# Patient Record
Sex: MALE | Race: WHITE | NOT HISPANIC OR LATINO | Employment: FULL TIME | URBAN - NONMETROPOLITAN AREA
[De-identification: names, ages, dates, MRNs, and addresses within clinical notes are randomized per-mention and may not be internally consistent; named-entity substitution may affect disease eponyms.]

---

## 2020-12-31 ENCOUNTER — IMMUNIZATION (OUTPATIENT)
Dept: VACCINE CLINIC | Facility: HOSPITAL | Age: 65
End: 2020-12-31

## 2020-12-31 PROCEDURE — 91300 HC SARSCOV02 VAC 30MCG/0.3ML IM: CPT | Performed by: FAMILY MEDICINE

## 2020-12-31 PROCEDURE — 0001A: CPT | Performed by: FAMILY MEDICINE

## 2021-01-21 ENCOUNTER — IMMUNIZATION (OUTPATIENT)
Dept: VACCINE CLINIC | Facility: HOSPITAL | Age: 66
End: 2021-01-21

## 2021-01-21 PROCEDURE — 91300 HC SARSCOV02 VAC 30MCG/0.3ML IM: CPT | Performed by: FAMILY MEDICINE

## 2021-01-21 PROCEDURE — 0002A: CPT | Performed by: FAMILY MEDICINE

## 2021-10-27 ENCOUNTER — IMMUNIZATION (OUTPATIENT)
Dept: VACCINE CLINIC | Facility: HOSPITAL | Age: 66
End: 2021-10-27

## 2021-10-27 PROCEDURE — 0004A ADM SARSCOV2 30MCG/0.3ML BOOSTER: CPT | Performed by: INTERNAL MEDICINE

## 2021-10-27 PROCEDURE — 91300 HC SARSCOV02 VAC 30MCG/0.3ML IM: CPT | Performed by: INTERNAL MEDICINE

## 2022-09-26 ENCOUNTER — TRANSCRIBE ORDERS (OUTPATIENT)
Dept: WOUND CARE | Facility: HOSPITAL | Age: 67
End: 2022-09-26

## 2022-09-26 ENCOUNTER — HOSPITAL ENCOUNTER (OUTPATIENT)
Dept: CARDIOLOGY | Facility: HOSPITAL | Age: 67
Discharge: HOME | End: 2022-09-26

## 2022-09-26 ENCOUNTER — TRANSCRIBE ORDERS (OUTPATIENT)
Dept: ADMINISTRATIVE | Facility: HOSPITAL | Age: 67
End: 2022-09-26

## 2022-09-26 ENCOUNTER — HOSPITAL ENCOUNTER (OUTPATIENT)
Dept: GENERAL RADIOLOGY | Facility: HOSPITAL | Age: 67
Discharge: HOME | End: 2022-09-26

## 2022-09-26 DIAGNOSIS — I83.029 VARICOSE VEINS OF LEFT LOWER EXTREMITY WITH ULCER OF UNSPECIFIED SITE (CODE): ICD-10-CM

## 2022-09-26 DIAGNOSIS — I83.029 VARICOSE VEINS OF LEFT LOWER EXTREMITY WITH ULCER OF UNSPECIFIED SITE (CODE): Primary | ICD-10-CM

## 2022-09-26 DIAGNOSIS — L97.929 VARICOSE VEINS WITH ULCER, LEFT: ICD-10-CM

## 2022-09-26 DIAGNOSIS — I87.2 VENOUS INSUFFICIENCY: Primary | ICD-10-CM

## 2022-09-26 DIAGNOSIS — I83.029 VARICOSE VEINS WITH ULCER, LEFT: ICD-10-CM

## 2022-09-26 DIAGNOSIS — I87.2 VENOUS INSUFFICIENCY: ICD-10-CM

## 2022-09-26 PROCEDURE — 93926 LOWER EXTREMITY STUDY: CPT | Performed by: RADIOLOGY

## 2022-09-26 PROCEDURE — 73610 X-RAY EXAM OF ANKLE: CPT

## 2022-09-26 PROCEDURE — 73630 X-RAY EXAM OF FOOT: CPT | Performed by: RADIOLOGY

## 2022-09-26 PROCEDURE — 73610 X-RAY EXAM OF ANKLE: CPT | Performed by: RADIOLOGY

## 2022-09-26 PROCEDURE — 93971 EXTREMITY STUDY: CPT | Performed by: RADIOLOGY

## 2022-09-26 PROCEDURE — 93926 LOWER EXTREMITY STUDY: CPT

## 2022-09-26 PROCEDURE — 73630 X-RAY EXAM OF FOOT: CPT

## 2022-09-26 PROCEDURE — 93971 EXTREMITY STUDY: CPT

## 2022-09-28 ENCOUNTER — HOSPITAL ENCOUNTER (OUTPATIENT)
Dept: WOUND CARE | Facility: HOSPITAL | Age: 67
Discharge: HOME | End: 2022-09-28
Admitting: NURSE PRACTITIONER

## 2022-09-28 VITALS
BODY MASS INDEX: 23.7 KG/M2 | WEIGHT: 175 LBS | HEIGHT: 72 IN | HEART RATE: 69 BPM | DIASTOLIC BLOOD PRESSURE: 71 MMHG | RESPIRATION RATE: 17 BRPM | TEMPERATURE: 98.3 F | SYSTOLIC BLOOD PRESSURE: 169 MMHG

## 2022-09-28 DIAGNOSIS — L97.322 NON-PRESSURE CHRONIC ULCER OF LEFT ANKLE WITH FAT LAYER EXPOSED: Primary | ICD-10-CM

## 2022-09-28 PROBLEM — L97.325: Status: ACTIVE | Noted: 2022-09-28

## 2022-09-28 PROCEDURE — 97602 WOUND(S) CARE NON-SELECTIVE: CPT

## 2022-09-28 PROCEDURE — 97605 NEG PRS WND THER DME<=50SQCM: CPT

## 2022-09-28 RX ORDER — OFLOXACIN 3 MG/ML
SOLUTION AURICULAR (OTIC)
COMMUNITY
Start: 2022-06-24 | End: 2022-10-07

## 2022-09-28 RX ORDER — CIPROFLOXACIN 500 MG/1
TABLET, FILM COATED ORAL EVERY 12 HOURS
COMMUNITY
Start: 2014-01-14 | End: 2022-10-07

## 2022-09-28 RX ORDER — DOXYCYCLINE HYCLATE 100 MG/1
CAPSULE ORAL
COMMUNITY
Start: 2022-09-26 | End: 2022-10-07

## 2022-09-28 RX ORDER — LIDOCAINE HYDROCHLORIDE 20 MG/ML
JELLY TOPICAL AS NEEDED
Status: DISCONTINUED | OUTPATIENT
Start: 2022-09-28 | End: 2022-09-29 | Stop reason: HOSPADM

## 2022-09-28 RX ORDER — AMLODIPINE BESYLATE 10 MG/1
TABLET ORAL
COMMUNITY
Start: 2022-09-26

## 2022-09-28 RX ORDER — DOXAZOSIN 2 MG/1
2 TABLET ORAL DAILY
COMMUNITY
Start: 2022-09-21

## 2022-09-28 RX ADMIN — LIDOCAINE HYDROCHLORIDE: 20 JELLY TOPICAL at 08:51

## 2022-09-28 NOTE — ADDENDUM NOTE
Encounter addended by: Jerald Sierra RN on: 9/28/2022 11:36 AM   Actions taken: Care Plan modified, Flowsheet accepted, Charge Capture section accepted

## 2022-09-28 NOTE — ADDENDUM NOTE
Encounter addended by: Jerald Sierra RN on: 9/28/2022 11:37 AM   Actions taken: Charge Capture section accepted

## 2022-09-28 NOTE — PROGRESS NOTES
Wound Clinic Note  Patient Identification:  Name:  Broderick Pereira  Age:  67 y.o.  Sex:  male  :  1955  MRN:  [id number]   Visit Number:  [id number]  Primary Care Physician:  Allyson Fox APRN     Subjective     Chief complaint:     Left ankle wound    History of presenting illness:     Patient is a 67 y.o. male with past medical history significant for  that presented today for evaluation of left medial ankle. Reports wound has been present for several weeks 3-4 approximately. Unsure of how the area occurred just noticed the area. Denies any trauma to the area that he can recall. He has been applying triple antibiotic to the area. He denies history of diabetes. Is non-smoker. He is currently on Doxycycline started 2022 prescribed by PCP. Denies any prior vascular disease. He has some imaging completed ordered by PCP: Arterial US 2022: Unremarkable exam with no occlusive segments or segments of high-grade. Venous US: No DVT in the left lower extremity on today's exam.  X-ray foot 2022: Soft tissue defect in the posterior subcutaneous tissues at the level of the distal tibia, but no erosion of bone. X-ray ankle 2022: No evidence of osteomyelitis on today's exam.  ---------------------------------------------------------------------------------------------------------------------   Review of Systems   Constitutional: Negative for chills and fever.   HENT: Negative for congestion and rhinorrhea.    Eyes: Negative for pain and redness.   Respiratory: Negative for cough and shortness of breath.    Cardiovascular: Positive for leg swelling. Negative for chest pain.        Intermittent    Gastrointestinal: Negative for nausea and vomiting.   Genitourinary: Negative for difficulty urinating and frequency.   Musculoskeletal: Negative for back pain and gait problem.   Skin: Positive for wound.   Neurological: Negative for dizziness and weakness.   Hematological: Does not bruise/bleed  easily.   Psychiatric/Behavioral: Negative for confusion. The patient is not nervous/anxious.       ---------------------------------------------------------------------------------------------------------------------   Past Medical History:   Diagnosis Date   • Hypertension      Past Surgical History:   Procedure Laterality Date   • GALLBLADDER SURGERY      15 or more years ago   • LEG SURGERY Left 2002    has dori in left leg     Family History   Problem Relation Age of Onset   • Hypertension Sister      Social History     Socioeconomic History   • Marital status:      ---------------------------------------------------------------------------------------------------------------------   Allergies:  Patient has no allergy information on record.  ---------------------------------------------------------------------------------------------------------------------  Objective     ---------------------------------------------------------------------------------------------------------------------   Vital Signs:     No data found.  There were no vitals filed for this visit.  There is no height or weight on file to calculate BMI.  Wt Readings from Last 3 Encounters:   03/25/14 86.2 kg (189 lb 15.9 oz)   01/14/14 81.7 kg (180 lb 0.1 oz)       ---------------------------------------------------------------------------------------------------------------------   Physical Exam  Constitutional: Vital sign were reviewed (temperature, pulse, respiration, and blood pressure) and found to be within expected limits per nursing, general appearance was assessed and the patient was found to be in no distress and calm and comfortable appears  Eyes:lids and lashes normal, pupils equal, round, reactive to light  Ears, Nose, Mouth, Throat:hearing intact and neck normal  Neck: shows normal range of motion without pain, and no evidence of trauma or deformity  and trachea is midline   Respiratory: Normal respiratory effort and  symmetrical chest expansion  Cardiovascular:pulses normal, and intact distal pulses dorsal-pedis  palpable and posteria-tib   palpable2+ lower bilateral  Gastrointestinal:no masses and no tenderness  Musculoskeletal: inspection of digits and nails shows normal findings  and no edema   Neurologic: Mental Status orientated to person, place, time and situation, Speech normal content and execusion  Psychiatric: Normal.  Skin: Temperature:normal turgor and temperatureColor: normal, no cyanosis, jaundice, pallor or bruising, Moisture: dry,Nails: thickened yellow toenails bed, Hair:thinning to lower extremities .  Physical Exam  Wound Assessment:  Location: Left, medial, ankle  Dressing Appearance: dressingappearance: clean  Closure: NA  Changes since last exam: this is initial exam  Etiology and classification: non-pressure chronic ulcer   Wound bed structures/characteristics: full-thickness (subcutaneous tissue is exposed in at least a portion of the wound), fascia is exposed   Edges moist  Periwound characteristics: moist  Periwound Temperature: normal turgor and temperature  Drainage characteristics: moderate, serous   Perfusion characteristics: Pulses palpable, capillary refill adequate    Wound Goal (s):Closure, Epithelization, Free of infection and No further symptoms  Assessment & Plan      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  Time out was called prior to procedure.   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 Fascia was removed by surgical excision  Instrument(s) used: Curette 4mm  Anesthesia:After checking patient allergies,lidocaine topical 2% was administered to provide anesthesia. and 2% Injectable lidocaine was used  Tissue removed: fascia, Percent Removed 80%  Culture or Biopsy: None  Estimated Blood Loss: Moderate  Hemostasis Obtained: pressure and Surgicel     Clinical Impression:Moderate Complexity    Follow-up: Friday for wound vac change. Will re-evaluate frequency of treatment either in clinic or at home with family assistance    MICKEY Brasher   [facility]  09/28/22  08:48 EDT

## 2022-09-29 ENCOUNTER — HOSPITAL ENCOUNTER (OUTPATIENT)
Dept: ULTRASOUND IMAGING | Facility: HOSPITAL | Age: 67
Discharge: HOME | End: 2022-09-29
Admitting: NURSE PRACTITIONER

## 2022-09-29 DIAGNOSIS — L97.322 NON-PRESSURE CHRONIC ULCER OF LEFT ANKLE WITH FAT LAYER EXPOSED: ICD-10-CM

## 2022-09-29 PROCEDURE — 93922 UPR/L XTREMITY ART 2 LEVELS: CPT | Performed by: RADIOLOGY

## 2022-09-29 PROCEDURE — 93922 UPR/L XTREMITY ART 2 LEVELS: CPT

## 2022-09-30 ENCOUNTER — HOSPITAL ENCOUNTER (OUTPATIENT)
Dept: WOUND CARE | Facility: HOSPITAL | Age: 67
Discharge: HOME | End: 2022-09-30

## 2022-09-30 ENCOUNTER — TRANSCRIBE ORDERS (OUTPATIENT)
Dept: ADMINISTRATIVE | Facility: HOSPITAL | Age: 67
End: 2022-09-30

## 2022-09-30 VITALS
HEART RATE: 92 BPM | DIASTOLIC BLOOD PRESSURE: 90 MMHG | SYSTOLIC BLOOD PRESSURE: 164 MMHG | TEMPERATURE: 98.7 F | RESPIRATION RATE: 18 BRPM

## 2022-09-30 DIAGNOSIS — R31.9 HEMATURIA, UNSPECIFIED TYPE: Primary | ICD-10-CM

## 2022-09-30 DIAGNOSIS — L97.322 NON-PRESSURE CHRONIC ULCER OF LEFT ANKLE WITH FAT LAYER EXPOSED: Primary | ICD-10-CM

## 2022-09-30 NOTE — PROGRESS NOTES
Patient here for wound vac dressing change. No new issues or concerns reported. Wife present, plans to change wound vac at home twice a week

## 2022-10-03 ENCOUNTER — APPOINTMENT (OUTPATIENT)
Dept: WOUND CARE | Facility: HOSPITAL | Age: 67
End: 2022-10-03

## 2022-10-04 ENCOUNTER — HOSPITAL ENCOUNTER (OUTPATIENT)
Dept: CT IMAGING | Facility: HOSPITAL | Age: 67
Discharge: HOME | End: 2022-10-04
Admitting: NURSE PRACTITIONER

## 2022-10-04 DIAGNOSIS — R31.9 HEMATURIA, UNSPECIFIED TYPE: ICD-10-CM

## 2022-10-04 LAB — CREAT BLDA-MCNC: 0.8 MG/DL (ref 0.6–1.3)

## 2022-10-04 PROCEDURE — 82565 ASSAY OF CREATININE: CPT

## 2022-10-04 PROCEDURE — 25010000002 IOPAMIDOL 61 % SOLUTION: Performed by: NURSE PRACTITIONER

## 2022-10-04 PROCEDURE — 74177 CT ABD & PELVIS W/CONTRAST: CPT | Performed by: RADIOLOGY

## 2022-10-04 PROCEDURE — 74177 CT ABD & PELVIS W/CONTRAST: CPT

## 2022-10-04 RX ADMIN — IOPAMIDOL 100 ML: 612 INJECTION, SOLUTION INTRAVENOUS at 14:24

## 2022-10-05 ENCOUNTER — HOSPITAL ENCOUNTER (OUTPATIENT)
Dept: WOUND CARE | Facility: HOSPITAL | Age: 67
Discharge: HOME | End: 2022-10-05
Admitting: NURSE PRACTITIONER

## 2022-10-05 VITALS
RESPIRATION RATE: 18 BRPM | TEMPERATURE: 98.3 F | HEART RATE: 94 BPM | DIASTOLIC BLOOD PRESSURE: 93 MMHG | SYSTOLIC BLOOD PRESSURE: 129 MMHG

## 2022-10-05 DIAGNOSIS — L97.322 NON-PRESSURE CHRONIC ULCER OF LEFT ANKLE WITH FAT LAYER EXPOSED: Primary | ICD-10-CM

## 2022-10-05 LAB
ALBUMIN SERPL-MCNC: 3.9 G/DL (ref 3.5–5.2)
ALBUMIN/GLOB SERPL: 1.6 G/DL
ALP SERPL-CCNC: 59 U/L (ref 39–117)
ALT SERPL W P-5'-P-CCNC: 14 U/L (ref 1–41)
ANION GAP SERPL CALCULATED.3IONS-SCNC: 12.2 MMOL/L (ref 5–15)
AST SERPL-CCNC: 17 U/L (ref 1–40)
BASOPHILS # BLD AUTO: 0.05 10*3/MM3 (ref 0–0.2)
BASOPHILS NFR BLD AUTO: 0.4 % (ref 0–1.5)
BILIRUB SERPL-MCNC: 0.6 MG/DL (ref 0–1.2)
BUN SERPL-MCNC: 26 MG/DL (ref 8–23)
BUN/CREAT SERPL: 28.9 (ref 7–25)
CALCIUM SPEC-SCNC: 9.4 MG/DL (ref 8.6–10.5)
CHLORIDE SERPL-SCNC: 106 MMOL/L (ref 98–107)
CO2 SERPL-SCNC: 22.8 MMOL/L (ref 22–29)
CREAT SERPL-MCNC: 0.9 MG/DL (ref 0.76–1.27)
CRP SERPL-MCNC: 2.74 MG/DL (ref 0–0.5)
DEPRECATED RDW RBC AUTO: 44 FL (ref 37–54)
EGFRCR SERPLBLD CKD-EPI 2021: 94.8 ML/MIN/1.73
EOSINOPHIL # BLD AUTO: 0.28 10*3/MM3 (ref 0–0.4)
EOSINOPHIL NFR BLD AUTO: 2.4 % (ref 0.3–6.2)
ERYTHROCYTE [DISTWIDTH] IN BLOOD BY AUTOMATED COUNT: 12.9 % (ref 12.3–15.4)
ERYTHROCYTE [SEDIMENTATION RATE] IN BLOOD: 18 MM/HR (ref 0–20)
GLOBULIN UR ELPH-MCNC: 2.5 GM/DL
GLUCOSE SERPL-MCNC: 91 MG/DL (ref 65–99)
HCT VFR BLD AUTO: 41.5 % (ref 37.5–51)
HGB BLD-MCNC: 13.5 G/DL (ref 13–17.7)
IMM GRANULOCYTES # BLD AUTO: 0.04 10*3/MM3 (ref 0–0.05)
IMM GRANULOCYTES NFR BLD AUTO: 0.3 % (ref 0–0.5)
LYMPHOCYTES # BLD AUTO: 1.31 10*3/MM3 (ref 0.7–3.1)
LYMPHOCYTES NFR BLD AUTO: 11.3 % (ref 19.6–45.3)
MCH RBC QN AUTO: 30.3 PG (ref 26.6–33)
MCHC RBC AUTO-ENTMCNC: 32.5 G/DL (ref 31.5–35.7)
MCV RBC AUTO: 93 FL (ref 79–97)
MONOCYTES # BLD AUTO: 0.9 10*3/MM3 (ref 0.1–0.9)
MONOCYTES NFR BLD AUTO: 7.8 % (ref 5–12)
NEUTROPHILS NFR BLD AUTO: 77.8 % (ref 42.7–76)
NEUTROPHILS NFR BLD AUTO: 9 10*3/MM3 (ref 1.7–7)
NRBC BLD AUTO-RTO: 0 /100 WBC (ref 0–0.2)
PLATELET # BLD AUTO: 199 10*3/MM3 (ref 140–450)
PMV BLD AUTO: 10.1 FL (ref 6–12)
POTASSIUM SERPL-SCNC: 3.7 MMOL/L (ref 3.5–5.2)
PREALB SERPL-MCNC: 22.2 MG/DL (ref 20–40)
PROT SERPL-MCNC: 6.4 G/DL (ref 6–8.5)
RBC # BLD AUTO: 4.46 10*6/MM3 (ref 4.14–5.8)
SODIUM SERPL-SCNC: 141 MMOL/L (ref 136–145)
WBC NRBC COR # BLD: 11.58 10*3/MM3 (ref 3.4–10.8)

## 2022-10-05 PROCEDURE — 87070 CULTURE OTHR SPECIMN AEROBIC: CPT | Performed by: NURSE PRACTITIONER

## 2022-10-05 PROCEDURE — 85652 RBC SED RATE AUTOMATED: CPT | Performed by: NURSE PRACTITIONER

## 2022-10-05 PROCEDURE — 85025 COMPLETE CBC W/AUTO DIFF WBC: CPT | Performed by: NURSE PRACTITIONER

## 2022-10-05 PROCEDURE — 80053 COMPREHEN METABOLIC PANEL: CPT | Performed by: NURSE PRACTITIONER

## 2022-10-05 PROCEDURE — 84134 ASSAY OF PREALBUMIN: CPT | Performed by: NURSE PRACTITIONER

## 2022-10-05 PROCEDURE — 86140 C-REACTIVE PROTEIN: CPT | Performed by: NURSE PRACTITIONER

## 2022-10-05 PROCEDURE — 87205 SMEAR GRAM STAIN: CPT | Performed by: NURSE PRACTITIONER

## 2022-10-05 PROCEDURE — 97602 WOUND(S) CARE NON-SELECTIVE: CPT

## 2022-10-05 RX ORDER — LIDOCAINE HYDROCHLORIDE 20 MG/ML
JELLY TOPICAL AS NEEDED
Status: DISCONTINUED | OUTPATIENT
Start: 2022-10-05 | End: 2022-10-06 | Stop reason: HOSPADM

## 2022-10-05 RX ADMIN — LIDOCAINE HYDROCHLORIDE: 20 JELLY TOPICAL at 09:16

## 2022-10-05 NOTE — PROGRESS NOTES
Wound Clinic Note  Patient Identification:  Name:  Broderick Pereira  Age:  67 y.o.  Sex:  male  :  1955  MRN:  [id number]   Visit Number:  [id number]  Primary Care Physician:  Allyson Fox APRN     Subjective     Chief complaint:     Left ankle wound    History of presenting illness:     Patient is a 67 y.o. male with past medical history significant for  that presented today for evaluation of left medial ankle. Reports wound has been present for several weeks 3-4 approximately. Unsure of how the area occurred just noticed the area. Denies any trauma to the area that he can recall. He has been applying triple antibiotic to the area. He denies history of diabetes. Is non-smoker. He is currently on Doxycycline started 2022 prescribed by PCP. Denies any prior vascular disease. He has some imaging completed ordered by PCP: Arterial US 2022: Unremarkable exam with no occlusive segments or segments of high-grade. Venous US: No DVT in the left lower extremity on today's exam.  X-ray foot 2022: Soft tissue defect in the posterior subcutaneous tissues at the level of the distal tibia, but no erosion of bone. X-ray ankle 2022: No evidence of osteomyelitis on today's exam.    Interval History:   10/05/2022: Patient seen in clinic today for follow-up to left medial ankle wound. He has had wound vac in place and wife and been performing changes at home. He has increase in swelling and erythema. He states he has been on his feet with work the past several days. Denies any fever or chills. Reports some increase in pain after being on his feet for several hours at work.   ---------------------------------------------------------------------------------------------------------------------   Review of Systems   Constitutional: Negative for chills and fever.   HENT: Negative for congestion and rhinorrhea.    Respiratory: Negative for cough and shortness of breath.    Cardiovascular: Positive for  leg swelling. Negative for chest pain.        Intermittent    Gastrointestinal: Negative for nausea and vomiting.   Musculoskeletal: Negative for back pain and gait problem.   Skin: Positive for wound.   Hematological: Does not bruise/bleed easily.      ---------------------------------------------------------------------------------------------------------------------   Past Medical History:   Diagnosis Date   • Hypertension      Past Surgical History:   Procedure Laterality Date   • GALLBLADDER SURGERY      15 or more years ago   • LEG SURGERY Left 2002    has dori in left leg     Family History   Problem Relation Age of Onset   • Hypertension Sister      Social History     Socioeconomic History   • Marital status:    Tobacco Use   • Smoking status: Never Smoker   • Smokeless tobacco: Never Used   Substance and Sexual Activity   • Alcohol use: Never   • Drug use: Never   • Sexual activity: Defer     Partners: Female     ---------------------------------------------------------------------------------------------------------------------   Allergies:  Sulfa antibiotics  ---------------------------------------------------------------------------------------------------------------------  Objective     ---------------------------------------------------------------------------------------------------------------------   Vital Signs:     No data found.  There were no vitals filed for this visit.  There is no height or weight on file to calculate BMI.  Wt Readings from Last 3 Encounters:   09/28/22 79.4 kg (175 lb)   03/25/14 86.2 kg (189 lb 15.9 oz)   01/14/14 81.7 kg (180 lb 0.1 oz)       ---------------------------------------------------------------------------------------------------------------------   Physical Exam  Constitutional: Vital sign were reviewed (temperature, pulse, respiration, and blood pressure) and found to be within expected limits per nursing, general appearance was assessed and the patient  was found to be in no distress and calm and comfortable appears  Eyes:lids and lashes normal, pupils equal, round, reactive to light  Ears, Nose, Mouth, Throat:hearing intact and neck normal  Neck: shows normal range of motion without pain, and no evidence of trauma or deformity  and trachea is midline   Respiratory: Normal respiratory effort and symmetrical chest expansion  Cardiovascular:pulses normal, and intact distal pulses dorsal-pedis  palpable and posteria-tib   palpable2+ lower bilateral  Gastrointestinal:no masses and no tenderness  Musculoskeletal: inspection of digits and nails shows normal findings  and no edema   Neurologic: Mental Status orientated to person, place, time and situation, Speech normal content and execusion  Psychiatric: Normal.  Skin: Temperature:normal turgor and temperatureColor: normal, no cyanosis, jaundice, pallor or bruising, Moisture: dry,Nails: thickened yellow toenails bed, Hair:thinning to lower extremities .  Physical Exam  Wound Assessment:  Location: Left, medial, ankle  Dressing Appearance: dressingappearance: clean  Closure: NA  Changes since last exam: no changes  Etiology and classification: non-pressure chronic ulcer   Wound bed structures/characteristics: full-thickness (subcutaneous tissue is exposed in at least a portion of the wound), fascia is exposed   Edges moist  Periwound characteristics: moist  Periwound Temperature: normal turgor and temperature  Drainage characteristics: moderate, serous   Perfusion characteristics: Pulses palpable, capillary refill adequate    Wound Goal (s):Closure, Epithelization, Free of infection and No further symptoms  Assessment & Plan      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   - wound vac on hold at this time   -honeygel to base and secure with kerlix and ACE to assist with swelling.  -Recommend ailyn  protein diet 120g/day along with vitamin C 2000mg/day, vitamin A 5000 Units/day, vitamin D3 5000 Units/day, zinc 50mg/day to help promote wound healing   -Labs ordered: CBC, CMP, CRP, sed rate, prealbumin, and hemoglobain A1C to assess inflammatory markers and nutritional status as this both and negatively impact wound healing if not properly treated.   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  Time out was called prior to procedure.   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 Fascia was removed by surgical excision  Instrument(s) used: Curette 4mm  Anesthesia:After checking patient allergies,lidocaine topical 2% was administered to provide  anesthesia. and 2% Injectable lidocaine was used  Tissue removed: fascia, Percent Removed 80%  Culture or Biopsy: culture and sensitivity  Estimated Blood Loss: Moderate  Hemostasis Obtained: pressure and Surgicel     Clinical Impression:Moderate Complexity    Follow-up: 1 week    MICKEY Brasher   [facility]  10/05/22  09:24 EDT

## 2022-10-07 ENCOUNTER — APPOINTMENT (OUTPATIENT)
Dept: WOUND CARE | Facility: HOSPITAL | Age: 67
End: 2022-10-07

## 2022-10-07 ENCOUNTER — PRE-ADMISSION TESTING (OUTPATIENT)
Dept: PREADMISSION TESTING | Facility: HOSPITAL | Age: 67
End: 2022-10-07

## 2022-10-07 ENCOUNTER — OFFICE VISIT (OUTPATIENT)
Dept: UROLOGY | Facility: CLINIC | Age: 67
End: 2022-10-07

## 2022-10-07 VITALS — BODY MASS INDEX: 23.7 KG/M2 | HEIGHT: 72 IN | WEIGHT: 175 LBS

## 2022-10-07 DIAGNOSIS — N21.0 BLADDER STONE: ICD-10-CM

## 2022-10-07 DIAGNOSIS — R35.0 FREQUENCY OF URINATION: ICD-10-CM

## 2022-10-07 DIAGNOSIS — N20.1 LEFT URETERAL STONE: Primary | ICD-10-CM

## 2022-10-07 DIAGNOSIS — N20.0 BILATERAL NEPHROLITHIASIS: ICD-10-CM

## 2022-10-07 DIAGNOSIS — R10.9 BILATERAL FLANK PAIN: ICD-10-CM

## 2022-10-07 DIAGNOSIS — R31.0 GROSS HEMATURIA: ICD-10-CM

## 2022-10-07 LAB
BILIRUB BLD-MCNC: NEGATIVE MG/DL
CLARITY, POC: ABNORMAL
COLOR UR: YELLOW
EXPIRATION DATE: ABNORMAL
GLUCOSE UR STRIP-MCNC: NEGATIVE MG/DL
KETONES UR QL: NEGATIVE
LEUKOCYTE EST, POC: ABNORMAL
Lab: ABNORMAL
NITRITE UR-MCNC: NEGATIVE MG/ML
PH UR: 7 [PH] (ref 5–8)
PROT UR STRIP-MCNC: ABNORMAL MG/DL
RBC # UR STRIP: ABNORMAL /UL
SP GR UR: 1.01 (ref 1–1.03)
UROBILINOGEN UR QL: NORMAL

## 2022-10-07 PROCEDURE — 93010 ELECTROCARDIOGRAM REPORT: CPT | Performed by: INTERNAL MEDICINE

## 2022-10-07 PROCEDURE — 99204 OFFICE O/P NEW MOD 45 MIN: CPT | Performed by: NURSE PRACTITIONER

## 2022-10-07 PROCEDURE — 81003 URINALYSIS AUTO W/O SCOPE: CPT | Performed by: NURSE PRACTITIONER

## 2022-10-07 PROCEDURE — 93005 ELECTROCARDIOGRAM TRACING: CPT

## 2022-10-07 PROCEDURE — 87086 URINE CULTURE/COLONY COUNT: CPT | Performed by: NURSE PRACTITIONER

## 2022-10-07 RX ORDER — MULTIPLE VITAMINS W/ MINERALS TAB 9MG-400MCG
1 TAB ORAL DAILY
COMMUNITY

## 2022-10-07 RX ORDER — GENTAMICIN SULFATE 80 MG/100ML
80 INJECTION, SOLUTION INTRAVENOUS ONCE
Status: CANCELLED | OUTPATIENT
Start: 2022-10-10 | End: 2022-10-07

## 2022-10-07 NOTE — H&P (VIEW-ONLY)
"Chief Complaint  GROSS HEMATURIA /BILATERAL NEPHROLITHIASIS/BLADDER STONE/FL (NEW PATIENT WITH LEFT UPJ STONE/BLADDER STONE/BILATERAL RENAL STONES)    Subjective          Broderick Pereira presents to [facility] for RENAL STONES/HEMATURIA/BLADDER STONE  History of Present Illness    Mr. Broderick Pereira is a pleasant 67-year-old male with a significant history of recurrent kidney stones,  who presents to clinic today for evaluation accompanied by his wife, Brittany-NP. He is a referral from his PCP- MICKEY Chapman, as a new patient consult for bilateral renal calculi, bladder calculus, and gross hematuria.    Patient reports he saw his PCP secondary to bouts of gross hematuria that had become very bothersome to him. The patient apparently has a CT scan on file that was performed on 10/04/2022 when he saw his PCP.      KIDNEYS AND URETERS:  Severe left hydronephrosis and hydroureter secondary to a left distal ureteral calculus measuring 7 mm.  Other nonobstructive bilateral renal calculi.    BLADDER:  Right posterior urinary bladder 1.3 cm calculus.  IMPRESSION:  1.  Severe left hydronephrosis and hydroureter secondary to a left distal ureteral calculus measuring 7 mm.  2.  Right posterior urinary bladder 1.3 cm calculus.     On evaluation in clinic today he is in no apparent discomfort and denies any flank pain  Or discomfort. He denies dysuria or burning with urination. He has had frequency, hesitancy and nocturia 4-5x.He is post ABX therapy Doxycycline per his PCP.  His urine disp stick today show 1+leukocyte esterase, negative nitrite, 1 + protein, 3+microscopic hematuria.      His wife Brittany states that the patient was seen by MICKEY Webster, for a \"hole\" in his ankle. Labs and urinalysis were performed at that time, which revealed 2+ blood. He has a history of renal calculi and hypertension. He then had a CT scan performed. The patient denies having any pain. " "He drinks significant amounts of pop.    Objective   Vital Signs:   Ht 182.9 cm (72\")   Wt 79.4 kg (175 lb)   BMI 23.73 kg/m²       ROS:   Review of Systems   Constitutional: Positive for activity change and fatigue. Negative for appetite change, chills, diaphoresis, fever, unexpected weight gain and unexpected weight loss.   HENT: Negative for congestion, ear discharge, ear pain, nosebleeds, rhinorrhea, sinus pressure and sore throat.    Eyes: Negative for blurred vision, double vision, photophobia, pain, redness and visual disturbance.   Respiratory: Negative for apnea, cough, chest tightness, shortness of breath, wheezing and stridor.    Cardiovascular: Negative for chest pain and palpitations.   Gastrointestinal: Positive for abdominal distention. Negative for abdominal pain, constipation, diarrhea, nausea and vomiting.   Endocrine: Negative for polydipsia, polyphagia and polyuria.   Genitourinary: Positive for decreased urine volume, frequency, hematuria, nocturia and urgency. Negative for difficulty urinating, discharge, dysuria, flank pain, genital sores, penile pain, penile swelling, scrotal swelling, testicular pain and urinary incontinence.   Musculoskeletal: Negative for arthralgias, back pain and joint swelling.   Skin: Negative for pallor, rash and wound.   Neurological: Positive for weakness. Negative for dizziness, tremors, syncope, light-headedness, memory problem and confusion.   Hematological: Bruises/bleeds easily.   Psychiatric/Behavioral: Positive for sleep disturbance and stress. Negative for agitation, behavioral problems and decreased concentration.        Physical Exam  Constitutional:       General: He is in acute distress.      Appearance: He is well-developed. He is obese. He is ill-appearing.   HENT:      Head: Normocephalic and atraumatic.      Right Ear: External ear normal.      Left Ear: External ear normal.   Eyes:      General:         Right eye: No discharge.         Left eye: " No discharge.      Conjunctiva/sclera: Conjunctivae normal.      Pupils: Pupils are equal, round, and reactive to light.   Neck:      Thyroid: No thyromegaly.      Trachea: No tracheal deviation.   Cardiovascular:      Rate and Rhythm: Normal rate and regular rhythm.      Heart sounds: No murmur heard.    No friction rub.   Pulmonary:      Effort: Pulmonary effort is normal. No respiratory distress.      Breath sounds: Normal breath sounds. No stridor.   Abdominal:      General: Bowel sounds are normal. There is distension.      Palpations: Abdomen is soft.      Tenderness: There is abdominal tenderness. There is no guarding.   Genitourinary:     Penis: Normal and uncircumcised. No tenderness or discharge.       Testes: Normal.      Rectum: Normal. Guaiac result negative.      Comments: URINE FREQUENCY, URGENCY, NOCTURIA, HESITANCY  Musculoskeletal:         General: No tenderness or deformity. Normal range of motion.      Cervical back: Normal range of motion and neck supple.   Skin:     General: Skin is warm and dry.      Capillary Refill: Capillary refill takes less than 2 seconds.      Coloration: Skin is pale.   Neurological:      Mental Status: He is alert and oriented to person, place, and time.      Cranial Nerves: No cranial nerve deficit.      Motor: Weakness present.      Coordination: Coordination normal.   Psychiatric:         Behavior: Behavior normal.         Thought Content: Thought content normal.         Judgment: Judgment normal.        Result Review :     UA    Urinalysis 10/7/22   Ketones, UA Negative   Leukocytes, UA Small (1+) (A)   (A) Abnormal value            Urine Culture    Urine Culture 10/7/22   Urine Culture No growth                  There is also a right posterior urinary bladder calculus measuring 1.3 cm.    He has multiple renal calculi in the right kidney measuring at least 4 to 5 mm. The left kidney revealed 4 calculi and the biggest was 1.9 mm, almost 2 cm calculus. Left distal  ureteral calculus measures about 9 mm, which is causing blockage and hydronephrosis in the left kidney. He has a 1.2 cm bladder calculus on the right.    Labs from 10/05/2022 reveal creatinine 0.9. C-reactive protein is elevated at 2.74.    Urinalysis revealed 2+ blood.    Data reviewed: Radiologic studies CT DONE 10/04-Severe left hydronephrosis and hydroureter secondary to a left distal ureteral calculus measuring 7 mm.  Other nonobstructive bilateral renal calculi. WITH A 1.3CM RIGHT BLADDER STONE     Assessment and Plan    Problem List Items Addressed This Visit        Gastrointestinal Abdominal     Bilateral flank pain    Overview     Added automatically from request for surgery [id number]         Relevant Orders    Case Request (Completed)       Genitourinary and Reproductive     Left ureteral stone - Primary    Overview     Added automatically from request for surgery [id number]         Current Assessment & Plan     At this time kidney function is holding up. Reviewed CT scan with the patient and his wife. On Wednesday, 10/12/2022, he will undergo cystolitholapaxy for bladder stone, left ureteroscopy laser lithotripsy, and possible left stent placement with Dr. Gutierrez.          Relevant Orders    Case Request (Completed)    Bladder stone    Overview     Added automatically from request for surgery [id number]         Current Assessment & Plan     On Wednesday, 10/12/2022, he will undergo cystolitholapaxy for bladder stone, left ureteroscopy laser lithotripsy, and possible left stent placement with Dr. Gutierrez.            Relevant Orders    Case Request (Completed)    Bilateral nephrolithiasis    Overview     Added automatically from request for surgery [id number]         Current Assessment & Plan     On Wednesday, 10/12/2022, he will undergo cystolitholapaxy for bladder stone, left ureteroscopy laser lithotripsy, and possible left stent placement with Dr. Gutierrez.          Relevant Orders    Case Request (Completed)     Gross hematuria    Overview     Added automatically from request for surgery [id number]         Relevant Orders    Case Request (Completed)   Other Visit Diagnoses     Frequency of urination        Urine will be sent for culture.    Relevant Orders    POC Urinalysis Dipstick, Automated (Completed)    Urine Culture - Urine, Urine, Clean Catch (Completed)        BILATERAL NEPHROLITHIASIS /BLADDER STONE /Flank Pain / Left UVJ STONE The Patient has been diagnosed with a 7MM  OBSTRUCTING left ureteral calculus/BLADDER STONE and referred to us. He has NO discomfort today, BUT describes his prior  pain as colicky, but tolerable. We have discussed the various parameters regarding spontaneous passage including the notion that a tiny 1-4 mm stone has a 95% high likelihood of spontaneous passage versus a larger stone of greater than 7 mm like he has  being caught up in the upper areas of the urinary tract.      We also discussed the medical management of stone disease and the use of medical expulsive therapy in the form of Flomax. This is used in an off label setting. I also talked about nonoperative management including ambulation and increasing fluids to atleast 2-3L,  and hot tubs as being an effective adjuncts in the treatment of a ureteral stone.  We discussed the absolute relative indicators for intervention including the presence of sepsis, and pain we cannot control as the primary need for urgent intervention.  We discussed placement of a stent if indicated and the management of the stent as well.     PLAN  I Discussed this case with Dr. Gutierrez, who also reviewed  imaging and is agreeable plan of care.      Patient has been scheduled for Cystolitholapaxy procedure  With Left Ureteroscopy Laser Lithotripsy, with possible left stent on Monday, 10/10/2022    Patient has adequate NON Narcotic pain medication just in case and Nausea medication and Flomax for medical expulsive therapy.      We discussed treatment options for  flank pain with patient encouraged to continue conservative therapy alternating NSAID/Tylenol as tolerated, Also including hot baths, showers, warm compresses to lower back as tolerated. Also encouraged walking, physical therapy, light exercises as tolerated    Rest is the most important treatment in the case of flank pain. Rest and physical therapy are enough to improve minor pain. Discussed to monitor her daily routine with prevention of flank pain by: At least Drinking 8 glasses of water per day, Limiting your alcohol consumption.  Having a healthy diet containing fruits, vegetables, and a lot of fluids, Practicing safe sex.  Also maintaining proper hygiene of your body as well as the environment.    Will follow up with patient post procedure.    He may return sooner if need be or go to Er if any worsening symptoms    Patient/wife  is agreeable plan of care.    Patient reports that he is not currently experiencing any symptoms of urinary incontinence.    BMI is within normal parameters. No other follow-up for BMI required.    RADIOLOGY (CT AND/OR KUB):    CT Abdomen and Pelvis: Results for orders placed in visit on 01/21/14    CT ABDOMEN PELVIS WITH AND WITHOUT CONTRAST    Narrative  EXAM:  CT ABDOMEN WITHOUT CONTRAST AND CT PELVIS WITHOUT CONTRAST  CT ABDOMEN WITH CONTRAST AND CT PELVIS WITH CONTRAST    REASON: 58-year-old with hematuria    PROCEDURE:    Axial images were acquired from the lung bases through the  pubic symphysis initially without any IV contrast. Images were then  acquired from the lung bases through the pubic symphysis during IV  contrast and then on a delayed basis.    FINDINGS: The unenhanced study shows nonobstructing stones within both  kidneys.  There is, however, obstructive uropathy on the left. There is  hydronephrosis and hydroureter. Several stones are present in the left  ureter. The largest stone is just at the left UVJ. It measures 7.4 mm.  More proximal stone is 6.3 mm.    I do  not see any right-sided ureteral stones.    The bladder wall is slightly thickened and the prostate gland is  enlarged.    The contrasted study shows no solid renal mass.    The liver and spleen are homogeneous with the exception of 2 low  attenuation lesions in the liver, probably hepatic cysts.    No free fluid or walled-off fluid collections are seen.    IMPRESSION-    Obstructive uropathy on the left due to several distal  left ureteral stones. The largest is at the left UVJ.    - ARELI Meza Radiologist- HILARIO CLEMENTS  Releasing RadiologistFaheem CLEMENTS  Released Date Time- 01/21/14 1126  ------------------------------------------------------------------------------     CT Stone Protocol: No results found for this or any previous visit.     KUB: Results for orders placed in visit on 01/28/14    X-RAY ABDOMEN KUB    Narrative  EXAM: SUPINE VIEW OF THE ABDOMEN    REASON FOR EXAM: To evaluate stones    Today's study is compared with the previous CT scan from the 21st. There  are multiple calcifications seen projected over the collecting systems  of both kidneys more prominent on the left than right. There are stones  in the upper, mid and lower pole collecting system. There were some  stones in the intrarenal collecting system of the left kidney as well.  On the KUB no stones are identifiable along the course of the ureters.  The bowel gas pattern in the abdomen was unremarkable.    - ARELI Meza Radiologist- CROW BRIONES  Releasing Rad BRIONES  Released Date Time- 01/29/14 1059  ------------------------------------------------------------------------------       LABS (3 MONTHS):    Pre-Admission Testing on 10/07/2022   Component Date Value Ref Range Status   • QT Interval 10/07/2022 406  ms Preliminary   • QTC Interval 10/07/2022 447  ms Preliminary   Office Visit on 10/07/2022   Component Date Value Ref Range Status   • Color  10/07/2022 Yellow  Yellow, Straw, Dark Yellow, Ramya Final   • Clarity, UA 10/07/2022 Cloudy (A)  Clear Final   • Specific Gravity  10/07/2022 1.015  1.005 - 1.030 Final   • pH, Urine 10/07/2022 7.0  5.0 - 8.0 Final   • Leukocytes 10/07/2022 Small (1+) (A)  Negative Final   • Nitrite, UA 10/07/2022 Negative  Negative Final   • Protein, POC 10/07/2022 1+ (A)  Negative mg/dL Final   • Glucose, UA 10/07/2022 Negative  Negative mg/dL Final   • Ketones, UA 10/07/2022 Negative  Negative Final   • Urobilinogen, UA 10/07/2022 Normal  Normal, 0.2 E.U./dL Final   • Bilirubin 10/07/2022 Negative  Negative Final   • Blood, UA 10/07/2022 3+ (A)  Negative Final   • Lot Number 10/07/2022 [id number]   Final   • Expiration Date 10/07/2022 [id number]   Final   • Urine Culture 10/07/2022 No growth   Final   Hospital Outpatient Visit on 10/05/2022   Component Date Value Ref Range Status   • Glucose 10/05/2022 91  65 - 99 mg/dL Final   • BUN 10/05/2022 26 (H)  8 - 23 mg/dL Final   • Creatinine 10/05/2022 0.90  0.76 - 1.27 mg/dL Final   • Sodium 10/05/2022 141  136 - 145 mmol/L Final   • Potassium 10/05/2022 3.7  3.5 - 5.2 mmol/L Final   • Chloride 10/05/2022 106  98 - 107 mmol/L Final   • CO2 10/05/2022 22.8  22.0 - 29.0 mmol/L Final   • Calcium 10/05/2022 9.4  8.6 - 10.5 mg/dL Final   • Total Protein 10/05/2022 6.4  6.0 - 8.5 g/dL Final   • Albumin 10/05/2022 3.90  3.50 - 5.20 g/dL Final   • ALT (SGPT) 10/05/2022 14  1 - 41 U/L Final   • AST (SGOT) 10/05/2022 17  1 - 40 U/L Final   • Alkaline Phosphatase 10/05/2022 59  39 - 117 U/L Final   • Total Bilirubin 10/05/2022 0.6  0.0 - 1.2 mg/dL Final   • Globulin 10/05/2022 2.5  gm/dL Final   • A/G Ratio 10/05/2022 1.6  g/dL Final   • BUN/Creatinine Ratio 10/05/2022 28.9 (H)  7.0 - 25.0 Final   • Anion Gap 10/05/2022 12.2  5.0 - 15.0 mmol/L Final   • eGFR 10/05/2022 94.8  >60.0 mL/min/1.73 Final    National Kidney Foundation and American Society of Nephrology (ASN) Task Force recommended  calculation based on the Chronic Kidney Disease Epidemiology Collaboration (CKD-EPI) equation refit without adjustment for race.   • C-Reactive Protein 10/05/2022 2.74 (H)  0.00 - 0.50 mg/dL Final   • Sed Rate 10/05/2022 18  0 - 20 mm/hr Final   • Prealbumin 10/05/2022 22.2  20.0 - 40.0 mg/dL Final   • WBC 10/05/2022 11.58 (H)  3.40 - 10.80 10*3/mm3 Final   • RBC 10/05/2022 4.46  4.14 - 5.80 10*6/mm3 Final   • Hemoglobin 10/05/2022 13.5  13.0 - 17.7 g/dL Final   • Hematocrit 10/05/2022 41.5  37.5 - 51.0 % Final   • MCV 10/05/2022 93.0  79.0 - 97.0 fL Final   • MCH 10/05/2022 30.3  26.6 - 33.0 pg Final   • MCHC 10/05/2022 32.5  31.5 - 35.7 g/dL Final   • RDW 10/05/2022 12.9  12.3 - 15.4 % Final   • RDW-SD 10/05/2022 44.0  37.0 - 54.0 fl Final   • MPV 10/05/2022 10.1  6.0 - 12.0 fL Final   • Platelets 10/05/2022 199  140 - 450 10*3/mm3 Final   • Neutrophil % 10/05/2022 77.8 (H)  42.7 - 76.0 % Final   • Lymphocyte % 10/05/2022 11.3 (L)  19.6 - 45.3 % Final   • Monocyte % 10/05/2022 7.8  5.0 - 12.0 % Final   • Eosinophil % 10/05/2022 2.4  0.3 - 6.2 % Final   • Basophil % 10/05/2022 0.4  0.0 - 1.5 % Final   • Immature Grans % 10/05/2022 0.3  0.0 - 0.5 % Final   • Neutrophils, Absolute 10/05/2022 9.00 (H)  1.70 - 7.00 10*3/mm3 Final   • Lymphocytes, Absolute 10/05/2022 1.31  0.70 - 3.10 10*3/mm3 Final   • Monocytes, Absolute 10/05/2022 0.90  0.10 - 0.90 10*3/mm3 Final   • Eosinophils, Absolute 10/05/2022 0.28  0.00 - 0.40 10*3/mm3 Final   • Basophils, Absolute 10/05/2022 0.05  0.00 - 0.20 10*3/mm3 Final   • Immature Grans, Absolute 10/05/2022 0.04  0.00 - 0.05 10*3/mm3 Final   • nRBC 10/05/2022 0.0  0.0 - 0.2 /100 WBC Final   • Wound Culture 10/05/2022 Light growth (2+) Normal Skin Mariah   Final   • Gram Stain 10/05/2022 Rare (1+) WBCs seen   Final   • Gram Stain 10/05/2022 Rare (1+) Gram positive cocci in pairs   Final   Hospital Outpatient Visit on 10/04/2022   Component Date Value Ref Range Status   • Creatinine  10/04/2022 0.80  0.60 - 1.30 mg/dL Final    Serial Number: [id number]  [id number]        Smoking Cessation Counseling:  Never a smoker.  Patient does not currently use any tobacco products.     Follow Up   Return in about 3 days (around 10/10/2022) for Next scheduled follow up, With Dr. Gutierrez FOR RIGHT CYSTOLITHOLAPXY/LEFT URETERESCOPY WITH STENT.    Patient was given instructions and counseling regarding his condition or for health maintenance advice. Please see specific information pulled into the AVS if appropriate.          This document has been electronically signed by Griselda Cheng-Akwa, APRN   October 8, 2022 23:55 EDT      Dictated Utilizing Dragon Dictation: Part of this note may be an electronic transcription/translation of spoken language to printed text using the Dragon Dictation System.       Transcribed from ambient dictation for Griselda Cheng-Akwa, APRN by Lis Webster.  10/07/22   08:45 EDT    Patient or patient representative verbalized consent to the visit recording.  I have personally performed the services described in this document as transcribed by the above individual, and it is both accurate and complete.  Griselda Cheng-Akwa, APRN  10/9/2022  00:00 EDT     Transcribed from ambient dictation for Griselda Cheng-Akwa, APRN by Lis Webster.  10/07/22   09:33 EDT

## 2022-10-07 NOTE — ASSESSMENT & PLAN NOTE
On Wednesday, 10/12/2022, he will undergo cystolitholapaxy for bladder stone, left ureteroscopy laser lithotripsy, and possible left stent placement with Dr. Gutierrez.

## 2022-10-07 NOTE — ASSESSMENT & PLAN NOTE
At this time kidney function is holding up. Reviewed CT scan with the patient and his wife. On Wednesday, 10/12/2022, he will undergo cystolitholapaxy for bladder stone, left ureteroscopy laser lithotripsy, and possible left stent placement with Dr. Gutierrez.

## 2022-10-07 NOTE — PROGRESS NOTES
"Chief Complaint  GROSS HEMATURIA /BILATERAL NEPHROLITHIASIS/BLADDER STONE/FL (NEW PATIENT WITH LEFT UPJ STONE/BLADDER STONE/BILATERAL RENAL STONES)    Subjective          Broderick Pereira presents to [facility] for RENAL STONES/HEMATURIA/BLADDER STONE  History of Present Illness    Mr. Broderick Pereira is a pleasant 67-year-old male with a significant history of recurrent kidney stones,  who presents to clinic today for evaluation accompanied by his wife, Brittany-NP. He is a referral from his PCP- MICKEY Chapman, as a new patient consult for bilateral renal calculi, bladder calculus, and gross hematuria.    Patient reports he saw his PCP secondary to bouts of gross hematuria that had become very bothersome to him. The patient apparently has a CT scan on file that was performed on 10/04/2022 when he saw his PCP.      KIDNEYS AND URETERS:  Severe left hydronephrosis and hydroureter secondary to a left distal ureteral calculus measuring 7 mm.  Other nonobstructive bilateral renal calculi.    BLADDER:  Right posterior urinary bladder 1.3 cm calculus.  IMPRESSION:  1.  Severe left hydronephrosis and hydroureter secondary to a left distal ureteral calculus measuring 7 mm.  2.  Right posterior urinary bladder 1.3 cm calculus.     On evaluation in clinic today he is in no apparent discomfort and denies any flank pain  Or discomfort. He denies dysuria or burning with urination. He has had frequency, hesitancy and nocturia 4-5x.He is post ABX therapy Doxycycline per his PCP.  His urine disp stick today show 1+leukocyte esterase, negative nitrite, 1 + protein, 3+microscopic hematuria.      His wife Brittany states that the patient was seen by MICKEY Webster, for a \"hole\" in his ankle. Labs and urinalysis were performed at that time, which revealed 2+ blood. He has a history of renal calculi and hypertension. He then had a CT scan performed. The patient denies having any pain. " "He drinks significant amounts of pop.    Objective   Vital Signs:   Ht 182.9 cm (72\")   Wt 79.4 kg (175 lb)   BMI 23.73 kg/m²       ROS:   Review of Systems   Constitutional: Positive for activity change and fatigue. Negative for appetite change, chills, diaphoresis, fever, unexpected weight gain and unexpected weight loss.   HENT: Negative for congestion, ear discharge, ear pain, nosebleeds, rhinorrhea, sinus pressure and sore throat.    Eyes: Negative for blurred vision, double vision, photophobia, pain, redness and visual disturbance.   Respiratory: Negative for apnea, cough, chest tightness, shortness of breath, wheezing and stridor.    Cardiovascular: Negative for chest pain and palpitations.   Gastrointestinal: Positive for abdominal distention. Negative for abdominal pain, constipation, diarrhea, nausea and vomiting.   Endocrine: Negative for polydipsia, polyphagia and polyuria.   Genitourinary: Positive for decreased urine volume, frequency, hematuria, nocturia and urgency. Negative for difficulty urinating, discharge, dysuria, flank pain, genital sores, penile pain, penile swelling, scrotal swelling, testicular pain and urinary incontinence.   Musculoskeletal: Negative for arthralgias, back pain and joint swelling.   Skin: Negative for pallor, rash and wound.   Neurological: Positive for weakness. Negative for dizziness, tremors, syncope, light-headedness, memory problem and confusion.   Hematological: Bruises/bleeds easily.   Psychiatric/Behavioral: Positive for sleep disturbance and stress. Negative for agitation, behavioral problems and decreased concentration.        Physical Exam  Constitutional:       General: He is in acute distress.      Appearance: He is well-developed. He is obese. He is ill-appearing.   HENT:      Head: Normocephalic and atraumatic.      Right Ear: External ear normal.      Left Ear: External ear normal.   Eyes:      General:         Right eye: No discharge.         Left eye: " No discharge.      Conjunctiva/sclera: Conjunctivae normal.      Pupils: Pupils are equal, round, and reactive to light.   Neck:      Thyroid: No thyromegaly.      Trachea: No tracheal deviation.   Cardiovascular:      Rate and Rhythm: Normal rate and regular rhythm.      Heart sounds: No murmur heard.    No friction rub.   Pulmonary:      Effort: Pulmonary effort is normal. No respiratory distress.      Breath sounds: Normal breath sounds. No stridor.   Abdominal:      General: Bowel sounds are normal. There is distension.      Palpations: Abdomen is soft.      Tenderness: There is abdominal tenderness. There is no guarding.   Genitourinary:     Penis: Normal and uncircumcised. No tenderness or discharge.       Testes: Normal.      Rectum: Normal. Guaiac result negative.      Comments: URINE FREQUENCY, URGENCY, NOCTURIA, HESITANCY  Musculoskeletal:         General: No tenderness or deformity. Normal range of motion.      Cervical back: Normal range of motion and neck supple.   Skin:     General: Skin is warm and dry.      Capillary Refill: Capillary refill takes less than 2 seconds.      Coloration: Skin is pale.   Neurological:      Mental Status: He is alert and oriented to person, place, and time.      Cranial Nerves: No cranial nerve deficit.      Motor: Weakness present.      Coordination: Coordination normal.   Psychiatric:         Behavior: Behavior normal.         Thought Content: Thought content normal.         Judgment: Judgment normal.        Result Review :     UA    Urinalysis 10/7/22   Ketones, UA Negative   Leukocytes, UA Small (1+) (A)   (A) Abnormal value            Urine Culture    Urine Culture 10/7/22   Urine Culture No growth                  There is also a right posterior urinary bladder calculus measuring 1.3 cm.    He has multiple renal calculi in the right kidney measuring at least 4 to 5 mm. The left kidney revealed 4 calculi and the biggest was 1.9 mm, almost 2 cm calculus. Left distal  ureteral calculus measures about 9 mm, which is causing blockage and hydronephrosis in the left kidney. He has a 1.2 cm bladder calculus on the right.    Labs from 10/05/2022 reveal creatinine 0.9. C-reactive protein is elevated at 2.74.    Urinalysis revealed 2+ blood.    Data reviewed: Radiologic studies CT DONE 10/04-Severe left hydronephrosis and hydroureter secondary to a left distal ureteral calculus measuring 7 mm.  Other nonobstructive bilateral renal calculi. WITH A 1.3CM RIGHT BLADDER STONE     Assessment and Plan    Problem List Items Addressed This Visit        Gastrointestinal Abdominal     Bilateral flank pain    Overview     Added automatically from request for surgery [id number]         Relevant Orders    Case Request (Completed)       Genitourinary and Reproductive     Left ureteral stone - Primary    Overview     Added automatically from request for surgery [id number]         Current Assessment & Plan     At this time kidney function is holding up. Reviewed CT scan with the patient and his wife. On Wednesday, 10/12/2022, he will undergo cystolitholapaxy for bladder stone, left ureteroscopy laser lithotripsy, and possible left stent placement with Dr. Gutierrez.          Relevant Orders    Case Request (Completed)    Bladder stone    Overview     Added automatically from request for surgery [id number]         Current Assessment & Plan     On Wednesday, 10/12/2022, he will undergo cystolitholapaxy for bladder stone, left ureteroscopy laser lithotripsy, and possible left stent placement with Dr. Gutierrez.            Relevant Orders    Case Request (Completed)    Bilateral nephrolithiasis    Overview     Added automatically from request for surgery [id number]         Current Assessment & Plan     On Wednesday, 10/12/2022, he will undergo cystolitholapaxy for bladder stone, left ureteroscopy laser lithotripsy, and possible left stent placement with Dr. Gutierrez.          Relevant Orders    Case Request (Completed)     Gross hematuria    Overview     Added automatically from request for surgery [id number]         Relevant Orders    Case Request (Completed)   Other Visit Diagnoses     Frequency of urination        Urine will be sent for culture.    Relevant Orders    POC Urinalysis Dipstick, Automated (Completed)    Urine Culture - Urine, Urine, Clean Catch (Completed)        BILATERAL NEPHROLITHIASIS /BLADDER STONE /Flank Pain / Left UVJ STONE The Patient has been diagnosed with a 7MM  OBSTRUCTING left ureteral calculus/BLADDER STONE and referred to us. He has NO discomfort today, BUT describes his prior  pain as colicky, but tolerable. We have discussed the various parameters regarding spontaneous passage including the notion that a tiny 1-4 mm stone has a 95% high likelihood of spontaneous passage versus a larger stone of greater than 7 mm like he has  being caught up in the upper areas of the urinary tract.      We also discussed the medical management of stone disease and the use of medical expulsive therapy in the form of Flomax. This is used in an off label setting. I also talked about nonoperative management including ambulation and increasing fluids to atleast 2-3L,  and hot tubs as being an effective adjuncts in the treatment of a ureteral stone.  We discussed the absolute relative indicators for intervention including the presence of sepsis, and pain we cannot control as the primary need for urgent intervention.  We discussed placement of a stent if indicated and the management of the stent as well.     PLAN  I Discussed this case with Dr. Gutierrez, who also reviewed  imaging and is agreeable plan of care.      Patient has been scheduled for Cystolitholapaxy procedure  With Left Ureteroscopy Laser Lithotripsy, with possible left stent on Monday, 10/10/2022    Patient has adequate NON Narcotic pain medication just in case and Nausea medication and Flomax for medical expulsive therapy.      We discussed treatment options for  flank pain with patient encouraged to continue conservative therapy alternating NSAID/Tylenol as tolerated, Also including hot baths, showers, warm compresses to lower back as tolerated. Also encouraged walking, physical therapy, light exercises as tolerated    Rest is the most important treatment in the case of flank pain. Rest and physical therapy are enough to improve minor pain. Discussed to monitor her daily routine with prevention of flank pain by: At least Drinking 8 glasses of water per day, Limiting your alcohol consumption.  Having a healthy diet containing fruits, vegetables, and a lot of fluids, Practicing safe sex.  Also maintaining proper hygiene of your body as well as the environment.    Will follow up with patient post procedure.    He may return sooner if need be or go to Er if any worsening symptoms    Patient/wife  is agreeable plan of care.    Patient reports that he is not currently experiencing any symptoms of urinary incontinence.    BMI is within normal parameters. No other follow-up for BMI required.    RADIOLOGY (CT AND/OR KUB):    CT Abdomen and Pelvis: Results for orders placed in visit on 01/21/14    CT ABDOMEN PELVIS WITH AND WITHOUT CONTRAST    Narrative  EXAM:  CT ABDOMEN WITHOUT CONTRAST AND CT PELVIS WITHOUT CONTRAST  CT ABDOMEN WITH CONTRAST AND CT PELVIS WITH CONTRAST    REASON: 58-year-old with hematuria    PROCEDURE:    Axial images were acquired from the lung bases through the  pubic symphysis initially without any IV contrast. Images were then  acquired from the lung bases through the pubic symphysis during IV  contrast and then on a delayed basis.    FINDINGS: The unenhanced study shows nonobstructing stones within both  kidneys.  There is, however, obstructive uropathy on the left. There is  hydronephrosis and hydroureter. Several stones are present in the left  ureter. The largest stone is just at the left UVJ. It measures 7.4 mm.  More proximal stone is 6.3 mm.    I do  not see any right-sided ureteral stones.    The bladder wall is slightly thickened and the prostate gland is  enlarged.    The contrasted study shows no solid renal mass.    The liver and spleen are homogeneous with the exception of 2 low  attenuation lesions in the liver, probably hepatic cysts.    No free fluid or walled-off fluid collections are seen.    IMPRESSION-    Obstructive uropathy on the left due to several distal  left ureteral stones. The largest is at the left UVJ.    - ARELI Meza Radiologist- HILARIO CLEMENTS  Releasing RadiologistFaheem CLEMENTS  Released Date Time- 01/21/14 1126  ------------------------------------------------------------------------------     CT Stone Protocol: No results found for this or any previous visit.     KUB: Results for orders placed in visit on 01/28/14    X-RAY ABDOMEN KUB    Narrative  EXAM: SUPINE VIEW OF THE ABDOMEN    REASON FOR EXAM: To evaluate stones    Today's study is compared with the previous CT scan from the 21st. There  are multiple calcifications seen projected over the collecting systems  of both kidneys more prominent on the left than right. There are stones  in the upper, mid and lower pole collecting system. There were some  stones in the intrarenal collecting system of the left kidney as well.  On the KUB no stones are identifiable along the course of the ureters.  The bowel gas pattern in the abdomen was unremarkable.    - ARELI Meza Radiologist- CROW BRIONES  Releasing Rad BRIONES  Released Date Time- 01/29/14 1059  ------------------------------------------------------------------------------       LABS (3 MONTHS):    Pre-Admission Testing on 10/07/2022   Component Date Value Ref Range Status   • QT Interval 10/07/2022 406  ms Preliminary   • QTC Interval 10/07/2022 447  ms Preliminary   Office Visit on 10/07/2022   Component Date Value Ref Range Status   • Color  10/07/2022 Yellow  Yellow, Straw, Dark Yellow, Ramya Final   • Clarity, UA 10/07/2022 Cloudy (A)  Clear Final   • Specific Gravity  10/07/2022 1.015  1.005 - 1.030 Final   • pH, Urine 10/07/2022 7.0  5.0 - 8.0 Final   • Leukocytes 10/07/2022 Small (1+) (A)  Negative Final   • Nitrite, UA 10/07/2022 Negative  Negative Final   • Protein, POC 10/07/2022 1+ (A)  Negative mg/dL Final   • Glucose, UA 10/07/2022 Negative  Negative mg/dL Final   • Ketones, UA 10/07/2022 Negative  Negative Final   • Urobilinogen, UA 10/07/2022 Normal  Normal, 0.2 E.U./dL Final   • Bilirubin 10/07/2022 Negative  Negative Final   • Blood, UA 10/07/2022 3+ (A)  Negative Final   • Lot Number 10/07/2022 [id number]   Final   • Expiration Date 10/07/2022 [id number]   Final   • Urine Culture 10/07/2022 No growth   Final   Hospital Outpatient Visit on 10/05/2022   Component Date Value Ref Range Status   • Glucose 10/05/2022 91  65 - 99 mg/dL Final   • BUN 10/05/2022 26 (H)  8 - 23 mg/dL Final   • Creatinine 10/05/2022 0.90  0.76 - 1.27 mg/dL Final   • Sodium 10/05/2022 141  136 - 145 mmol/L Final   • Potassium 10/05/2022 3.7  3.5 - 5.2 mmol/L Final   • Chloride 10/05/2022 106  98 - 107 mmol/L Final   • CO2 10/05/2022 22.8  22.0 - 29.0 mmol/L Final   • Calcium 10/05/2022 9.4  8.6 - 10.5 mg/dL Final   • Total Protein 10/05/2022 6.4  6.0 - 8.5 g/dL Final   • Albumin 10/05/2022 3.90  3.50 - 5.20 g/dL Final   • ALT (SGPT) 10/05/2022 14  1 - 41 U/L Final   • AST (SGOT) 10/05/2022 17  1 - 40 U/L Final   • Alkaline Phosphatase 10/05/2022 59  39 - 117 U/L Final   • Total Bilirubin 10/05/2022 0.6  0.0 - 1.2 mg/dL Final   • Globulin 10/05/2022 2.5  gm/dL Final   • A/G Ratio 10/05/2022 1.6  g/dL Final   • BUN/Creatinine Ratio 10/05/2022 28.9 (H)  7.0 - 25.0 Final   • Anion Gap 10/05/2022 12.2  5.0 - 15.0 mmol/L Final   • eGFR 10/05/2022 94.8  >60.0 mL/min/1.73 Final    National Kidney Foundation and American Society of Nephrology (ASN) Task Force recommended  calculation based on the Chronic Kidney Disease Epidemiology Collaboration (CKD-EPI) equation refit without adjustment for race.   • C-Reactive Protein 10/05/2022 2.74 (H)  0.00 - 0.50 mg/dL Final   • Sed Rate 10/05/2022 18  0 - 20 mm/hr Final   • Prealbumin 10/05/2022 22.2  20.0 - 40.0 mg/dL Final   • WBC 10/05/2022 11.58 (H)  3.40 - 10.80 10*3/mm3 Final   • RBC 10/05/2022 4.46  4.14 - 5.80 10*6/mm3 Final   • Hemoglobin 10/05/2022 13.5  13.0 - 17.7 g/dL Final   • Hematocrit 10/05/2022 41.5  37.5 - 51.0 % Final   • MCV 10/05/2022 93.0  79.0 - 97.0 fL Final   • MCH 10/05/2022 30.3  26.6 - 33.0 pg Final   • MCHC 10/05/2022 32.5  31.5 - 35.7 g/dL Final   • RDW 10/05/2022 12.9  12.3 - 15.4 % Final   • RDW-SD 10/05/2022 44.0  37.0 - 54.0 fl Final   • MPV 10/05/2022 10.1  6.0 - 12.0 fL Final   • Platelets 10/05/2022 199  140 - 450 10*3/mm3 Final   • Neutrophil % 10/05/2022 77.8 (H)  42.7 - 76.0 % Final   • Lymphocyte % 10/05/2022 11.3 (L)  19.6 - 45.3 % Final   • Monocyte % 10/05/2022 7.8  5.0 - 12.0 % Final   • Eosinophil % 10/05/2022 2.4  0.3 - 6.2 % Final   • Basophil % 10/05/2022 0.4  0.0 - 1.5 % Final   • Immature Grans % 10/05/2022 0.3  0.0 - 0.5 % Final   • Neutrophils, Absolute 10/05/2022 9.00 (H)  1.70 - 7.00 10*3/mm3 Final   • Lymphocytes, Absolute 10/05/2022 1.31  0.70 - 3.10 10*3/mm3 Final   • Monocytes, Absolute 10/05/2022 0.90  0.10 - 0.90 10*3/mm3 Final   • Eosinophils, Absolute 10/05/2022 0.28  0.00 - 0.40 10*3/mm3 Final   • Basophils, Absolute 10/05/2022 0.05  0.00 - 0.20 10*3/mm3 Final   • Immature Grans, Absolute 10/05/2022 0.04  0.00 - 0.05 10*3/mm3 Final   • nRBC 10/05/2022 0.0  0.0 - 0.2 /100 WBC Final   • Wound Culture 10/05/2022 Light growth (2+) Normal Skin Mariah   Final   • Gram Stain 10/05/2022 Rare (1+) WBCs seen   Final   • Gram Stain 10/05/2022 Rare (1+) Gram positive cocci in pairs   Final   Hospital Outpatient Visit on 10/04/2022   Component Date Value Ref Range Status   • Creatinine  10/04/2022 0.80  0.60 - 1.30 mg/dL Final    Serial Number: [id number]:  [id number]        Smoking Cessation Counseling:  Never a smoker.  Patient does not currently use any tobacco products.     Follow Up   Return in about 3 days (around 10/10/2022) for Next scheduled follow up, With Dr. Gutierrez FOR RIGHT CYSTOLITHOLAPXY/LEFT URETERESCOPY WITH STENT.    Patient was given instructions and counseling regarding his condition or for health maintenance advice. Please see specific information pulled into the AVS if appropriate.          This document has been electronically signed by Griselda Cheng-Akwa, APRN   October 8, 2022 23:55 EDT      Dictated Utilizing Dragon Dictation: Part of this note may be an electronic transcription/translation of spoken language to printed text using the Dragon Dictation System.       Transcribed from ambient dictation for Griselda Cheng-Akwa, APRN by Lis Webster.  10/07/22   08:45 EDT    Patient or patient representative verbalized consent to the visit recording.  I have personally performed the services described in this document as transcribed by the above individual, and it is both accurate and complete.  Griselda Cheng-Akwa, APRN  10/9/2022  00:00 EDT     Transcribed from ambient dictation for Griselda Cheng-Akwa, APRN by Lis Webster.  10/07/22   09:33 EDT

## 2022-10-07 NOTE — DISCHARGE INSTRUCTIONS

## 2022-10-08 LAB
BACTERIA SPEC AEROBE CULT: NO GROWTH
BACTERIA SPEC AEROBE CULT: NORMAL
GRAM STN SPEC: NORMAL
GRAM STN SPEC: NORMAL

## 2022-10-09 LAB
QT INTERVAL: 406 MS
QTC INTERVAL: 447 MS

## 2022-10-10 ENCOUNTER — ANESTHESIA (OUTPATIENT)
Dept: PERIOP | Facility: HOSPITAL | Age: 67
End: 2022-10-10

## 2022-10-10 ENCOUNTER — HOSPITAL ENCOUNTER (OUTPATIENT)
Facility: HOSPITAL | Age: 67
Setting detail: HOSPITAL OUTPATIENT SURGERY
Discharge: HOME | End: 2022-10-10
Attending: UROLOGY | Admitting: UROLOGY

## 2022-10-10 ENCOUNTER — ANESTHESIA EVENT (OUTPATIENT)
Dept: PERIOP | Facility: HOSPITAL | Age: 67
End: 2022-10-10

## 2022-10-10 ENCOUNTER — APPOINTMENT (OUTPATIENT)
Dept: GENERAL RADIOLOGY | Facility: HOSPITAL | Age: 67
End: 2022-10-10

## 2022-10-10 ENCOUNTER — APPOINTMENT (OUTPATIENT)
Dept: WOUND CARE | Facility: HOSPITAL | Age: 67
End: 2022-10-10

## 2022-10-10 VITALS
TEMPERATURE: 98.1 F | HEIGHT: 74 IN | SYSTOLIC BLOOD PRESSURE: 136 MMHG | RESPIRATION RATE: 17 BRPM | DIASTOLIC BLOOD PRESSURE: 82 MMHG | HEART RATE: 72 BPM | WEIGHT: 172.4 LBS | BODY MASS INDEX: 22.13 KG/M2 | OXYGEN SATURATION: 99 %

## 2022-10-10 DIAGNOSIS — R35.0 FREQUENCY OF URINATION: ICD-10-CM

## 2022-10-10 DIAGNOSIS — N20.0 BILATERAL NEPHROLITHIASIS: ICD-10-CM

## 2022-10-10 DIAGNOSIS — R31.0 GROSS HEMATURIA: ICD-10-CM

## 2022-10-10 DIAGNOSIS — N20.1 LEFT URETERAL STONE: ICD-10-CM

## 2022-10-10 DIAGNOSIS — R10.9 BILATERAL FLANK PAIN: ICD-10-CM

## 2022-10-10 DIAGNOSIS — N21.0 BLADDER STONE: ICD-10-CM

## 2022-10-10 PROCEDURE — 0 MEPERIDINE PER 100 MG: Performed by: NURSE ANESTHETIST, CERTIFIED REGISTERED

## 2022-10-10 PROCEDURE — C1769 GUIDE WIRE: HCPCS | Performed by: UROLOGY

## 2022-10-10 PROCEDURE — 52318 REMOVE BLADDER STONE: CPT | Performed by: UROLOGY

## 2022-10-10 PROCEDURE — 82365 CALCULUS SPECTROSCOPY: CPT | Performed by: UROLOGY

## 2022-10-10 PROCEDURE — 52332 CYSTOSCOPY AND TREATMENT: CPT | Performed by: UROLOGY

## 2022-10-10 PROCEDURE — 25010000002 PROPOFOL 10 MG/ML EMULSION: Performed by: NURSE ANESTHETIST, CERTIFIED REGISTERED

## 2022-10-10 PROCEDURE — 76000 FLUOROSCOPY <1 HR PHYS/QHP: CPT

## 2022-10-10 PROCEDURE — 25010000002 ONDANSETRON PER 1 MG: Performed by: NURSE ANESTHETIST, CERTIFIED REGISTERED

## 2022-10-10 PROCEDURE — 25010000002 FENTANYL CITRATE (PF) 50 MCG/ML SOLUTION: Performed by: NURSE ANESTHETIST, CERTIFIED REGISTERED

## 2022-10-10 PROCEDURE — 25010000002 IOPAMIDOL 61 % SOLUTION: Performed by: UROLOGY

## 2022-10-10 PROCEDURE — C2617 STENT, NON-COR, TEM W/O DEL: HCPCS | Performed by: UROLOGY

## 2022-10-10 PROCEDURE — 76000 FLUOROSCOPY <1 HR PHYS/QHP: CPT | Performed by: RADIOLOGY

## 2022-10-10 PROCEDURE — 25010000002 GENTAMICIN PER 80 MG: Performed by: NURSE PRACTITIONER

## 2022-10-10 DEVICE — URETERAL STENT
Type: IMPLANTABLE DEVICE | Site: URETER | Status: FUNCTIONAL
Brand: POLARIS™ ULTRA

## 2022-10-10 RX ORDER — HYDROCODONE BITARTRATE AND ACETAMINOPHEN 10; 325 MG/1; MG/1
1 TABLET ORAL EVERY 4 HOURS PRN
Qty: 16 TABLET | Refills: 0 | Status: SHIPPED | OUTPATIENT
Start: 2022-10-10

## 2022-10-10 RX ORDER — SODIUM CHLORIDE 0.9 % (FLUSH) 0.9 %
10 SYRINGE (ML) INJECTION AS NEEDED
Status: DISCONTINUED | OUTPATIENT
Start: 2022-10-10 | End: 2022-10-10 | Stop reason: HOSPADM

## 2022-10-10 RX ORDER — MIDAZOLAM HYDROCHLORIDE 1 MG/ML
1 INJECTION INTRAMUSCULAR; INTRAVENOUS
Status: DISCONTINUED | OUTPATIENT
Start: 2022-10-10 | End: 2022-10-10 | Stop reason: HOSPADM

## 2022-10-10 RX ORDER — GENTAMICIN SULFATE 80 MG/100ML
80 INJECTION, SOLUTION INTRAVENOUS ONCE
Status: COMPLETED | OUTPATIENT
Start: 2022-10-10 | End: 2022-10-10

## 2022-10-10 RX ORDER — OXYCODONE HYDROCHLORIDE AND ACETAMINOPHEN 5; 325 MG/1; MG/1
1 TABLET ORAL ONCE AS NEEDED
Status: DISCONTINUED | OUTPATIENT
Start: 2022-10-10 | End: 2022-10-10 | Stop reason: HOSPADM

## 2022-10-10 RX ORDER — IPRATROPIUM BROMIDE AND ALBUTEROL SULFATE 2.5; .5 MG/3ML; MG/3ML
3 SOLUTION RESPIRATORY (INHALATION) ONCE AS NEEDED
Status: DISCONTINUED | OUTPATIENT
Start: 2022-10-10 | End: 2022-10-10 | Stop reason: HOSPADM

## 2022-10-10 RX ORDER — SODIUM CHLORIDE, SODIUM LACTATE, POTASSIUM CHLORIDE, CALCIUM CHLORIDE 600; 310; 30; 20 MG/100ML; MG/100ML; MG/100ML; MG/100ML
100 INJECTION, SOLUTION INTRAVENOUS ONCE AS NEEDED
Status: DISCONTINUED | OUTPATIENT
Start: 2022-10-10 | End: 2022-10-10 | Stop reason: HOSPADM

## 2022-10-10 RX ORDER — MIDAZOLAM HYDROCHLORIDE 1 MG/ML
0.5 INJECTION INTRAMUSCULAR; INTRAVENOUS
Status: DISCONTINUED | OUTPATIENT
Start: 2022-10-10 | End: 2022-10-10 | Stop reason: HOSPADM

## 2022-10-10 RX ORDER — FENTANYL CITRATE 50 UG/ML
INJECTION, SOLUTION INTRAMUSCULAR; INTRAVENOUS AS NEEDED
Status: DISCONTINUED | OUTPATIENT
Start: 2022-10-10 | End: 2022-10-10 | Stop reason: SURG

## 2022-10-10 RX ORDER — SODIUM CHLORIDE 0.9 % (FLUSH) 0.9 %
10 SYRINGE (ML) INJECTION EVERY 12 HOURS SCHEDULED
Status: DISCONTINUED | OUTPATIENT
Start: 2022-10-10 | End: 2022-10-10 | Stop reason: HOSPADM

## 2022-10-10 RX ORDER — MAGNESIUM HYDROXIDE 1200 MG/15ML
LIQUID ORAL AS NEEDED
Status: DISCONTINUED | OUTPATIENT
Start: 2022-10-10 | End: 2022-10-10 | Stop reason: HOSPADM

## 2022-10-10 RX ORDER — ONDANSETRON 2 MG/ML
4 INJECTION INTRAMUSCULAR; INTRAVENOUS AS NEEDED
Status: DISCONTINUED | OUTPATIENT
Start: 2022-10-10 | End: 2022-10-10 | Stop reason: HOSPADM

## 2022-10-10 RX ORDER — SODIUM CHLORIDE, SODIUM LACTATE, POTASSIUM CHLORIDE, CALCIUM CHLORIDE 600; 310; 30; 20 MG/100ML; MG/100ML; MG/100ML; MG/100ML
INJECTION, SOLUTION INTRAVENOUS CONTINUOUS PRN
Status: DISCONTINUED | OUTPATIENT
Start: 2022-10-10 | End: 2022-10-10 | Stop reason: SURG

## 2022-10-10 RX ORDER — PROPOFOL 10 MG/ML
VIAL (ML) INTRAVENOUS AS NEEDED
Status: DISCONTINUED | OUTPATIENT
Start: 2022-10-10 | End: 2022-10-10 | Stop reason: SURG

## 2022-10-10 RX ORDER — MEPERIDINE HYDROCHLORIDE 25 MG/ML
12.5 INJECTION INTRAMUSCULAR; INTRAVENOUS; SUBCUTANEOUS
Status: DISCONTINUED | OUTPATIENT
Start: 2022-10-10 | End: 2022-10-10 | Stop reason: HOSPADM

## 2022-10-10 RX ORDER — ONDANSETRON 2 MG/ML
INJECTION INTRAMUSCULAR; INTRAVENOUS AS NEEDED
Status: DISCONTINUED | OUTPATIENT
Start: 2022-10-10 | End: 2022-10-10 | Stop reason: SURG

## 2022-10-10 RX ORDER — FENTANYL CITRATE 50 UG/ML
50 INJECTION, SOLUTION INTRAMUSCULAR; INTRAVENOUS
Status: DISCONTINUED | OUTPATIENT
Start: 2022-10-10 | End: 2022-10-10 | Stop reason: HOSPADM

## 2022-10-10 RX ORDER — FAMOTIDINE 10 MG/ML
INJECTION, SOLUTION INTRAVENOUS AS NEEDED
Status: DISCONTINUED | OUTPATIENT
Start: 2022-10-10 | End: 2022-10-10 | Stop reason: SURG

## 2022-10-10 RX ORDER — SODIUM CHLORIDE, SODIUM LACTATE, POTASSIUM CHLORIDE, CALCIUM CHLORIDE 600; 310; 30; 20 MG/100ML; MG/100ML; MG/100ML; MG/100ML
125 INJECTION, SOLUTION INTRAVENOUS ONCE
Status: COMPLETED | OUTPATIENT
Start: 2022-10-10 | End: 2022-10-10

## 2022-10-10 RX ADMIN — FAMOTIDINE 20 MG: 10 INJECTION, SOLUTION INTRAVENOUS at 09:03

## 2022-10-10 RX ADMIN — PROPOFOL 150 MG: 10 INJECTION, EMULSION INTRAVENOUS at 09:08

## 2022-10-10 RX ADMIN — MEPERIDINE HYDROCHLORIDE 12.5 MG: 25 INJECTION INTRAMUSCULAR; INTRAVENOUS; SUBCUTANEOUS at 10:06

## 2022-10-10 RX ADMIN — FENTANYL CITRATE 50 MCG: 50 INJECTION INTRAMUSCULAR; INTRAVENOUS at 09:03

## 2022-10-10 RX ADMIN — ONDANSETRON 4 MG: 2 INJECTION INTRAMUSCULAR; INTRAVENOUS at 09:03

## 2022-10-10 RX ADMIN — SODIUM CHLORIDE, POTASSIUM CHLORIDE, SODIUM LACTATE AND CALCIUM CHLORIDE: 600; 310; 30; 20 INJECTION, SOLUTION INTRAVENOUS at 09:01

## 2022-10-10 RX ADMIN — FENTANYL CITRATE 50 MCG: 50 INJECTION INTRAMUSCULAR; INTRAVENOUS at 09:49

## 2022-10-10 RX ADMIN — GENTAMICIN SULFATE 80 MG: 80 INJECTION, SOLUTION INTRAVENOUS at 09:11

## 2022-10-10 RX ADMIN — SODIUM CHLORIDE, POTASSIUM CHLORIDE, SODIUM LACTATE AND CALCIUM CHLORIDE 125 ML/HR: 600; 310; 30; 20 INJECTION, SOLUTION INTRAVENOUS at 08:58

## 2022-10-10 RX ADMIN — PROPOFOL 50 MG: 10 INJECTION, EMULSION INTRAVENOUS at 09:09

## 2022-10-10 NOTE — ANESTHESIA POSTPROCEDURE EVALUATION
Patient: Broderick Pereira    Procedure Summary     Date: 10/10/22 Room / Location: [facility] OR  /  [facility] OR    Anesthesia Start: 0901 Anesthesia Stop: 0958    Procedures:       CYSTOLITHOLAPAXY WITH LASER      URETEROSCOPY RETROGRADE PYELOGRAM HOLMIUM LASER STENT INSERTION (Left) Diagnosis:       Left ureteral stone      Bladder stone      Bilateral nephrolithiasis      Bilateral flank pain      Gross hematuria      (Left ureteral stone [N20.1])      (Bladder stone [N21.0])      (Bilateral nephrolithiasis [N20.0])      (Bilateral flank pain [R10.9])      (Gross hematuria [R31.0])    Surgeons: Merrick Gutierrez MD Provider: Germán Rhoades DO    Anesthesia Type: general ASA Status: 2          Anesthesia Type: general    Vitals  Vitals Value Taken Time   /87 10/10/22 1024   Temp 97.8 °F (36.6 °C) 10/10/22 0959   Pulse 75 10/10/22 1024   Resp 14 10/10/22 1024   SpO2 97 % 10/10/22 1024           Post Anesthesia Care and Evaluation    Patient location during evaluation: PACU  Patient participation: complete - patient participated  Level of consciousness: awake  Pain score: 0  Pain management: adequate    Airway patency: patent  Anesthetic complications: No anesthetic complications  PONV Status: none  Cardiovascular status: hemodynamically stable  Respiratory status: nasal cannula  Hydration status: acceptable

## 2022-10-10 NOTE — OP NOTE
CYSTOLITHOLAPAXY WITH LASER, CYSTOSCOPY URETEROSCOPY RETROGRADE PYELOGRAM HOLMIUM LASER STENT INSERTION  Procedure Note    Broderick Pereira  10/10/2022    Pre-op Diagnosis:   Left ureteral stone [N20.1]  Bladder stone [N21.0]  Bilateral nephrolithiasis [N20.0]  Bilateral flank pain [R10.9]  Gross hematuria [R31.0]    Post-op Diagnosis:     Post-Op Diagnosis Codes:     * Left ureteral stone [N20.1]     * Bladder stone [N21.0]     * Bilateral nephrolithiasis [N20.0]     * Bilateral flank pain [R10.9]     * Gross hematuria [R31.0]    Procedure/CPT® Codes:  67-year-old white male who has a left massive hydronephrosis from a left distal stone and bladder stone he now presents for removal following informed consent he is brought the operative suite via the room we urethrae a large bladder stone  Posteriorly was clearly calcium oxalate monohydrate it was too big to remove and I had to use laser to break it into 6 pieces it was very dense and remove each piece sequentially the left orifice was quite edematous.  I was able to negotiate a scope with a substantial amount of edema and identified the stone broke it and removed all pieces retrograde shows massive hydronephrosis because of the irritation I would recommend leaving a stent in for a good 2 to 3 weeks.  I will follow-up with him based on this    Procedure(s):  CYSTOLITHOLAPAXY WITH LASER  URETEROSCOPY RETROGRADE PYELOGRAM HOLMIUM LASER STENT INSERTION    Surgeon(s):  Merrick Gutierrez MD    Anesthesia: see anesthesia record    Staff:   Circulator: Duane Antonio RN  Scrub Person: France Pino LPN; Juanita Marquez    Estimated Blood Loss: none  Urine Voided: * No values recorded between 10/10/2022  9:02 AM and 10/10/2022  9:58 AM *    Specimens:                ID Type Source Tests Collected by Time   1 (Not marked as sent) :  [facility] Urinary Bladder STONE ANALYSIS Merrick Gutierrez MD 10/10/2022 0952         Drains: 5 x 24 double-J stent  on the left and a Wells catheter    Findings: Bladder stone-calcium oxalate monohydrate ureteral stone calcium oxalate    Blood: N/A    Complications: None    Grafts and Implants: None    Merrick Gutierrez MD     Date: 10/10/2022  Time: 10:01 EDT

## 2022-10-10 NOTE — ANESTHESIA PROCEDURE NOTES
Airway  Urgency: elective    Airway not difficult    General Information and Staff    Patient location during procedure: OR  CRNA/CAA: Ava Allen CRNA    Indications and Patient Condition  Indications for airway management: airway protection    Preoxygenated: yes  MILS maintained throughout  Mask difficulty assessment: 0 - not attempted    Final Airway Details  Final airway type: supraglottic airway      Successful airway: unique  Size 4     Number of attempts at approach: 1  Assessment: lips, teeth, and gum same as pre-op

## 2022-10-10 NOTE — ANESTHESIA PREPROCEDURE EVALUATION
Anesthesia Evaluation                  Airway   Mallampati: I  TM distance: >3 FB  Neck ROM: full  No difficulty expected  Dental - normal exam     Pulmonary - normal exam   (+) sleep apnea,   Cardiovascular - normal exam    (+) hypertension,       Neuro/Psych  GI/Hepatic/Renal/Endo    (+)   renal disease stones,     Musculoskeletal     Abdominal  - normal exam    Bowel sounds: normal.   Substance History      OB/GYN          Other   arthritis,                    Anesthesia Plan    ASA 2     general     intravenous induction     Anesthetic plan, risks, benefits, and alternatives have been provided, discussed and informed consent has been obtained with: patient.        CODE STATUS:

## 2022-10-12 ENCOUNTER — APPOINTMENT (OUTPATIENT)
Dept: WOUND CARE | Facility: HOSPITAL | Age: 67
End: 2022-10-12

## 2022-10-14 ENCOUNTER — APPOINTMENT (OUTPATIENT)
Dept: WOUND CARE | Facility: HOSPITAL | Age: 67
End: 2022-10-14

## 2022-10-14 LAB
CALCIUM OXALATE DIHYDRATE MFR STONE IR: 30 %
COLOR STONE: NORMAL
COM MFR STONE: 70 %
COMPN STONE: NORMAL
LABORATORY COMMENT REPORT: NORMAL
LABORATORY COMMENT REPORT: NORMAL
Lab: NORMAL
Lab: NORMAL
PHOTO: NORMAL
SIZE STONE: NORMAL MM
SPEC SOURCE SUBJ: NORMAL
WT STONE: 764 MG

## 2022-10-17 ENCOUNTER — APPOINTMENT (OUTPATIENT)
Dept: WOUND CARE | Facility: HOSPITAL | Age: 67
End: 2022-10-17

## 2022-10-19 ENCOUNTER — HOSPITAL ENCOUNTER (OUTPATIENT)
Dept: WOUND CARE | Facility: HOSPITAL | Age: 67
Discharge: HOME | End: 2022-10-19
Admitting: NURSE PRACTITIONER

## 2022-10-19 VITALS
DIASTOLIC BLOOD PRESSURE: 90 MMHG | SYSTOLIC BLOOD PRESSURE: 153 MMHG | RESPIRATION RATE: 17 BRPM | TEMPERATURE: 99.1 F | HEART RATE: 94 BPM

## 2022-10-19 NOTE — PROGRESS NOTES
Wound Clinic Note  Patient Identification:  Name:  Broderick Pereira  Age:  67 y.o.  Sex:  male  :  1955  MRN:  [id number]   Visit Number:  [id number]  Primary Care Physician:  Allyson Fox APRN     Subjective     Chief complaint:     Left ankle wound    History of presenting illness:     Patient is a 67 y.o. male with past medical history significant for  that presented today for evaluation of left medial ankle. Reports wound has been present for several weeks 3-4 approximately. Unsure of how the area occurred just noticed the area. Denies any trauma to the area that he can recall. He has been applying triple antibiotic to the area. He denies history of diabetes. Is non-smoker. He is currently on Doxycycline started 2022 prescribed by PCP. Denies any prior vascular disease. He has some imaging completed ordered by PCP: Arterial US 2022: Unremarkable exam with no occlusive segments or segments of high-grade. Venous US: No DVT in the left lower extremity on today's exam.  X-ray foot 2022: Soft tissue defect in the posterior subcutaneous tissues at the level of the distal tibia, but no erosion of bone. X-ray ankle 2022: No evidence of osteomyelitis on today's exam.    Interval History:   10/05/2022: Patient seen in clinic today for follow-up to left medial ankle wound. He has had wound vac in place and wife and been performing changes at home. He has increase in swelling and erythema. He states he has been on his feet with work the past several days. Denies any fever or chills. Reports some increase in pain after being on his feet for several hours at work.     10/19/2022: Patient seen in clinic today for follow-up to left medial ankle ulcer. Wound unchanged, continues with yellow slough. Denies any fever or chills. He reports adding meghan to diet and increasing protein intake to help promote wound healing. Swelling appears to be controlled. No new issues or concerns reported.    ---------------------------------------------------------------------------------------------------------------------   Review of Systems   Constitutional: Negative for chills and fever.   HENT: Negative for congestion and rhinorrhea.    Respiratory: Negative for cough and shortness of breath.    Cardiovascular: Positive for leg swelling. Negative for chest pain.        Intermittent    Gastrointestinal: Negative for nausea and vomiting.   Musculoskeletal: Negative for back pain and gait problem.   Skin: Positive for wound.   Hematological: Does not bruise/bleed easily.      ---------------------------------------------------------------------------------------------------------------------   Past Medical History:   Diagnosis Date   • Arthritis    • Hypertension    • Kidney stone    • Sleep apnea      Past Surgical History:   Procedure Laterality Date   • ABDOMINAL SURGERY     • CYSTOLITHALOPAXY PERCUTANEOUS N/A 10/10/2022    Procedure: CYSTOLITHOLAPAXY WITH LASER;  Surgeon: Merrick Gutierrez MD;  Location: [facility];  Service: Urology;  Laterality: N/A;   • CYSTOSCOPY, URETEROSCOPY, RETROGRADE PYELOGRAM, STENT INSERTION Left 10/10/2022    Procedure: URETEROSCOPY RETROGRADE PYELOGRAM HOLMIUM LASER STENT INSERTION;  Surgeon: Merrick Gutierrez MD;  Location: [facility];  Service: Urology;  Laterality: Left;   • FRACTURE SURGERY     • GALLBLADDER SURGERY      15 or more years ago   • KIDNEY STONE SURGERY     • LEG SURGERY Left 2002    has dori in left leg     Family History   Problem Relation Age of Onset   • Hypertension Sister      Social History     Socioeconomic History   • Marital status:    Tobacco Use   • Smoking status: Never   • Smokeless tobacco: Never   Vaping Use   • Vaping Use: Never used   Substance and Sexual Activity   • Alcohol use: Never   • Drug use: Never   • Sexual activity: Defer     Partners: Female      ---------------------------------------------------------------------------------------------------------------------   Allergies:  Sulfa antibiotics  ---------------------------------------------------------------------------------------------------------------------  Objective     ---------------------------------------------------------------------------------------------------------------------   Vital Signs:  Temp:  [99.1 °F (37.3 °C)] 99.1 °F (37.3 °C)  Heart Rate:  [94] 94  Resp:  [17] 17  BP: (153)/(90) 153/90  No data found.  There were no vitals filed for this visit.  There is no height or weight on file to calculate BMI.  Wt Readings from Last 3 Encounters:   10/10/22 78.2 kg (172 lb 6.4 oz)   10/07/22 79.4 kg (175 lb)   09/28/22 79.4 kg (175 lb)       ---------------------------------------------------------------------------------------------------------------------   Physical Exam  Constitutional: Vital sign were reviewed (temperature, pulse, respiration, and blood pressure) and found to be within expected limits per nursing, general appearance was assessed and the patient was found to be in no distress and calm and comfortable appears  Skin: Temperature:normal turgor and temperatureColor: normal, no cyanosis, jaundice, pallor or bruising, Moisture: dry,Nails: thickened yellow toenails bed, Hair:thinning to lower extremities .  Physical Exam  Wound Assessment:  Location: Left, medial, ankle  Dressing Appearance: dressingappearance: clean  Closure: NA  Changes since last exam: no changes stable, without significant changes   Etiology and classification: non-pressure chronic ulcer   Wound bed structures/characteristics: full-thickness (subcutaneous tissue is exposed in at least a portion of the wound), fascia is exposed   Edges moist  Periwound characteristics: moist  Periwound Temperature: normal turgor and temperature  Drainage characteristics: moderate, serous   Perfusion characteristics: Pulses  palpable, capillary refill adequate    Wound Goal (s):Closure, Epithelization, Free of infection and No further symptoms  Assessment & Plan      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  Time out was called prior to procedure.   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 Fascia was removed by surgical excision  Instrument(s) used: Curette 4mm  Anesthesia:After checking  patient allergies,lidocaine topical 2% was administered to provide anesthesia. and 2% Injectable lidocaine was used  Tissue removed: fascia, Percent Removed 80%  Culture or Biopsy: culture and sensitivity  Estimated Blood Loss: Moderate  Hemostasis Obtained: pressure and Surgicel       Multilayer Compression (MLC)-  Today's procedure is the application of a multilayer compression bandage. I have informed the patient of the risks and benefits of this procedure and they have had the opportunity to ask questions. Procedure was performed in a clean field. Multi-layer compression bandage was applied per 's instructions. Patient tolerated procedure well and without complaints of pain or discomfort. Patient was educated regarding signs and symptoms of over compression, including unrelieved pain, toes turning red or purple, or numbness in foot. Patient was instructed to manually remove outer layer (without scissors) and contact clinic immediately , applied to left lower extremity.       Clinical Impression:Moderate Complexity    Follow-up: 1 week    MICKEY Brasher   [facility]  10/19/22  10:02 EDT

## 2022-10-21 ENCOUNTER — HOSPITAL ENCOUNTER (OUTPATIENT)
Dept: WOUND CARE | Facility: HOSPITAL | Age: 67
Discharge: HOME | End: 2022-10-21
Admitting: NURSE PRACTITIONER

## 2022-10-21 VITALS
TEMPERATURE: 98.7 F | SYSTOLIC BLOOD PRESSURE: 156 MMHG | HEART RATE: 99 BPM | RESPIRATION RATE: 18 BRPM | DIASTOLIC BLOOD PRESSURE: 89 MMHG

## 2022-10-21 DIAGNOSIS — L97.322 NON-PRESSURE CHRONIC ULCER OF LEFT ANKLE WITH FAT LAYER EXPOSED: Primary | ICD-10-CM

## 2022-10-21 PROCEDURE — 97602 WOUND(S) CARE NON-SELECTIVE: CPT

## 2022-10-21 RX ORDER — CLONIDINE HYDROCHLORIDE 0.2 MG/1
TABLET ORAL
COMMUNITY
Start: 2014-01-04

## 2022-10-21 RX ORDER — AMLODIPINE BESYLATE 5 MG/1
5 TABLET ORAL DAILY
COMMUNITY
Start: 2022-09-21

## 2022-10-24 ENCOUNTER — HOSPITAL ENCOUNTER (OUTPATIENT)
Dept: GENERAL RADIOLOGY | Facility: HOSPITAL | Age: 67
Discharge: HOME | End: 2022-10-24
Admitting: UROLOGY

## 2022-10-24 ENCOUNTER — APPOINTMENT (OUTPATIENT)
Dept: WOUND CARE | Facility: HOSPITAL | Age: 67
End: 2022-10-24

## 2022-10-24 ENCOUNTER — PROCEDURE VISIT (OUTPATIENT)
Dept: UROLOGY | Facility: CLINIC | Age: 67
End: 2022-10-24

## 2022-10-24 VITALS — BODY MASS INDEX: 22.13 KG/M2 | HEIGHT: 74 IN

## 2022-10-24 DIAGNOSIS — R35.0 FREQUENCY OF URINATION: ICD-10-CM

## 2022-10-24 DIAGNOSIS — N20.1 LEFT URETERAL STONE: Primary | ICD-10-CM

## 2022-10-24 DIAGNOSIS — N20.1 LEFT URETERAL STONE: ICD-10-CM

## 2022-10-24 PROCEDURE — 52310 CYSTOSCOPY AND TREATMENT: CPT | Performed by: UROLOGY

## 2022-10-24 PROCEDURE — 74018 RADEX ABDOMEN 1 VIEW: CPT

## 2022-10-24 PROCEDURE — 74018 RADEX ABDOMEN 1 VIEW: CPT | Performed by: RADIOLOGY

## 2022-10-24 RX ORDER — GENTAMICIN SULFATE 40 MG/ML
80 INJECTION, SOLUTION INTRAMUSCULAR; INTRAVENOUS ONCE
Status: COMPLETED | OUTPATIENT
Start: 2022-10-24 | End: 2022-10-24

## 2022-10-24 RX ADMIN — GENTAMICIN SULFATE 80 MG: 40 INJECTION, SOLUTION INTRAMUSCULAR; INTRAVENOUS at 15:21

## 2022-10-24 NOTE — PROGRESS NOTES
Chief Complaint:      Chief Complaint   Patient presents with   • Nephrolithiasis     Cysto stent removal        HPI:   67 y.o. male returns today for stent removal he had a difficult highly obstructive stone with massive hydronephrosis and I left the stent in for an extra week.  He is here for stent removal he has very minimal pain    Past Medical History:     Past Medical History:   Diagnosis Date   • Arthritis    • Hypertension    • Kidney stone    • Sleep apnea        Current Meds:     Current Outpatient Medications   Medication Sig Dispense Refill   • amLODIPine (NORVASC) 10 MG tablet      • amLODIPine (NORVASC) 5 MG tablet Take 1 tablet by mouth Daily.     • cloNIDine (CATAPRES) 0.2 MG tablet Take  by mouth.     • doxazosin (CARDURA) 2 MG tablet Take 2 mg by mouth Daily.     • HYDROcodone-acetaminophen (NORCO)  MG per tablet Take 1 tablet by mouth Every 4 (Four) Hours As Needed for Moderate Pain (Pain). 16 tablet 0   • multivitamin with minerals tablet tablet Take 1 tablet by mouth Daily.       No current facility-administered medications for this visit.        Allergies:      Allergies   Allergen Reactions   • Sulfa Antibiotics Rash        Past Surgical History:     Past Surgical History:   Procedure Laterality Date   • ABDOMINAL SURGERY     • CYSTOLITHALOPAXY PERCUTANEOUS N/A 10/10/2022    Procedure: CYSTOLITHOLAPAXY WITH LASER;  Surgeon: Merrick Gutierrez MD;  Location: [facility];  Service: Urology;  Laterality: N/A;   • CYSTOSCOPY, URETEROSCOPY, RETROGRADE PYELOGRAM, STENT INSERTION Left 10/10/2022    Procedure: URETEROSCOPY RETROGRADE PYELOGRAM HOLMIUM LASER STENT INSERTION;  Surgeon: Merrick Gutierrez MD;  Location: [facility];  Service: Urology;  Laterality: Left;   • FRACTURE SURGERY     • GALLBLADDER SURGERY      15 or more years ago   • KIDNEY STONE SURGERY     • LEG SURGERY Left 2002    has doir in left leg       Social History:     Social History     Socioeconomic History   •  Marital status:    Tobacco Use   • Smoking status: Never   • Smokeless tobacco: Never   Vaping Use   • Vaping Use: Never used   Substance and Sexual Activity   • Alcohol use: Never   • Drug use: Never   • Sexual activity: Defer     Partners: Female       Family History:     Family History   Problem Relation Age of Onset   • Hypertension Sister        Review of Systems:     Review of Systems   Constitutional: Negative.    HENT: Negative.    Eyes: Negative.    Respiratory: Negative.    Cardiovascular: Negative.    Gastrointestinal: Negative.    Endocrine: Negative.    Musculoskeletal: Negative.    Allergic/Immunologic: Negative.    Neurological: Negative.    Hematological: Negative.    Psychiatric/Behavioral: Negative.        Physical Exam:     Physical Exam  Vitals and nursing note reviewed.   Constitutional:       Appearance: He is well-developed.   HENT:      Head: Normocephalic and atraumatic.   Eyes:      Conjunctiva/sclera: Conjunctivae normal.      Pupils: Pupils are equal, round, and reactive to light.   Cardiovascular:      Rate and Rhythm: Normal rate and regular rhythm.      Heart sounds: Normal heart sounds.   Pulmonary:      Effort: Pulmonary effort is normal.      Breath sounds: Normal breath sounds.   Abdominal:      General: Bowel sounds are normal.      Palpations: Abdomen is soft.   Genitourinary:     Penis: Normal.       Testes: Normal.   Musculoskeletal:         General: Normal range of motion.      Cervical back: Normal range of motion.   Skin:     General: Skin is warm and dry.   Neurological:      Mental Status: He is alert and oriented to person, place, and time.      Deep Tendon Reflexes: Reflexes are normal and symmetric.   Psychiatric:         Behavior: Behavior normal.         Thought Content: Thought content normal.         Judgment: Judgment normal.         *I have reviewed the following portions of the patient's history: Allergies, current medications, past family history, past  medical history, past social history, past surgical history, problem list, and ROS and confirm it is accurate.    Recent Image (CT and/or KUB):      CT Abdomen and Pelvis: Results for orders placed in visit on 01/21/14    CT ABDOMEN PELVIS WITH AND WITHOUT CONTRAST    Narrative  EXAM:  CT ABDOMEN WITHOUT CONTRAST AND CT PELVIS WITHOUT CONTRAST  CT ABDOMEN WITH CONTRAST AND CT PELVIS WITH CONTRAST    REASON: 58-year-old with hematuria    PROCEDURE:    Axial images were acquired from the lung bases through the  pubic symphysis initially without any IV contrast. Images were then  acquired from the lung bases through the pubic symphysis during IV  contrast and then on a delayed basis.    FINDINGS: The unenhanced study shows nonobstructing stones within both  kidneys.  There is, however, obstructive uropathy on the left. There is  hydronephrosis and hydroureter. Several stones are present in the left  ureter. The largest stone is just at the left UVJ. It measures 7.4 mm.  More proximal stone is 6.3 mm.    I do not see any right-sided ureteral stones.    The bladder wall is slightly thickened and the prostate gland is  enlarged.    The contrasted study shows no solid renal mass.    The liver and spleen are homogeneous with the exception of 2 low  attenuation lesions in the liver, probably hepatic cysts.    No free fluid or walled-off fluid collections are seen.    IMPRESSION-    Obstructive uropathy on the left due to several distal  left ureteral stones. The largest is at the left UVJ.    - ARELI Meza Radiologist- HILARIO CLEMENTS  Releasing Radiologist- HILARIO CLEMENTS  Released Date Time- 01/21/14 1126  ------------------------------------------------------------------------------       CT Stone Protocol: No results found for this or any previous visit.       KUB: Results for orders placed in visit on 01/28/14    X-RAY ABDOMEN KUB    Narrative  EXAM: SUPINE VIEW OF THE ABDOMEN    REASON FOR  EXAM: To evaluate stones    Today's study is compared with the previous CT scan from the 21st. There  are multiple calcifications seen projected over the collecting systems  of both kidneys more prominent on the left than right. There are stones  in the upper, mid and lower pole collecting system. There were some  stones in the intrarenal collecting system of the left kidney as well.  On the KUB no stones are identifiable along the course of the ureters.  The bowel gas pattern in the abdomen was unremarkable.    - ARELI SCHAFER  Reading Radiologist- CROW BRIONES  Releasing Radiologist- CROW BRIONES  Released Date Time- 01/29/14 1059  ------------------------------------------------------------------------------       Labs (past 3 months):      Admission on 10/10/2022, Discharged on 10/10/2022   Component Date Value Ref Range Status   • Stone Source 10/10/2022 Comment   Final    Urinary Bladder   • Color 10/10/2022 Brown   Final   • Size 10/10/2022 13x8  mm Final    Multiple pieces received.  Dimensions of the largest piece  reported.   • Stone Weight 10/10/2022 764  mg Final   • Composition 10/10/2022 Comment   Final    Percentage (Represents the % composition)   • Ca Oxalate - Monohydrate, Stone 10/10/2022 70  % Final   • Ca Oxalate-Dihydrate, Stone 10/10/2022 30  % Final   • Comment 10/10/2022 Comment   Final    Calculus received wet. Wet calculi must be dried before  analysis, which delays reporting of results. Leaving calculi  wet (such as water, saline, blood, urine) may lead to  changes in composition.   • Photo 10/10/2022 Comment   Final    Photograph will follow under a separate cover   • Comments: 10/10/2022 Comment   Final    Physician questions regarding Calculi Analysis contact  [facility] at: [phone].   • Please note 10/10/2022 Comment   Final    Calculi report will follow via computer, mail or   delivery.   • Disclaimer: 10/10/2022 Comment   Final    This test was  developed and its performance characteristics  determined by [facility].  It has not been cleared or approved  by the Food and Drug Administration.   Pre-Admission Testing on 10/07/2022   Component Date Value Ref Range Status   • QT Interval 10/07/2022 406  ms Final   • QTC Interval 10/07/2022 447  ms Final   Office Visit on 10/07/2022   Component Date Value Ref Range Status   • Color 10/07/2022 Yellow  Yellow, Straw, Dark Yellow, Ramya Final   • Clarity, UA 10/07/2022 Cloudy (A)  Clear Final   • Specific Gravity  10/07/2022 1.015  1.005 - 1.030 Final   • pH, Urine 10/07/2022 7.0  5.0 - 8.0 Final   • Leukocytes 10/07/2022 Small (1+) (A)  Negative Final   • Nitrite, UA 10/07/2022 Negative  Negative Final   • Protein, POC 10/07/2022 1+ (A)  Negative mg/dL Final   • Glucose, UA 10/07/2022 Negative  Negative mg/dL Final   • Ketones, UA 10/07/2022 Negative  Negative Final   • Urobilinogen, UA 10/07/2022 Normal  Normal, 0.2 E.U./dL Final   • Bilirubin 10/07/2022 Negative  Negative Final   • Blood, UA 10/07/2022 3+ (A)  Negative Final   • Lot Number 10/07/2022 [id number]   Final   • Expiration Date 10/07/2022 [id number]   Final   • Urine Culture 10/07/2022 No growth   Final   Hospital Outpatient Visit on 10/05/2022   Component Date Value Ref Range Status   • Glucose 10/05/2022 91  65 - 99 mg/dL Final   • BUN 10/05/2022 26 (H)  8 - 23 mg/dL Final   • Creatinine 10/05/2022 0.90  0.76 - 1.27 mg/dL Final   • Sodium 10/05/2022 141  136 - 145 mmol/L Final   • Potassium 10/05/2022 3.7  3.5 - 5.2 mmol/L Final   • Chloride 10/05/2022 106  98 - 107 mmol/L Final   • CO2 10/05/2022 22.8  22.0 - 29.0 mmol/L Final   • Calcium 10/05/2022 9.4  8.6 - 10.5 mg/dL Final   • Total Protein 10/05/2022 6.4  6.0 - 8.5 g/dL Final   • Albumin 10/05/2022 3.90  3.50 - 5.20 g/dL Final   • ALT (SGPT) 10/05/2022 14  1 - 41 U/L Final   • AST (SGOT) 10/05/2022 17  1 - 40 U/L Final   • Alkaline Phosphatase 10/05/2022 59  39 - 117 U/L Final   • Total Bilirubin  10/05/2022 0.6  0.0 - 1.2 mg/dL Final   • Globulin 10/05/2022 2.5  gm/dL Final   • A/G Ratio 10/05/2022 1.6  g/dL Final   • BUN/Creatinine Ratio 10/05/2022 28.9 (H)  7.0 - 25.0 Final   • Anion Gap 10/05/2022 12.2  5.0 - 15.0 mmol/L Final   • eGFR 10/05/2022 94.8  >60.0 mL/min/1.73 Final    National Kidney Foundation and American Society of Nephrology (ASN) Task Force recommended calculation based on the Chronic Kidney Disease Epidemiology Collaboration (CKD-EPI) equation refit without adjustment for race.   • C-Reactive Protein 10/05/2022 2.74 (H)  0.00 - 0.50 mg/dL Final   • Sed Rate 10/05/2022 18  0 - 20 mm/hr Final   • Prealbumin 10/05/2022 22.2  20.0 - 40.0 mg/dL Final   • WBC 10/05/2022 11.58 (H)  3.40 - 10.80 10*3/mm3 Final   • RBC 10/05/2022 4.46  4.14 - 5.80 10*6/mm3 Final   • Hemoglobin 10/05/2022 13.5  13.0 - 17.7 g/dL Final   • Hematocrit 10/05/2022 41.5  37.5 - 51.0 % Final   • MCV 10/05/2022 93.0  79.0 - 97.0 fL Final   • MCH 10/05/2022 30.3  26.6 - 33.0 pg Final   • MCHC 10/05/2022 32.5  31.5 - 35.7 g/dL Final   • RDW 10/05/2022 12.9  12.3 - 15.4 % Final   • RDW-SD 10/05/2022 44.0  37.0 - 54.0 fl Final   • MPV 10/05/2022 10.1  6.0 - 12.0 fL Final   • Platelets 10/05/2022 199  140 - 450 10*3/mm3 Final   • Neutrophil % 10/05/2022 77.8 (H)  42.7 - 76.0 % Final   • Lymphocyte % 10/05/2022 11.3 (L)  19.6 - 45.3 % Final   • Monocyte % 10/05/2022 7.8  5.0 - 12.0 % Final   • Eosinophil % 10/05/2022 2.4  0.3 - 6.2 % Final   • Basophil % 10/05/2022 0.4  0.0 - 1.5 % Final   • Immature Grans % 10/05/2022 0.3  0.0 - 0.5 % Final   • Neutrophils, Absolute 10/05/2022 9.00 (H)  1.70 - 7.00 10*3/mm3 Final   • Lymphocytes, Absolute 10/05/2022 1.31  0.70 - 3.10 10*3/mm3 Final   • Monocytes, Absolute 10/05/2022 0.90  0.10 - 0.90 10*3/mm3 Final   • Eosinophils, Absolute 10/05/2022 0.28  0.00 - 0.40 10*3/mm3 Final   • Basophils, Absolute 10/05/2022 0.05  0.00 - 0.20 10*3/mm3 Final   • Immature Grans, Absolute 10/05/2022 0.04   0.00 - 0.05 10*3/mm3 Final   • nRBC 10/05/2022 0.0  0.0 - 0.2 /100 WBC Final   • Wound Culture 10/05/2022 Light growth (2+) Normal Skin Mariah   Final   • Gram Stain 10/05/2022 Rare (1+) WBCs seen   Final   • Gram Stain 10/05/2022 Rare (1+) Gram positive cocci in pairs   Final   Hospital Outpatient Visit on 10/04/2022   Component Date Value Ref Range Status   • Creatinine 10/04/2022 0.80  0.60 - 1.30 mg/dL Final    Serial Number: [id number]:  [id number]        Procedure:   Cystoscopy and stent removal: Following an appropriate informed consent including the risk of infection, the patient was pretreated with 80 mg of intramuscular gentamicin.  Following this, the patient was prepped and draped in a sterile fashion using the flexible cystoscope. I went ahead and examined the bladder, identified the stent exiting the ureteral orifice.  It was grasped with grasping forceps and removed without complication.  The patient tolerated it well.      Assessment/Plan:   Ureteral calculus-patient has been diagnosed with a ureteral calculus.  We have discussed the various parameters regarding spontaneous passage including the notion that a 2 mm stone has a high likelihood of spontaneous passage versus a larger stone being caught up in the upper areas of the urinary tract.  We also discussed the medical management of stone disease and the use of medical expulsive therapy in the form of Flomax.  This is used in an off label setting.  We discussed the indicators for intervention including  absolute indicators such as sepsis and uncontrollable severe pain, as well as the relative indicators of moderate pain that is well-controlled with various analgesia.  I also talked about nonoperative management including ambulation and increasing fluids and hot tub as being an effective adjuncts in the treatment of a ureteral stone.  He had a ureteral calculus removed has had a stent in for over 2 weeks he has had a removed I will see him back in  3 months for upper tract investigation              This document has been electronically signed by HERNANDEZ BURRIS MD October 24, 2022 14:52 EDT    Dictated Utilizing Dragon Dictation: Part of this note may be an electronic transcription/translation of spoken language to printed text using the Dragon Dictation System.

## 2022-10-26 ENCOUNTER — HOSPITAL ENCOUNTER (OUTPATIENT)
Dept: WOUND CARE | Facility: HOSPITAL | Age: 67
Discharge: HOME | End: 2022-10-26
Admitting: NURSE PRACTITIONER

## 2022-10-26 VITALS
RESPIRATION RATE: 18 BRPM | SYSTOLIC BLOOD PRESSURE: 140 MMHG | TEMPERATURE: 98.9 F | HEART RATE: 79 BPM | DIASTOLIC BLOOD PRESSURE: 86 MMHG

## 2022-10-26 DIAGNOSIS — L97.322 NON-PRESSURE CHRONIC ULCER OF LEFT ANKLE WITH FAT LAYER EXPOSED: Primary | ICD-10-CM

## 2022-10-26 RX ORDER — SODIUM HYPOCHLORITE 2.5 MG/ML
1 SOLUTION TOPICAL AS NEEDED
Status: CANCELLED | OUTPATIENT
Start: 2022-10-26

## 2022-10-26 RX ORDER — CASTOR OIL AND BALSAM, PERU 788; 87 MG/G; MG/G
1 OINTMENT TOPICAL AS NEEDED
Status: CANCELLED | OUTPATIENT
Start: 2022-10-26

## 2022-10-26 RX ORDER — LIDOCAINE HYDROCHLORIDE 20 MG/ML
JELLY TOPICAL AS NEEDED
Status: CANCELLED
Start: 2022-10-26

## 2022-10-26 RX ORDER — SODIUM HYPOCHLORITE 1.25 MG/ML
1 SOLUTION TOPICAL AS NEEDED
Status: CANCELLED | OUTPATIENT
Start: 2022-10-26

## 2022-10-26 RX ORDER — LIDOCAINE HYDROCHLORIDE 20 MG/ML
JELLY TOPICAL AS NEEDED
Status: DISCONTINUED | OUTPATIENT
Start: 2022-10-26 | End: 2022-10-27 | Stop reason: HOSPADM

## 2022-10-26 RX ADMIN — LIDOCAINE HYDROCHLORIDE: 20 JELLY TOPICAL at 08:31

## 2022-10-26 NOTE — PROGRESS NOTES
Wound Clinic Note  Patient Identification:  Name:  Broderick Pereira  Age:  67 y.o.  Sex:  male  :  1955  MRN:  [id number]   Visit Number:  [id number]  Primary Care Physician:  Allyson Fox APRN     Subjective     Chief complaint:     Left ankle wound    History of presenting illness:     Patient is a 67 y.o. male with past medical history significant for  that presented today for evaluation of left medial ankle. Reports wound has been present for several weeks 3-4 approximately. Unsure of how the area occurred just noticed the area. Denies any trauma to the area that he can recall. He has been applying triple antibiotic to the area. He denies history of diabetes. Is non-smoker. He is currently on Doxycycline started 2022 prescribed by PCP. Denies any prior vascular disease. He has some imaging completed ordered by PCP: Arterial US 2022: Unremarkable exam with no occlusive segments or segments of high-grade. Venous US: No DVT in the left lower extremity on today's exam.  X-ray foot 2022: Soft tissue defect in the posterior subcutaneous tissues at the level of the distal tibia, but no erosion of bone. X-ray ankle 2022: No evidence of osteomyelitis on today's exam.    Interval History:   10/05/2022: Patient seen in clinic today for follow-up to left medial ankle wound. He has had wound vac in place and wife and been performing changes at home. He has increase in swelling and erythema. He states he has been on his feet with work the past several days. Denies any fever or chills. Reports some increase in pain after being on his feet for several hours at work.     10/19/2022: Patient seen in clinic today for follow-up to left medial ankle ulcer. Wound unchanged, continues with yellow slough. Denies any fever or chills. He reports adding meghan to diet and increasing protein intake to help promote wound healing. Swelling appears to be controlled. No new issues or concerns reported.      10/26/2022: Patient seen in clinic today for follow-up to left medial ankle ulcer. Wound stable, there is slight decrease in depth. Continues with bioburden coverage. He is tolerating current wound treatment. Denies any fever or chills. He states he is consuming protein supplements and meghan. No new issues or concerns reported.  ---------------------------------------------------------------------------------------------------------------------   Review of Systems   Constitutional: Negative for chills and fever.   HENT: Negative for congestion and rhinorrhea.    Respiratory: Negative for cough and shortness of breath.    Cardiovascular: Positive for leg swelling. Negative for chest pain.        Intermittent    Gastrointestinal: Negative for nausea and vomiting.   Musculoskeletal: Negative for back pain and gait problem.   Skin: Positive for wound.   Hematological: Does not bruise/bleed easily.      ---------------------------------------------------------------------------------------------------------------------   Past Medical History:   Diagnosis Date   • Arthritis    • Hypertension    • Kidney stone    • Sleep apnea      Past Surgical History:   Procedure Laterality Date   • ABDOMINAL SURGERY     • CYSTOLITHALOPAXY PERCUTANEOUS N/A 10/10/2022    Procedure: CYSTOLITHOLAPAXY WITH LASER;  Surgeon: Merrick Gutierrez MD;  Location: [facility];  Service: Urology;  Laterality: N/A;   • CYSTOSCOPY, URETEROSCOPY, RETROGRADE PYELOGRAM, STENT INSERTION Left 10/10/2022    Procedure: URETEROSCOPY RETROGRADE PYELOGRAM HOLMIUM LASER STENT INSERTION;  Surgeon: Merrick Gutierrez MD;  Location: [facility];  Service: Urology;  Laterality: Left;   • FRACTURE SURGERY     • GALLBLADDER SURGERY      15 or more years ago   • KIDNEY STONE SURGERY     • LEG SURGERY Left 2002    has dori in left leg     Family History   Problem Relation Age of Onset   • Hypertension Sister      Social History     Socioeconomic History   •  Marital status:    Tobacco Use   • Smoking status: Never   • Smokeless tobacco: Never   Vaping Use   • Vaping Use: Never used   Substance and Sexual Activity   • Alcohol use: Never   • Drug use: Never   • Sexual activity: Defer     Partners: Female     ---------------------------------------------------------------------------------------------------------------------   Allergies:  Sulfa antibiotics  ---------------------------------------------------------------------------------------------------------------------  Objective     ---------------------------------------------------------------------------------------------------------------------   Vital Signs:  Temp:  [98.9 °F (37.2 °C)] 98.9 °F (37.2 °C)  Heart Rate:  [79] 79  Resp:  [18] 18  BP: (140)/(86) 140/86  No data found.  There were no vitals filed for this visit.  There is no height or weight on file to calculate BMI.  Wt Readings from Last 3 Encounters:   10/10/22 78.2 kg (172 lb 6.4 oz)   10/07/22 79.4 kg (175 lb)   09/28/22 79.4 kg (175 lb)       ---------------------------------------------------------------------------------------------------------------------   Physical Exam  Constitutional: Vital sign were reviewed (temperature, pulse, respiration, and blood pressure) and found to be within expected limits per nursing, general appearance was assessed and the patient was found to be in no distress and calm and comfortable appears  Skin: Temperature:normal turgor and temperatureColor: normal, no cyanosis, jaundice, pallor or bruising, Moisture: dry,Nails: thickened yellow toenails bed, Hair:thinning to lower extremities .  Physical Exam  Wound Assessment:  Location: Left, medial, ankle  Dressing Appearance: dressingappearance: clean  Closure: NA  Changes since last exam: depth has decreased    Etiology and classification: non-pressure chronic ulcer   Wound bed structures/characteristics: full-thickness (subcutaneous tissue is exposed in at  least a portion of the wound), fascia is exposed   Edges moist  Periwound characteristics: moist  Periwound Temperature: normal turgor and temperature  Drainage characteristics: moderate, serous   Perfusion characteristics: Pulses palpable, capillary refill adequate    Wound Goal (s):Closure, Epithelization, Free of infection and No further symptoms  Assessment & Plan      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   - wound vac on hold at this time   -honeygel to base and secure with kerlix and ACE to assist with swelling.  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  Time out was called prior to procedure.   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 Fascia was removed by surgical excision  Instrument(s) used: Curette 4mm  Anesthesia:After checking patient allergies,lidocaine topical 2% was administered to provide anesthesia. and 2% Injectable lidocaine was used  Tissue removed: fascia, Percent Removed 80%  Culture or Biopsy: culture and sensitivity  Estimated Blood Loss: Moderate  Hemostasis Obtained: pressure and Surgicel     Clinical Impression:Moderate Complexity    Follow-up: 1 week    MICKEY Brasher   [facility]  10/26/22  08:36 EDT

## 2022-10-31 NOTE — ADDENDUM NOTE
Encounter addended by: Caorn Weinberg APRN on: 10/30/2022 10:33 PM   Actions taken: Clinical Note Signed

## 2022-11-02 ENCOUNTER — HOSPITAL ENCOUNTER (OUTPATIENT)
Dept: WOUND CARE | Facility: HOSPITAL | Age: 67
Discharge: HOME | End: 2022-11-02
Admitting: NURSE PRACTITIONER

## 2022-11-02 VITALS
HEART RATE: 82 BPM | DIASTOLIC BLOOD PRESSURE: 81 MMHG | RESPIRATION RATE: 18 BRPM | SYSTOLIC BLOOD PRESSURE: 137 MMHG | TEMPERATURE: 99.4 F

## 2022-11-02 LAB
ALBUMIN SERPL-MCNC: 4.09 G/DL (ref 3.5–5.2)
ALBUMIN/GLOB SERPL: 1.6 G/DL
ALP SERPL-CCNC: 67 U/L (ref 39–117)
ALT SERPL W P-5'-P-CCNC: 16 U/L (ref 1–41)
ANION GAP SERPL CALCULATED.3IONS-SCNC: 11.9 MMOL/L (ref 5–15)
AST SERPL-CCNC: 20 U/L (ref 1–40)
BASOPHILS # BLD AUTO: 0.04 10*3/MM3 (ref 0–0.2)
BASOPHILS NFR BLD AUTO: 0.3 % (ref 0–1.5)
BILIRUB SERPL-MCNC: 0.5 MG/DL (ref 0–1.2)
BUN SERPL-MCNC: 24 MG/DL (ref 8–23)
BUN/CREAT SERPL: 32.9 (ref 7–25)
CALCIUM SPEC-SCNC: 9.3 MG/DL (ref 8.6–10.5)
CHLORIDE SERPL-SCNC: 109 MMOL/L (ref 98–107)
CO2 SERPL-SCNC: 22.1 MMOL/L (ref 22–29)
CREAT SERPL-MCNC: 0.73 MG/DL (ref 0.76–1.27)
CRP SERPL-MCNC: <0.3 MG/DL (ref 0–0.5)
DEPRECATED RDW RBC AUTO: 44.1 FL (ref 37–54)
EGFRCR SERPLBLD CKD-EPI 2021: 101 ML/MIN/1.73
EOSINOPHIL # BLD AUTO: 0.28 10*3/MM3 (ref 0–0.4)
EOSINOPHIL NFR BLD AUTO: 1.9 % (ref 0.3–6.2)
ERYTHROCYTE [DISTWIDTH] IN BLOOD BY AUTOMATED COUNT: 13.2 % (ref 12.3–15.4)
ERYTHROCYTE [SEDIMENTATION RATE] IN BLOOD: 7 MM/HR (ref 0–20)
GLOBULIN UR ELPH-MCNC: 2.6 GM/DL
GLUCOSE SERPL-MCNC: 98 MG/DL (ref 65–99)
HCT VFR BLD AUTO: 43 % (ref 37.5–51)
HGB BLD-MCNC: 14.2 G/DL (ref 13–17.7)
IMM GRANULOCYTES # BLD AUTO: 0.05 10*3/MM3 (ref 0–0.05)
IMM GRANULOCYTES NFR BLD AUTO: 0.3 % (ref 0–0.5)
LYMPHOCYTES # BLD AUTO: 1.42 10*3/MM3 (ref 0.7–3.1)
LYMPHOCYTES NFR BLD AUTO: 9.6 % (ref 19.6–45.3)
MCH RBC QN AUTO: 30.4 PG (ref 26.6–33)
MCHC RBC AUTO-ENTMCNC: 33 G/DL (ref 31.5–35.7)
MCV RBC AUTO: 92.1 FL (ref 79–97)
MONOCYTES # BLD AUTO: 0.93 10*3/MM3 (ref 0.1–0.9)
MONOCYTES NFR BLD AUTO: 6.3 % (ref 5–12)
NEUTROPHILS NFR BLD AUTO: 12.11 10*3/MM3 (ref 1.7–7)
NEUTROPHILS NFR BLD AUTO: 81.6 % (ref 42.7–76)
NRBC BLD AUTO-RTO: 0 /100 WBC (ref 0–0.2)
PLATELET # BLD AUTO: 168 10*3/MM3 (ref 140–450)
PMV BLD AUTO: 9.9 FL (ref 6–12)
POTASSIUM SERPL-SCNC: 4.1 MMOL/L (ref 3.5–5.2)
PREALB SERPL-MCNC: 36.5 MG/DL (ref 20–40)
PROT SERPL-MCNC: 6.7 G/DL (ref 6–8.5)
RBC # BLD AUTO: 4.67 10*6/MM3 (ref 4.14–5.8)
SODIUM SERPL-SCNC: 143 MMOL/L (ref 136–145)
WBC NRBC COR # BLD: 14.83 10*3/MM3 (ref 3.4–10.8)

## 2022-11-02 PROCEDURE — 36415 COLL VENOUS BLD VENIPUNCTURE: CPT

## 2022-11-02 PROCEDURE — 85025 COMPLETE CBC W/AUTO DIFF WBC: CPT | Performed by: NURSE PRACTITIONER

## 2022-11-02 PROCEDURE — 84134 ASSAY OF PREALBUMIN: CPT | Performed by: NURSE PRACTITIONER

## 2022-11-02 PROCEDURE — 86140 C-REACTIVE PROTEIN: CPT | Performed by: NURSE PRACTITIONER

## 2022-11-02 PROCEDURE — 85652 RBC SED RATE AUTOMATED: CPT | Performed by: NURSE PRACTITIONER

## 2022-11-02 PROCEDURE — 80053 COMPREHEN METABOLIC PANEL: CPT | Performed by: NURSE PRACTITIONER

## 2022-11-02 RX ORDER — LIDOCAINE HYDROCHLORIDE 20 MG/ML
JELLY TOPICAL AS NEEDED
Status: DISCONTINUED | OUTPATIENT
Start: 2022-11-02 | End: 2022-11-03 | Stop reason: HOSPADM

## 2022-11-02 RX ADMIN — LIDOCAINE HYDROCHLORIDE: 20 JELLY TOPICAL at 09:36

## 2022-11-02 NOTE — ADDENDUM NOTE
Encounter addended by: Caron Weinberg APRN on: 11/2/2022 9:37 AM   Actions taken: Clinical Note Signed

## 2022-11-02 NOTE — ADDENDUM NOTE
Encounter addended by: Lashanda Antonio RN on: 11/2/2022 9:39 AM   Actions taken: MAR administration accepted

## 2022-11-02 NOTE — PROGRESS NOTES
Wound Clinic Note  Patient Identification:  Name:  Broderick Pereira  Age:  67 y.o.  Sex:  male  :  1955  MRN:  [id number]   Visit Number:  [id number]  Primary Care Physician:  Allyson Fox APRN     Subjective     Chief complaint:     Left ankle wound    History of presenting illness:     Patient is a 67 y.o. male with past medical history significant for  that presented today for evaluation of left medial ankle. Reports wound has been present for several weeks 3-4 approximately. Unsure of how the area occurred just noticed the area. Denies any trauma to the area that he can recall. He has been applying triple antibiotic to the area. He denies history of diabetes. Is non-smoker. He is currently on Doxycycline started 2022 prescribed by PCP. Denies any prior vascular disease. He has some imaging completed ordered by PCP: Arterial US 2022: Unremarkable exam with no occlusive segments or segments of high-grade. Venous US: No DVT in the left lower extremity on today's exam.  X-ray foot 2022: Soft tissue defect in the posterior subcutaneous tissues at the level of the distal tibia, but no erosion of bone. X-ray ankle 2022: No evidence of osteomyelitis on today's exam.    Interval History:   10/05/2022: Patient seen in clinic today for follow-up to left medial ankle wound. He has had wound vac in place and wife and been performing changes at home. He has increase in swelling and erythema. He states he has been on his feet with work the past several days. Denies any fever or chills. Reports some increase in pain after being on his feet for several hours at work.     10/19/2022: Patient seen in clinic today for follow-up to left medial ankle ulcer. Wound unchanged, continues with yellow slough. Denies any fever or chills. He reports adding meghan to diet and increasing protein intake to help promote wound healing. Swelling appears to be controlled. No new issues or concerns reported.      10/26/2022: Patient seen in clinic today for follow-up to left medial ankle ulcer. Wound stable, there is slight decrease in depth. Continues with bioburden coverage. He is tolerating current wound treatment. Denies any fever or chills. He states he is consuming protein supplements and meghan. No new issues or concerns reported.    11/02/2022:Patient seen in clinic today for follow-up to left medial ankle ulcer. Wound stable, depth has slightly increased. Continues with bioburden coverage. He is tolerating current wound treatment.  He left compression wraps in place, there was reported odor upon removal of dressing. Denies any fever or chills. He states he is consuming protein supplements and meghan. No new issues or concerns reported.  ---------------------------------------------------------------------------------------------------------------------   Review of Systems   Constitutional: Negative for chills and fever.   HENT: Negative for congestion and rhinorrhea.    Respiratory: Negative for cough and shortness of breath.    Cardiovascular: Positive for leg swelling. Negative for chest pain.        Intermittent    Gastrointestinal: Negative for nausea and vomiting.   Musculoskeletal: Negative for back pain and gait problem.   Skin: Positive for wound.   Hematological: Does not bruise/bleed easily.      ---------------------------------------------------------------------------------------------------------------------   Past Medical History:   Diagnosis Date   • Arthritis    • Hypertension    • Kidney stone    • Sleep apnea      Past Surgical History:   Procedure Laterality Date   • ABDOMINAL SURGERY     • CYSTOLITHALOPAXY PERCUTANEOUS N/A 10/10/2022    Procedure: CYSTOLITHOLAPAXY WITH LASER;  Surgeon: Merrick Gutierrez MD;  Location: [facility];  Service: Urology;  Laterality: N/A;   • CYSTOSCOPY, URETEROSCOPY, RETROGRADE PYELOGRAM, STENT INSERTION Left 10/10/2022    Procedure: URETEROSCOPY RETROGRADE  PYELOGRAM HOLMIUM LASER STENT INSERTION;  Surgeon: Merrick Gutierrez MD;  Location[facility];  Service: Urology;  Laterality: Left;   • FRACTURE SURGERY     • GALLBLADDER SURGERY      15 or more years ago   • KIDNEY STONE SURGERY     • LEG SURGERY Left 2002    has dori in left leg     Family History   Problem Relation Age of Onset   • Hypertension Sister      Social History     Socioeconomic History   • Marital status:    Tobacco Use   • Smoking status: Never   • Smokeless tobacco: Never   Vaping Use   • Vaping Use: Never used   Substance and Sexual Activity   • Alcohol use: Never   • Drug use: Never   • Sexual activity: Defer     Partners: Female     ---------------------------------------------------------------------------------------------------------------------   Allergies:  Sulfa antibiotics  ---------------------------------------------------------------------------------------------------------------------  Objective     ---------------------------------------------------------------------------------------------------------------------   Vital Signs:  Temp:  [99.4 °F (37.4 °C)] 99.4 °F (37.4 °C)  Heart Rate:  [82] 82  Resp:  [18] 18  BP: (137)/(81) 137/81  No data found.  There were no vitals filed for this visit.  There is no height or weight on file to calculate BMI.  Wt Readings from Last 3 Encounters:   10/10/22 78.2 kg (172 lb 6.4 oz)   10/07/22 79.4 kg (175 lb)   09/28/22 79.4 kg (175 lb)       ---------------------------------------------------------------------------------------------------------------------   Physical Exam  Constitutional: Vital sign were reviewed (temperature, pulse, respiration, and blood pressure) and found to be within expected limits per nursing, general appearance was assessed and the patient was found to be in no distress and calm and comfortable appears  Skin: Temperature:normal turgor and temperatureColor: normal, no cyanosis, jaundice, pallor or bruising,  Moisture: dry,Nails: thickened yellow toenails bed, Hair:thinning to lower extremities .  Physical Exam  Wound Assessment:  Location: Left, medial, ankle  Dressing Appearance: dressingappearance: clean  Closure: NA  Changes since last exam: depth has increased    Etiology and classification: non-pressure chronic ulcer   Wound bed structures/characteristics: full-thickness (subcutaneous tissue is exposed in at least a portion of the wound), fascia is exposed   Edges moist  Periwound characteristics: moist  Periwound Temperature: normal turgor and temperature  Drainage characteristics: moderate, serous   Perfusion characteristics: Pulses palpable, capillary refill adequate    Wound Goal (s):Closure, Epithelization, Free of infection and No further symptoms  Assessment & Plan      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   - wound vac on hold at this time   -honeygel to base and secure with kerlix and ACE to assist with swelling.  -Recommend ailyn protein diet 120g/day along with vitamin C 2000mg/day, vitamin A 5000 Units/day, vitamin D3 5000 Units/day, zinc 50mg/day to help promote wound healing   -Labs ordered: CBC, CMP, CRP, sed rate, prealbumin, and hemoglobain A1C to assess inflammatory markers and nutritional status as this both and negatively impact wound healing if not properly treated.       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  Time out was called prior to procedure.   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   Instrument(s) used: Curette 4mm  Anesthesia:After checking patient allergies,lidocaine topical 2% was administered to provide anesthesia. and 2% Injectable lidocaine was used  Tissue removed: subuctaneous, Percent Removed 80%  Culture or Biopsy: culture and sensitivity  Estimated Blood Loss: Moderate  Hemostasis Obtained: pressure and Surgicel     Clinical Impression:Moderate Complexity    Follow-up: 1 week    MICKEY Brasher   [facility]  11/02/22  09:34 EDT

## 2022-11-02 NOTE — ADDENDUM NOTE
Encounter addended by: Lashanda Antonio RN on: 11/2/2022 9:45 AM   Actions taken: Flowsheet accepted

## 2022-11-09 ENCOUNTER — HOSPITAL ENCOUNTER (OUTPATIENT)
Dept: WOUND CARE | Facility: HOSPITAL | Age: 67
Discharge: HOME | End: 2022-11-09

## 2022-11-09 VITALS
HEART RATE: 79 BPM | DIASTOLIC BLOOD PRESSURE: 80 MMHG | RESPIRATION RATE: 18 BRPM | SYSTOLIC BLOOD PRESSURE: 145 MMHG | TEMPERATURE: 99.1 F

## 2022-11-09 RX ORDER — NUTRITIONAL SUPPLEMENT
1 POWDER (GRAM) ORAL 2 TIMES DAILY
Qty: 545 G | Refills: 5 | Status: SHIPPED | OUTPATIENT
Start: 2022-11-09

## 2022-11-09 NOTE — PROGRESS NOTES
Wound Clinic Note  Patient Identification:  Name:  Broderick Pereira  Age:  67 y.o.  Sex:  male  :  1955  MRN:  [id number]   Visit Number:  [id number]  Primary Care Physician:  Allyson Fox APRN     Subjective     Chief complaint:     Left ankle wound    History of presenting illness:     Patient is a 67 y.o. male with past medical history significant for  that presented today for evaluation of left medial ankle. Reports wound has been present for several weeks 3-4 approximately. Unsure of how the area occurred just noticed the area. Denies any trauma to the area that he can recall. He has been applying triple antibiotic to the area. He denies history of diabetes. Is non-smoker. He is currently on Doxycycline started 2022 prescribed by PCP. Denies any prior vascular disease. He has some imaging completed ordered by PCP: Arterial US 2022: Unremarkable exam with no occlusive segments or segments of high-grade. Venous US: No DVT in the left lower extremity on today's exam.  X-ray foot 2022: Soft tissue defect in the posterior subcutaneous tissues at the level of the distal tibia, but no erosion of bone. X-ray ankle 2022: No evidence of osteomyelitis on today's exam.    Interval History:   10/05/2022: Patient seen in clinic today for follow-up to left medial ankle wound. He has had wound vac in place and wife and been performing changes at home. He has increase in swelling and erythema. He states he has been on his feet with work the past several days. Denies any fever or chills. Reports some increase in pain after being on his feet for several hours at work.     10/19/2022: Patient seen in clinic today for follow-up to left medial ankle ulcer. Wound unchanged, continues with yellow slough. Denies any fever or chills. He reports adding meghan to diet and increasing protein intake to help promote wound healing. Swelling appears to be controlled. No new issues or concerns reported.      10/26/2022: Patient seen in clinic today for follow-up to left medial ankle ulcer. Wound stable, there is slight decrease in depth. Continues with bioburden coverage. He is tolerating current wound treatment. Denies any fever or chills. He states he is consuming protein supplements and meghan. No new issues or concerns reported.    11/02/2022:Patient seen in clinic today for follow-up to left medial ankle ulcer. Wound stable, depth has slightly increased. Continues with bioburden coverage. He is tolerating current wound treatment.  He left compression wraps in place, there was reported odor upon removal of dressing. Denies any fever or chills. He states he is consuming protein supplements and meghan. No new issues or concerns reported.    11/09/2022: Patient seen in clinic today for follow-up to left medial ankle ulcer. Wound now with hypergranulation to the distal portion. Minimal pain reported. Denies any fever or chills. He had 2 layer compression wrap applied and had left in place for 4 days before having to remove. No new issues or concerns reported. Labs reviewed: Glucose 98, albumin 4.09, prealbumin 36.5, CRP < 0.30, WBC 14.83, sed rate 7.   ---------------------------------------------------------------------------------------------------------------------   Review of Systems   Constitutional: Negative for chills and fever.   HENT: Negative for congestion and rhinorrhea.    Respiratory: Negative for cough and shortness of breath.    Cardiovascular: Positive for leg swelling. Negative for chest pain.        Intermittent    Gastrointestinal: Negative for nausea and vomiting.   Musculoskeletal: Negative for back pain and gait problem.   Skin: Positive for wound.   Hematological: Does not bruise/bleed easily.      ---------------------------------------------------------------------------------------------------------------------   Past Medical History:   Diagnosis Date   • Arthritis    • Hypertension    •  Kidney stone    • Sleep apnea      Past Surgical History:   Procedure Laterality Date   • ABDOMINAL SURGERY     • CYSTOLITHALOPAXY PERCUTANEOUS N/A 10/10/2022    Procedure: CYSTOLITHOLAPAXY WITH LASER;  Surgeon: Merrick Gutierrez MD;  Location: [facility];  Service: Urology;  Laterality: N/A;   • CYSTOSCOPY, URETEROSCOPY, RETROGRADE PYELOGRAM, STENT INSERTION Left 10/10/2022    Procedure: URETEROSCOPY RETROGRADE PYELOGRAM HOLMIUM LASER STENT INSERTION;  Surgeon: Merrick Gutierrez MD;  Location: [facility];  Service: Urology;  Laterality: Left;   • FRACTURE SURGERY     • GALLBLADDER SURGERY      15 or more years ago   • KIDNEY STONE SURGERY     • LEG SURGERY Left 2002    has dori in left leg     Family History   Problem Relation Age of Onset   • Hypertension Sister      Social History     Socioeconomic History   • Marital status:    Tobacco Use   • Smoking status: Never   • Smokeless tobacco: Never   Vaping Use   • Vaping Use: Never used   Substance and Sexual Activity   • Alcohol use: Never   • Drug use: Never   • Sexual activity: Defer     Partners: Female     ---------------------------------------------------------------------------------------------------------------------   Allergies:  Sulfa antibiotics  ---------------------------------------------------------------------------------------------------------------------  Objective     ---------------------------------------------------------------------------------------------------------------------   Vital Signs:  Temp:  [99.1 °F (37.3 °C)] 99.1 °F (37.3 °C)  Heart Rate:  [79] 79  Resp:  [18] 18  BP: (145)/(80) 145/80  No data found.  There were no vitals filed for this visit.  There is no height or weight on file to calculate BMI.  Wt Readings from Last 3 Encounters:   10/10/22 78.2 kg (172 lb 6.4 oz)   10/07/22 79.4 kg (175 lb)   09/28/22 79.4 kg (175 lb)        ---------------------------------------------------------------------------------------------------------------------   Physical Exam  Constitutional: Vital sign were reviewed (temperature, pulse, respiration, and blood pressure) and found to be within expected limits per nursing, general appearance was assessed and the patient was found to be in no distress and calm and comfortable appears  Skin: Temperature:normal turgor and temperatureColor: normal, no cyanosis, jaundice, pallor or bruising, Moisture: dry,Nails: thickened yellow toenails bed, Hair:thinning to lower extremities .  Physical Exam  Wound Assessment:  Location: Left, medial, ankle  Dressing Appearance: dressingappearance: clean  Closure: NA  Changes since last exam: no changes   Etiology and classification: non-pressure chronic ulcer   Wound bed structures/characteristics: full-thickness (subcutaneous tissue is exposed in at least a portion of the wound), fascia is exposed , Hypergranulation present  Edges moist  Periwound characteristics: moist  Periwound Temperature: normal turgor and temperature  Drainage characteristics: moderate, serous   Perfusion characteristics: Pulses palpable, capillary refill adequate    Wound Goal (s):Closure, Epithelization, Free of infection and No further symptoms  Assessment & Plan      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  -Chemical cauterization completed, see below for procedure details.   - Labs reviewed 11/09/2022: Glucose 98, albumin 4.09, prealbumin 36.5, CRP < 0.30, WBC 14.83, sed rate 7.   -White count elevated, discussed with patient, wound does not appear to be acutely infected. Drainage minimal no odor, no surrounding evidence of cellulites.   -Recommend ailyn protein diet 120g/day along with vitamin C 2000mg/day, vitamin A 5000 Units/day, vitamin D3 5000  Units/day, zinc 50mg/day to help promote wound healing   -Will repeat labs next week to assess WBC as it was elevated.    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 The wound measures 2.5cm X 1.5cm X 0.7cm. mild. There is a Small amount of serous. The ulcer base is described as hypergranulated. There is no necrotic devitalized tissue present in the ulcer bed, Silver nitrate was used to cauterize the area of hypergranulation, carefully sparing the epithelial edge. I explained to the patient that the area would become temporarily discolored, usually grey or black. A dressing was placed over the area. The patient tolerated the procedure well, not showing signs or symptoms of infection.     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 The preservice record has been thoroughly documented and reviewed to ensure the circumstances resulting in wound failure were addressed appropriately and with the required interventions. The patient has been compliant with all recommendations of their comprehensive management to include control of underlying conditions that contributed to the ulcer, appropriate off-loading, and an optimized nutritional plan. Nutritional assessment and planning is based on labs of ALB 3.99, PREALB 18.3.  Adequate circulation/oxygenation to support tissue growth evidenced by an THOM of Right: 1.32, Left: 1.32  Patient's HbA1 NA not diabetic. There is no evidence of underlying osteomyelitis or cellulitis, and any potential  nidus of infection or bacterial colonization has been controlled. The ulcer is clean and free of necrotic debris and exudate with a granular base. Ulcer is full (or partial) skin loss , not involving tendon, muscle, joint capsule, or exhibiting bone or sinus tracts with a clean granular bed. At this time, placement of a cellular and/or tissue-based therapy is recommended, because its dermal matrix should provide the ideal environment to support cellular repopulation. The patient is a non-smoker or has refrained from systemic tobacco intake for at least 4 weeks during conservative wound care and prior to this procedure. Appropriate compression bandaging has been provided with diligent use of multilayer bandages, stockings (>20 mmHg) or velcro devices The addition of Skin Substitutes (Puraply, Apligraf) is reasonable and necessary for the treatment of this non healing ulceration, and may afford a healing advantage over dressings and conservative treatments that have proven insufficient to complete heal this patient.     Clinical Impression:Moderate Complexity    Follow-up: 1 week    MICKEY Brasher   [facility]  11/09/22  09:47 EST

## 2022-11-16 ENCOUNTER — HOSPITAL ENCOUNTER (OUTPATIENT)
Dept: ULTRASOUND IMAGING | Facility: HOSPITAL | Age: 67
Discharge: HOME | End: 2022-11-16
Admitting: UROLOGY

## 2022-11-16 ENCOUNTER — HOSPITAL ENCOUNTER (OUTPATIENT)
Dept: WOUND CARE | Facility: HOSPITAL | Age: 67
Discharge: HOME | End: 2022-11-16
Admitting: NURSE PRACTITIONER

## 2022-11-16 VITALS
DIASTOLIC BLOOD PRESSURE: 73 MMHG | RESPIRATION RATE: 18 BRPM | HEART RATE: 81 BPM | SYSTOLIC BLOOD PRESSURE: 154 MMHG | TEMPERATURE: 98.3 F

## 2022-11-16 DIAGNOSIS — N20.1 LEFT URETERAL STONE: ICD-10-CM

## 2022-11-16 PROCEDURE — 76775 US EXAM ABDO BACK WALL LIM: CPT | Performed by: RADIOLOGY

## 2022-11-16 PROCEDURE — 76775 US EXAM ABDO BACK WALL LIM: CPT

## 2022-11-16 RX ORDER — LIDOCAINE HYDROCHLORIDE 20 MG/ML
JELLY TOPICAL ONCE
Status: COMPLETED | OUTPATIENT
Start: 2022-11-16 | End: 2022-11-16

## 2022-11-16 RX ADMIN — LIDOCAINE HYDROCHLORIDE: 20 JELLY TOPICAL at 09:54

## 2022-11-16 NOTE — PROGRESS NOTES
Wound Clinic Note  Patient Identification:  Name:  Broderick Pereira  Age:  67 y.o.  Sex:  male  :  1955  MRN:  [id number]   Visit Number:  [id number]  Primary Care Physician:  Allyson Fox APRN     Subjective     Chief complaint:     Left ankle wound    History of presenting illness:     Patient is a 67 y.o. male with past medical history significant for  that presented today for evaluation of left medial ankle. Reports wound has been present for several weeks 3-4 approximately. Unsure of how the area occurred just noticed the area. Denies any trauma to the area that he can recall. He has been applying triple antibiotic to the area. He denies history of diabetes. Is non-smoker. He is currently on Doxycycline started 2022 prescribed by PCP. Denies any prior vascular disease. He has some imaging completed ordered by PCP: Arterial US 2022: Unremarkable exam with no occlusive segments or segments of high-grade. Venous US: No DVT in the left lower extremity on today's exam.  X-ray foot 2022: Soft tissue defect in the posterior subcutaneous tissues at the level of the distal tibia, but no erosion of bone. X-ray ankle 2022: No evidence of osteomyelitis on today's exam.    Interval History:   10/05/2022: Patient seen in clinic today for follow-up to left medial ankle wound. He has had wound vac in place and wife and been performing changes at home. He has increase in swelling and erythema. He states he has been on his feet with work the past several days. Denies any fever or chills. Reports some increase in pain after being on his feet for several hours at work.     10/19/2022: Patient seen in clinic today for follow-up to left medial ankle ulcer. Wound unchanged, continues with yellow slough. Denies any fever or chills. He reports adding meghan to diet and increasing protein intake to help promote wound healing. Swelling appears to be controlled. No new issues or concerns reported.      10/26/2022: Patient seen in clinic today for follow-up to left medial ankle ulcer. Wound stable, there is slight decrease in depth. Continues with bioburden coverage. He is tolerating current wound treatment. Denies any fever or chills. He states he is consuming protein supplements and meghan. No new issues or concerns reported.    11/02/2022:Patient seen in clinic today for follow-up to left medial ankle ulcer. Wound stable, depth has slightly increased. Continues with bioburden coverage. He is tolerating current wound treatment.  He left compression wraps in place, there was reported odor upon removal of dressing. Denies any fever or chills. He states he is consuming protein supplements and meghan. No new issues or concerns reported.    11/09/2022: Patient seen in clinic today for follow-up to left medial ankle ulcer. Wound now with hypergranulation to the distal portion. Minimal pain reported. Denies any fever or chills. He had 2 layer compression wrap applied and had left in place for 4 days before having to remove. No new issues or concerns reported. Labs reviewed: Glucose 98, albumin 4.09, prealbumin 36.5, CRP < 0.30, WBC 14.83, sed rate 7.     11/16/2022: Patient seen in clinic today for follow-up to left medial ankle ulcer. Wound decrease in depth. There continues to be hypergranulation to distal portion.  Denies any fever or chills. He continues with meghan and high protein intake.. Swelling appears to be controlled. No new issues or concerns reported.  Tolerating current treatment with compression wraps.   ---------------------------------------------------------------------------------------------------------------------   Review of Systems   Constitutional: Negative for chills and fever.   HENT: Negative for congestion and rhinorrhea.    Respiratory: Negative for cough and shortness of breath.    Cardiovascular: Positive for leg swelling. Negative for chest pain.        Intermittent    Gastrointestinal:  Negative for nausea and vomiting.   Musculoskeletal: Negative for back pain and gait problem.   Skin: Positive for wound.   Hematological: Does not bruise/bleed easily.      ---------------------------------------------------------------------------------------------------------------------   Past Medical History:   Diagnosis Date   • Arthritis    • Hypertension    • Kidney stone    • Sleep apnea      Past Surgical History:   Procedure Laterality Date   • ABDOMINAL SURGERY     • CYSTOLITHALOPAXY PERCUTANEOUS N/A 10/10/2022    Procedure: CYSTOLITHOLAPAXY WITH LASER;  Surgeon: Merrick Gutierrez MD;  Location: [facility];  Service: Urology;  Laterality: N/A;   • CYSTOSCOPY, URETEROSCOPY, RETROGRADE PYELOGRAM, STENT INSERTION Left 10/10/2022    Procedure: URETEROSCOPY RETROGRADE PYELOGRAM HOLMIUM LASER STENT INSERTION;  Surgeon: Merrick Gutierrez MD;  Location: [facility];  Service: Urology;  Laterality: Left;   • FRACTURE SURGERY     • GALLBLADDER SURGERY      15 or more years ago   • KIDNEY STONE SURGERY     • LEG SURGERY Left 2002    has dori in left leg     Family History   Problem Relation Age of Onset   • Hypertension Sister      Social History     Socioeconomic History   • Marital status:    Tobacco Use   • Smoking status: Never   • Smokeless tobacco: Never   Vaping Use   • Vaping Use: Never used   Substance and Sexual Activity   • Alcohol use: Never   • Drug use: Never   • Sexual activity: Defer     Partners: Female     ---------------------------------------------------------------------------------------------------------------------   Allergies:  Sulfa antibiotics  ---------------------------------------------------------------------------------------------------------------------  Objective     ---------------------------------------------------------------------------------------------------------------------   Vital Signs:  Temp:  [98.3 °F (36.8 °C)] 98.3 °F (36.8 °C)  Heart Rate:  [81]  81  Resp:  [18] 18  BP: (154)/(73) 154/73  No data found.  There were no vitals filed for this visit.  There is no height or weight on file to calculate BMI.  Wt Readings from Last 3 Encounters:   10/10/22 78.2 kg (172 lb 6.4 oz)   10/07/22 79.4 kg (175 lb)   09/28/22 79.4 kg (175 lb)       ---------------------------------------------------------------------------------------------------------------------   Physical Exam  Constitutional: Vital sign were reviewed (temperature, pulse, respiration, and blood pressure) and found to be within expected limits per nursing, general appearance was assessed and the patient was found to be in no distress and calm and comfortable appears  Skin: Temperature:normal turgor and temperatureColor: normal, no cyanosis, jaundice, pallor or bruising, Moisture: dry,Nails: thickened yellow toenails bed, Hair:thinning to lower extremities .  Physical Exam  Wound Assessment:  Location: Left, medial, ankle  Dressing Appearance: dressingappearance: clean  Closure: NA  Changes since last exam: no changes   Etiology and classification: non-pressure chronic ulcer   Wound bed structures/characteristics: full-thickness (subcutaneous tissue is exposed in at least a portion of the wound), Hypergranulation present  Edges moist  Periwound characteristics: moist  Periwound Temperature: normal turgor and temperature  Drainage characteristics: moderate, serous   Perfusion characteristics: Pulses palpable, capillary refill adequate    Wound Goal (s):Closure, Epithelization, Free of infection and No further symptoms  Assessment & Plan      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  -honeygel to base and secure with kerlix and 2 layer compression   -Chemical cauterization completed, see below for procedure details.   - Labs reviewed 11/09/2022: Glucose 98, albumin 4.09, prealbumin  36.5, CRP < 0.30, WBC 14.83, sed rate 7.   -Recommend ailyn protein diet 120g/day along with vitamin C 2000mg/day, vitamin A 5000 Units/day, vitamin D3 5000 Units/day, zinc 50mg/day to help promote wound healing       Multilayer Compression (MLC)-  Today's procedure is the application of a multilayer compression bandage. I have informed the patient of the risks and benefits of this procedure and they have had the opportunity to ask questions. Procedure was performed in a clean field. Multi-layer compression bandage was applied per 's instructions. Patient tolerated procedure well and without complaints of pain or discomfort. Patient was educated regarding signs and symptoms of over compression, including unrelieved pain, toes turning red or purple, or numbness in foot. Patient was instructed to manually remove outer layer (without scissors) and contact clinic immediately , applied to left lower extremity.     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  Time out was called prior to procedure.   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   Instrument(s) used: Curette 4mm  Anesthesia:After checking patient allergies,lidocaine topical 2% was administered to provide anesthesia. 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 The preservice record has been thoroughly documented and reviewed to ensure the circumstances resulting in wound failure were addressed appropriately and with the required interventions. The patient has been compliant with all recommendations of their comprehensive management to include control of underlying conditions that contributed to the ulcer, appropriate off-loading, and an optimized nutritional plan. Nutritional assessment and planning is based on labs of ALB 3.99, PREALB 18.3.  Adequate circulation/oxygenation to support tissue growth evidenced by an THOM of Right: 1.32, Left: 1.32  Patient's HbA1 NA not diabetic. There is no evidence of underlying osteomyelitis or cellulitis, and any potential nidus of infection or bacterial colonization has been controlled. The ulcer is clean and free of necrotic debris and exudate with a granular base. Ulcer is full (or partial) skin loss , not involving tendon, muscle, joint capsule, or  exhibiting bone or sinus tracts with a clean granular bed. At this time, placement of a cellular and/or tissue-based therapy is recommended, because its dermal matrix should provide the ideal environment to support cellular repopulation. The patient is a non-smoker or has refrained from systemic tobacco intake for at least 4 weeks during conservative wound care and prior to this procedure. Appropriate compression bandaging has been provided with diligent use of multilayer bandages, stockings (>20 mmHg) or velcro devices The addition of Skin Substitutes (Puraply, Apligraf) is reasonable and necessary for the treatment of this non healing ulceration, and may afford a healing advantage over dressings and conservative treatments that have proven insufficient to complete heal this patient.     Clinical Impression:Moderate Complexity    Follow-up: 1 week    MICKEY Brasher   [facility]  11/16/22  09:56 EST

## 2022-11-23 ENCOUNTER — HOSPITAL ENCOUNTER (OUTPATIENT)
Dept: WOUND CARE | Facility: HOSPITAL | Age: 67
Discharge: HOME | End: 2022-11-23
Admitting: NURSE PRACTITIONER

## 2022-11-23 VITALS
SYSTOLIC BLOOD PRESSURE: 166 MMHG | DIASTOLIC BLOOD PRESSURE: 80 MMHG | RESPIRATION RATE: 17 BRPM | HEART RATE: 78 BPM | TEMPERATURE: 98.2 F

## 2022-11-23 PROCEDURE — 97602 WOUND(S) CARE NON-SELECTIVE: CPT

## 2022-11-23 RX ORDER — LIDOCAINE HYDROCHLORIDE 20 MG/ML
JELLY TOPICAL ONCE
Status: DISCONTINUED | OUTPATIENT
Start: 2022-11-23 | End: 2022-11-24 | Stop reason: HOSPADM

## 2022-11-23 NOTE — PROGRESS NOTES
Wound Clinic Note  Patient Identification:  Name:  Broderick Pereira  Age:  67 y.o.  Sex:  male  :  1955  MRN:  [id number]   Visit Number:  [id number]  Primary Care Physician:  Allyson Fox APRN     Subjective     Chief complaint:     Left ankle wound    History of presenting illness:     Patient is a 67 y.o. male with past medical history significant for  that presented today for evaluation of left medial ankle. Reports wound has been present for several weeks 3-4 approximately. Unsure of how the area occurred just noticed the area. Denies any trauma to the area that he can recall. He has been applying triple antibiotic to the area. He denies history of diabetes. Is non-smoker. He is currently on Doxycycline started 2022 prescribed by PCP. Denies any prior vascular disease. He has some imaging completed ordered by PCP: Arterial US 2022: Unremarkable exam with no occlusive segments or segments of high-grade. Venous US: No DVT in the left lower extremity on today's exam.  X-ray foot 2022: Soft tissue defect in the posterior subcutaneous tissues at the level of the distal tibia, but no erosion of bone. X-ray ankle 2022: No evidence of osteomyelitis on today's exam.    Interval History:   10/05/2022: Patient seen in clinic today for follow-up to left medial ankle wound. He has had wound vac in place and wife and been performing changes at home. He has increase in swelling and erythema. He states he has been on his feet with work the past several days. Denies any fever or chills. Reports some increase in pain after being on his feet for several hours at work.     10/19/2022: Patient seen in clinic today for follow-up to left medial ankle ulcer. Wound unchanged, continues with yellow slough. Denies any fever or chills. He reports adding meghan to diet and increasing protein intake to help promote wound healing. Swelling appears to be controlled. No new issues or concerns reported.      10/26/2022: Patient seen in clinic today for follow-up to left medial ankle ulcer. Wound stable, there is slight decrease in depth. Continues with bioburden coverage. He is tolerating current wound treatment. Denies any fever or chills. He states he is consuming protein supplements and meghan. No new issues or concerns reported.    11/02/2022:Patient seen in clinic today for follow-up to left medial ankle ulcer. Wound stable, depth has slightly increased. Continues with bioburden coverage. He is tolerating current wound treatment.  He left compression wraps in place, there was reported odor upon removal of dressing. Denies any fever or chills. He states he is consuming protein supplements and meghan. No new issues or concerns reported.    11/09/2022: Patient seen in clinic today for follow-up to left medial ankle ulcer. Wound now with hypergranulation to the distal portion. Minimal pain reported. Denies any fever or chills. He had 2 layer compression wrap applied and had left in place for 4 days before having to remove. No new issues or concerns reported. Labs reviewed: Glucose 98, albumin 4.09, prealbumin 36.5, CRP < 0.30, WBC 14.83, sed rate 7.     11/16/2022: Patient seen in clinic today for follow-up to left medial ankle ulcer. Wound decrease in depth. There continues to be hypergranulation to distal portion.  Denies any fever or chills. He continues with meghan and high protein intake.. Swelling appears to be controlled. No new issues or concerns reported.  Tolerating current treatment with compression wraps.     11/23/2022: Patient seen in clinic today for follow-up to left medial ankle ulcer. Wound stable, depth continues to be deep on the proximal wound. Denies any fever or chills. He states wife smelt an order the last few days. No other issues or concerns reported at this time. He was able to wear compression wraps for full week. No concerns to wrap.    ---------------------------------------------------------------------------------------------------------------------   Review of Systems   Constitutional: Negative for chills and fever.   HENT: Negative for congestion and rhinorrhea.    Respiratory: Negative for cough and shortness of breath.    Cardiovascular: Positive for leg swelling. Negative for chest pain.        Intermittent    Gastrointestinal: Negative for nausea and vomiting.   Musculoskeletal: Negative for back pain and gait problem.   Skin: Positive for wound.   Hematological: Does not bruise/bleed easily.      ---------------------------------------------------------------------------------------------------------------------   Past Medical History:   Diagnosis Date   • Arthritis    • Hypertension    • Kidney stone    • Sleep apnea      Past Surgical History:   Procedure Laterality Date   • ABDOMINAL SURGERY     • CYSTOLITHALOPAXY PERCUTANEOUS N/A 10/10/2022    Procedure: CYSTOLITHOLAPAXY WITH LASER;  Surgeon: Merrick Gutierrez MD;  Location: [facility];  Service: Urology;  Laterality: N/A;   • CYSTOSCOPY, URETEROSCOPY, RETROGRADE PYELOGRAM, STENT INSERTION Left 10/10/2022    Procedure: URETEROSCOPY RETROGRADE PYELOGRAM HOLMIUM LASER STENT INSERTION;  Surgeon: Merrick Gutierrez MD;  Location: [facility];  Service: Urology;  Laterality: Left;   • FRACTURE SURGERY     • GALLBLADDER SURGERY      15 or more years ago   • KIDNEY STONE SURGERY     • LEG SURGERY Left 2002    has dori in left leg     Family History   Problem Relation Age of Onset   • Hypertension Sister      Social History     Socioeconomic History   • Marital status:    Tobacco Use   • Smoking status: Never   • Smokeless tobacco: Never   Vaping Use   • Vaping Use: Never used   Substance and Sexual Activity   • Alcohol use: Never   • Drug use: Never   • Sexual activity: Defer     Partners: Female      ---------------------------------------------------------------------------------------------------------------------   Allergies:  Sulfa antibiotics  ---------------------------------------------------------------------------------------------------------------------  Objective     ---------------------------------------------------------------------------------------------------------------------   Vital Signs:  Temp:  [98.2 °F (36.8 °C)] 98.2 °F (36.8 °C)  Heart Rate:  [78] 78  Resp:  [17] 17  BP: (166)/(80) 166/80  No data found.  There were no vitals filed for this visit.  There is no height or weight on file to calculate BMI.  Wt Readings from Last 3 Encounters:   10/10/22 78.2 kg (172 lb 6.4 oz)   10/07/22 79.4 kg (175 lb)   09/28/22 79.4 kg (175 lb)       ---------------------------------------------------------------------------------------------------------------------   Physical Exam  Constitutional: Vital sign were reviewed (temperature, pulse, respiration, and blood pressure) and found to be within expected limits per nursing, general appearance was assessed and the patient was found to be in no distress and calm and comfortable appears  Skin: Temperature:normal turgor and temperatureColor: normal, no cyanosis, jaundice, pallor or bruising, Moisture: dry,Nails: thickened yellow toenails bed, Hair:thinning to lower extremities .  Physical Exam  Wound Assessment:  Location: Left, medial, ankle  Dressing Appearance: dressingappearance: clean  Closure: NA  Changes since last exam: no changes   Etiology and classification: non-pressure chronic ulcer   Wound bed structures/characteristics: full-thickness (subcutaneous tissue is exposed in at least a portion of the wound), Hypergranulation present  Edges moist  Periwound characteristics: moist  Periwound Temperature: normal turgor and temperature  Drainage characteristics: moderate, serous   Perfusion characteristics: Pulses palpable, capillary refill  adequate    Wound Goal (s):Closure, Epithelization, Free of infection and No further symptoms  Assessment & Plan      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  -honeygel to base and secure with kerlix and 2 layer compression   - Labs reviewed 11/09/2022: Glucose 98, albumin 4.09, prealbumin 36.5, CRP < 0.30, WBC 14.83, sed rate 7.   -Recommend ailyn protein diet 120g/day along with vitamin C 2000mg/day, vitamin A 5000 Units/day, vitamin D3 5000 Units/day, zinc 50mg/day to help promote wound healing       Multilayer Compression (MLC)-  Today's procedure is the application of a multilayer compression bandage. I have informed the patient of the risks and benefits of this procedure and they have had the opportunity to ask questions. Procedure was performed in a clean field. Multi-layer compression bandage was applied per 's instructions. Patient tolerated procedure well and without complaints of pain or discomfort. Patient was educated regarding signs and symptoms of over compression, including unrelieved pain, toes turning red or purple, or numbness in foot. Patient was instructed to manually remove outer layer (without scissors) and contact clinic immediately , applied to left lower extremity.     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  Time out was called prior to procedure.   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   Instrument(s) used: Curette 4mm  Anesthesia:After checking patient allergies,lidocaine topical 2% was administered to provide anesthesia. 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 The preservice record has been thoroughly documented and reviewed to ensure the circumstances resulting in wound failure were addressed appropriately and with the required interventions. The patient has been compliant with all recommendations of their comprehensive management to include control of underlying conditions that contributed to the ulcer, appropriate off-loading, and an optimized nutritional plan. Nutritional assessment and planning is based on labs of ALB 3.99, PREALB 18.3.  Adequate circulation/oxygenation to support tissue  growth evidenced by an THOM of Right: 1.32, Left: 1.32  Patient's HbA1 NA not diabetic. There is no evidence of underlying osteomyelitis or cellulitis, and any potential nidus of infection or bacterial colonization has been controlled. The ulcer is clean and free of necrotic debris and exudate with a granular base. Ulcer is full (or partial) skin loss , not involving tendon, muscle, joint capsule, or exhibiting bone or sinus tracts with a clean granular bed. At this time, placement of a cellular and/or tissue-based therapy is recommended, because its dermal matrix should provide the ideal environment to support cellular repopulation. The patient is a non-smoker or has refrained from systemic tobacco intake for at least 4 weeks during conservative wound care and prior to this procedure. Appropriate compression bandaging has been provided with diligent use of multilayer bandages, stockings (>20 mmHg) or velcro devices The addition of Skin Substitutes (Puraply, Apligraf) is reasonable and necessary for the treatment of this non healing ulceration, and may afford a healing advantage over dressings and conservative treatments that have proven insufficient to complete heal this patient.     Clinical Impression:Moderate Complexity    Follow-up: 1 week    MICKEY Brasher   [facility]  11/23/22  09:36 EST

## 2022-11-23 NOTE — ADDENDUM NOTE
Encounter addended by: Jerald Sierra RN on: 11/23/2022 11:19 AM   Actions taken: Flowsheet accepted, Charge Capture section accepted

## 2022-11-30 ENCOUNTER — HOSPITAL ENCOUNTER (OUTPATIENT)
Dept: WOUND CARE | Facility: HOSPITAL | Age: 67
Discharge: HOME | End: 2022-11-30
Admitting: NURSE PRACTITIONER

## 2022-11-30 VITALS
SYSTOLIC BLOOD PRESSURE: 127 MMHG | TEMPERATURE: 98.7 F | DIASTOLIC BLOOD PRESSURE: 79 MMHG | RESPIRATION RATE: 18 BRPM | HEART RATE: 100 BPM

## 2022-11-30 RX ORDER — LIDOCAINE HYDROCHLORIDE 20 MG/ML
JELLY TOPICAL ONCE
Status: COMPLETED | OUTPATIENT
Start: 2022-11-30 | End: 2022-11-30

## 2022-11-30 RX ADMIN — LIDOCAINE HYDROCHLORIDE: 20 JELLY TOPICAL at 08:35

## 2022-11-30 NOTE — PROGRESS NOTES
Wound Clinic Note  Patient Identification:  Name:  Broderick Pereira  Age:  67 y.o.  Sex:  male  :  1955  MRN:  [id number]   Visit Number:  [id number]  Primary Care Physician:  Allyson Fox APRN     Subjective     Chief complaint:     Left ankle wound    History of presenting illness:     Patient is a 67 y.o. male with past medical history significant for  that presented today for evaluation of left medial ankle. Reports wound has been present for several weeks 3-4 approximately. Unsure of how the area occurred just noticed the area. Denies any trauma to the area that he can recall. He has been applying triple antibiotic to the area. He denies history of diabetes. Is non-smoker. He is currently on Doxycycline started 2022 prescribed by PCP. Denies any prior vascular disease. He has some imaging completed ordered by PCP: Arterial US 2022: Unremarkable exam with no occlusive segments or segments of high-grade. Venous US: No DVT in the left lower extremity on today's exam.  X-ray foot 2022: Soft tissue defect in the posterior subcutaneous tissues at the level of the distal tibia, but no erosion of bone. X-ray ankle 2022: No evidence of osteomyelitis on today's exam.    Interval History:   10/05/2022: Patient seen in clinic today for follow-up to left medial ankle wound. He has had wound vac in place and wife and been performing changes at home. He has increase in swelling and erythema. He states he has been on his feet with work the past several days. Denies any fever or chills. Reports some increase in pain after being on his feet for several hours at work.     10/19/2022: Patient seen in clinic today for follow-up to left medial ankle ulcer. Wound unchanged, continues with yellow slough. Denies any fever or chills. He reports adding meghan to diet and increasing protein intake to help promote wound healing. Swelling appears to be controlled. No new issues or concerns reported.      10/26/2022: Patient seen in clinic today for follow-up to left medial ankle ulcer. Wound stable, there is slight decrease in depth. Continues with bioburden coverage. He is tolerating current wound treatment. Denies any fever or chills. He states he is consuming protein supplements and meghan. No new issues or concerns reported.    11/02/2022:Patient seen in clinic today for follow-up to left medial ankle ulcer. Wound stable, depth has slightly increased. Continues with bioburden coverage. He is tolerating current wound treatment.  He left compression wraps in place, there was reported odor upon removal of dressing. Denies any fever or chills. He states he is consuming protein supplements and meghan. No new issues or concerns reported.    11/09/2022: Patient seen in clinic today for follow-up to left medial ankle ulcer. Wound now with hypergranulation to the distal portion. Minimal pain reported. Denies any fever or chills. He had 2 layer compression wrap applied and had left in place for 4 days before having to remove. No new issues or concerns reported. Labs reviewed: Glucose 98, albumin 4.09, prealbumin 36.5, CRP < 0.30, WBC 14.83, sed rate 7.     11/16/2022: Patient seen in clinic today for follow-up to left medial ankle ulcer. Wound decrease in depth. There continues to be hypergranulation to distal portion.  Denies any fever or chills. He continues with meghan and high protein intake.. Swelling appears to be controlled. No new issues or concerns reported.  Tolerating current treatment with compression wraps.     11/23/2022: Patient seen in clinic today for follow-up to left medial ankle ulcer. Wound stable, depth continues to be deep on the proximal wound. Denies any fever or chills. He states wife smelt an order the last few days. No other issues or concerns reported at this time. He was able to wear compression wraps for full week. No concerns to wrap.     11/30/2022: Patient seen in clinic today for  follow-up to left medial ankle ulcer. Wound with hypergranulation to distal wound. Slough remains present. Denies any fever or chills. There is increase in erythema to periwound, this could be from  Increase in drainage versus acute infection. No other issues or concerns reported at this time. He was able to wear compression wraps for full week. No concerns to wrap.   ---------------------------------------------------------------------------------------------------------------------   Review of Systems   Constitutional: Negative for chills and fever.   HENT: Negative for congestion and rhinorrhea.    Respiratory: Negative for cough and shortness of breath.    Cardiovascular: Positive for leg swelling. Negative for chest pain.        Intermittent    Gastrointestinal: Negative for nausea and vomiting.   Musculoskeletal: Negative for back pain and gait problem.   Skin: Positive for wound.   Hematological: Does not bruise/bleed easily.      ---------------------------------------------------------------------------------------------------------------------   Past Medical History:   Diagnosis Date   • Arthritis    • Hypertension    • Kidney stone    • Sleep apnea      Past Surgical History:   Procedure Laterality Date   • ABDOMINAL SURGERY     • CYSTOLITHALOPAXY PERCUTANEOUS N/A 10/10/2022    Procedure: CYSTOLITHOLAPAXY WITH LASER;  Surgeon: Merrick Gutierrez MD;  Location: [facility];  Service: Urology;  Laterality: N/A;   • CYSTOSCOPY, URETEROSCOPY, RETROGRADE PYELOGRAM, STENT INSERTION Left 10/10/2022    Procedure: URETEROSCOPY RETROGRADE PYELOGRAM HOLMIUM LASER STENT INSERTION;  Surgeon: Merrick Gutierrez MD;  Location: [facility];  Service: Urology;  Laterality: Left;   • FRACTURE SURGERY     • GALLBLADDER SURGERY      15 or more years ago   • KIDNEY STONE SURGERY     • LEG SURGERY Left 2002    has dori in left leg     Family History   Problem Relation Age of Onset   • Hypertension Sister      Social  History     Socioeconomic History   • Marital status:    Tobacco Use   • Smoking status: Never   • Smokeless tobacco: Never   Vaping Use   • Vaping Use: Never used   Substance and Sexual Activity   • Alcohol use: Never   • Drug use: Never   • Sexual activity: Defer     Partners: Female     ---------------------------------------------------------------------------------------------------------------------   Allergies:  Sulfa antibiotics  ---------------------------------------------------------------------------------------------------------------------  Objective     ---------------------------------------------------------------------------------------------------------------------   Vital Signs:  Temp:  [98.7 °F (37.1 °C)] 98.7 °F (37.1 °C)  Heart Rate:  [100] 100  Resp:  [18] 18  BP: (127)/(79) 127/79  No data found.  There were no vitals filed for this visit.  There is no height or weight on file to calculate BMI.  Wt Readings from Last 3 Encounters:   10/10/22 78.2 kg (172 lb 6.4 oz)   10/07/22 79.4 kg (175 lb)   09/28/22 79.4 kg (175 lb)       ---------------------------------------------------------------------------------------------------------------------   Physical Exam  Constitutional: Vital sign were reviewed (temperature, pulse, respiration, and blood pressure) and found to be within expected limits per nursing, general appearance was assessed and the patient was found to be in no distress and calm and comfortable appears  Skin: Temperature:normal turgor and temperatureColor: normal, no cyanosis, jaundice, pallor or bruising, Moisture: dry,Nails: thickened yellow toenails bed, Hair:thinning to lower extremities .  Physical Exam  Wound Assessment:  Location: Left, medial, ankle  Dressing Appearance: dressingappearance: clean  Closure: NA  Changes since last exam: no changes   Etiology and classification: non-pressure chronic ulcer   Wound bed structures/characteristics: full-thickness  (subcutaneous tissue is exposed in at least a portion of the wound), Hypergranulation present  Edges moist  Periwound characteristics: moist  Periwound Temperature: normal turgor and temperature  Drainage characteristics: moderate, serous   Perfusion characteristics: Pulses palpable, capillary refill adequate    Wound Goal (s):Closure, Epithelization, Free of infection and No further symptoms  Assessment & Plan      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  -honeygel to base and secure with kerlix and 2 layer compression   - Labs reviewed 11/09/2022: Glucose 98, albumin 4.09, prealbumin 36.5, CRP < 0.30, WBC 14.83, sed rate 7.   -Recommend ailyn protein diet 120g/day along with vitamin C 2000mg/day, vitamin A 5000 Units/day, vitamin D3 5000 Units/day, zinc 50mg/day to help promote wound healing       Multilayer Compression (MLC)-  Today's procedure is the application of a multilayer compression bandage. I have informed the patient of the risks and benefits of this procedure and they have had the opportunity to ask questions. Procedure was performed in a clean field. Multi-layer compression bandage was applied per 's instructions. Patient tolerated procedure well and without complaints of pain or discomfort. Patient was educated regarding signs and symptoms of over compression, including unrelieved pain, toes turning red or purple, or numbness in foot. Patient was instructed to manually remove outer layer (without scissors) and contact clinic immediately , applied to left lower extremity.     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  Time out was called prior to procedure.   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   Instrument(s) used: Curette 4mm  Anesthesia:After checking patient allergies,lidocaine topical 2% was administered to provide anesthesia. 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 The preservice record has been thoroughly documented and reviewed to ensure the circumstances resulting in wound failure were addressed appropriately and with the required interventions. The patient has been compliant with all  recommendations of their comprehensive management to include control of underlying conditions that contributed to the ulcer, appropriate off-loading, and an optimized nutritional plan. Nutritional assessment and planning is based on labs of ALB 3.99, PREALB 18.3.  Adequate circulation/oxygenation to support tissue growth evidenced by an THOM of Right: 1.32, Left: 1.32  Patient's HbA1 NA not diabetic. There is no evidence of underlying osteomyelitis or cellulitis, and any potential nidus of infection or bacterial colonization has been controlled. The ulcer is clean and free of necrotic debris and exudate with a granular base. Ulcer is full (or partial) skin loss , not involving tendon, muscle, joint capsule, or exhibiting bone or sinus tracts with a clean granular bed. At this time, placement of a cellular and/or tissue-based therapy is recommended, because its dermal matrix should provide the ideal environment to support cellular repopulation. The patient is a non-smoker or has refrained from systemic tobacco intake for at least 4 weeks during conservative wound care and prior to this procedure. Appropriate compression bandaging has been provided with diligent use of multilayer bandages, stockings (>20 mmHg) or velcro devices The addition of Skin Substitutes (Puraply, Apligraf) is reasonable and necessary for the treatment of this non healing ulceration, and may afford a healing advantage over dressings and conservative treatments that have proven insufficient to complete heal this patient.     Clinical Impression:Moderate Complexity    Follow-up: 1 week    MICKEY Brasher   [facility]  11/30/22  08:45 EST

## 2022-12-07 ENCOUNTER — HOSPITAL ENCOUNTER (OUTPATIENT)
Dept: WOUND CARE | Facility: HOSPITAL | Age: 67
Discharge: HOME | End: 2022-12-07
Admitting: NURSE PRACTITIONER

## 2022-12-07 VITALS
HEART RATE: 79 BPM | RESPIRATION RATE: 18 BRPM | SYSTOLIC BLOOD PRESSURE: 151 MMHG | TEMPERATURE: 98.3 F | DIASTOLIC BLOOD PRESSURE: 90 MMHG

## 2022-12-07 DIAGNOSIS — L97.325 NON-PRESSURE CHRONIC ULCER OF LEFT ANKLE WITH MUSCLE INVOLVEMENT WITHOUT EVIDENCE OF NECROSIS: ICD-10-CM

## 2022-12-07 DIAGNOSIS — L97.322 NON-PRESSURE CHRONIC ULCER OF LEFT ANKLE WITH FAT LAYER EXPOSED: Primary | ICD-10-CM

## 2022-12-07 RX ORDER — LIDOCAINE HYDROCHLORIDE 20 MG/ML
JELLY TOPICAL AS NEEDED
Status: CANCELLED
Start: 2022-12-07

## 2022-12-07 RX ORDER — SODIUM HYPOCHLORITE 2.5 MG/ML
1 SOLUTION TOPICAL AS NEEDED
Status: CANCELLED | OUTPATIENT
Start: 2022-12-07

## 2022-12-07 RX ORDER — LIDOCAINE HYDROCHLORIDE 20 MG/ML
JELLY TOPICAL AS NEEDED
Status: DISCONTINUED | OUTPATIENT
Start: 2022-12-07 | End: 2022-12-08 | Stop reason: HOSPADM

## 2022-12-07 RX ORDER — SODIUM HYPOCHLORITE 1.25 MG/ML
1 SOLUTION TOPICAL AS NEEDED
Status: CANCELLED | OUTPATIENT
Start: 2022-12-07

## 2022-12-07 RX ORDER — CASTOR OIL AND BALSAM, PERU 788; 87 MG/G; MG/G
1 OINTMENT TOPICAL AS NEEDED
Status: CANCELLED | OUTPATIENT
Start: 2022-12-07

## 2022-12-07 RX ADMIN — LIDOCAINE HYDROCHLORIDE: 20 JELLY TOPICAL at 09:14

## 2022-12-14 ENCOUNTER — HOSPITAL ENCOUNTER (OUTPATIENT)
Dept: WOUND CARE | Facility: HOSPITAL | Age: 67
Discharge: HOME | End: 2022-12-14
Admitting: NURSE PRACTITIONER

## 2022-12-14 VITALS
RESPIRATION RATE: 18 BRPM | SYSTOLIC BLOOD PRESSURE: 135 MMHG | HEART RATE: 90 BPM | DIASTOLIC BLOOD PRESSURE: 79 MMHG | TEMPERATURE: 98.5 F

## 2022-12-14 DIAGNOSIS — L97.325 NON-PRESSURE CHRONIC ULCER OF LEFT ANKLE WITH MUSCLE INVOLVEMENT WITHOUT EVIDENCE OF NECROSIS: ICD-10-CM

## 2022-12-14 DIAGNOSIS — L97.322 NON-PRESSURE CHRONIC ULCER OF LEFT ANKLE WITH FAT LAYER EXPOSED: Primary | ICD-10-CM

## 2022-12-14 LAB
ALBUMIN SERPL-MCNC: 3.62 G/DL (ref 3.5–5.2)
ALBUMIN/GLOB SERPL: 1.1 G/DL
ALP SERPL-CCNC: 71 U/L (ref 39–117)
ALT SERPL W P-5'-P-CCNC: 22 U/L (ref 1–41)
ANION GAP SERPL CALCULATED.3IONS-SCNC: 11.3 MMOL/L (ref 5–15)
AST SERPL-CCNC: 20 U/L (ref 1–40)
BASOPHILS # BLD AUTO: 0.03 10*3/MM3 (ref 0–0.2)
BASOPHILS NFR BLD AUTO: 0.3 % (ref 0–1.5)
BILIRUB SERPL-MCNC: 0.7 MG/DL (ref 0–1.2)
BUN SERPL-MCNC: 19 MG/DL (ref 8–23)
BUN/CREAT SERPL: 27.5 (ref 7–25)
CALCIUM SPEC-SCNC: 9.3 MG/DL (ref 8.6–10.5)
CHLORIDE SERPL-SCNC: 107 MMOL/L (ref 98–107)
CO2 SERPL-SCNC: 22.7 MMOL/L (ref 22–29)
CREAT SERPL-MCNC: 0.69 MG/DL (ref 0.76–1.27)
CRP SERPL-MCNC: 0.67 MG/DL (ref 0–0.5)
DEPRECATED RDW RBC AUTO: 46.5 FL (ref 37–54)
EGFRCR SERPLBLD CKD-EPI 2021: 102.7 ML/MIN/1.73
EOSINOPHIL # BLD AUTO: 0.21 10*3/MM3 (ref 0–0.4)
EOSINOPHIL NFR BLD AUTO: 1.9 % (ref 0.3–6.2)
ERYTHROCYTE [DISTWIDTH] IN BLOOD BY AUTOMATED COUNT: 13.7 % (ref 12.3–15.4)
ERYTHROCYTE [SEDIMENTATION RATE] IN BLOOD: 16 MM/HR (ref 0–20)
GLOBULIN UR ELPH-MCNC: 3.2 GM/DL
GLUCOSE SERPL-MCNC: 103 MG/DL (ref 65–99)
HBA1C MFR BLD: 5.6 % (ref 4.8–5.6)
HCT VFR BLD AUTO: 42.9 % (ref 37.5–51)
HGB BLD-MCNC: 14.3 G/DL (ref 13–17.7)
IMM GRANULOCYTES # BLD AUTO: 0.05 10*3/MM3 (ref 0–0.05)
IMM GRANULOCYTES NFR BLD AUTO: 0.5 % (ref 0–0.5)
LYMPHOCYTES # BLD AUTO: 1.81 10*3/MM3 (ref 0.7–3.1)
LYMPHOCYTES NFR BLD AUTO: 16.6 % (ref 19.6–45.3)
MCH RBC QN AUTO: 30.3 PG (ref 26.6–33)
MCHC RBC AUTO-ENTMCNC: 33.3 G/DL (ref 31.5–35.7)
MCV RBC AUTO: 90.9 FL (ref 79–97)
MONOCYTES # BLD AUTO: 0.73 10*3/MM3 (ref 0.1–0.9)
MONOCYTES NFR BLD AUTO: 6.7 % (ref 5–12)
NEUTROPHILS NFR BLD AUTO: 74 % (ref 42.7–76)
NEUTROPHILS NFR BLD AUTO: 8.08 10*3/MM3 (ref 1.7–7)
NRBC BLD AUTO-RTO: 0 /100 WBC (ref 0–0.2)
PLATELET # BLD AUTO: 258 10*3/MM3 (ref 140–450)
PMV BLD AUTO: 9.6 FL (ref 6–12)
POTASSIUM SERPL-SCNC: 3.7 MMOL/L (ref 3.5–5.2)
PREALB SERPL-MCNC: 30.1 MG/DL (ref 20–40)
PROT SERPL-MCNC: 6.8 G/DL (ref 6–8.5)
RBC # BLD AUTO: 4.72 10*6/MM3 (ref 4.14–5.8)
SODIUM SERPL-SCNC: 141 MMOL/L (ref 136–145)
WBC NRBC COR # BLD: 10.91 10*3/MM3 (ref 3.4–10.8)

## 2022-12-14 PROCEDURE — 87076 CULTURE ANAEROBE IDENT EACH: CPT | Performed by: NURSE PRACTITIONER

## 2022-12-14 PROCEDURE — 85652 RBC SED RATE AUTOMATED: CPT | Performed by: NURSE PRACTITIONER

## 2022-12-14 PROCEDURE — 83036 HEMOGLOBIN GLYCOSYLATED A1C: CPT | Performed by: NURSE PRACTITIONER

## 2022-12-14 PROCEDURE — 36415 COLL VENOUS BLD VENIPUNCTURE: CPT

## 2022-12-14 PROCEDURE — 80053 COMPREHEN METABOLIC PANEL: CPT | Performed by: NURSE PRACTITIONER

## 2022-12-14 PROCEDURE — 87205 SMEAR GRAM STAIN: CPT | Performed by: NURSE PRACTITIONER

## 2022-12-14 PROCEDURE — 87070 CULTURE OTHR SPECIMN AEROBIC: CPT | Performed by: NURSE PRACTITIONER

## 2022-12-14 PROCEDURE — 85025 COMPLETE CBC W/AUTO DIFF WBC: CPT | Performed by: NURSE PRACTITIONER

## 2022-12-14 PROCEDURE — 84134 ASSAY OF PREALBUMIN: CPT | Performed by: NURSE PRACTITIONER

## 2022-12-14 PROCEDURE — 86140 C-REACTIVE PROTEIN: CPT | Performed by: NURSE PRACTITIONER

## 2022-12-14 RX ORDER — CASTOR OIL AND BALSAM, PERU 788; 87 MG/G; MG/G
1 OINTMENT TOPICAL AS NEEDED
Status: CANCELLED | OUTPATIENT
Start: 2022-12-14

## 2022-12-14 RX ORDER — SODIUM HYPOCHLORITE 2.5 MG/ML
1 SOLUTION TOPICAL AS NEEDED
Status: CANCELLED | OUTPATIENT
Start: 2022-12-14

## 2022-12-14 RX ORDER — NYSTATIN 100000 [USP'U]/G
POWDER TOPICAL EVERY 12 HOURS SCHEDULED
Status: DISCONTINUED | OUTPATIENT
Start: 2022-12-14 | End: 2022-12-15 | Stop reason: HOSPADM

## 2022-12-14 RX ORDER — LIDOCAINE HYDROCHLORIDE 20 MG/ML
JELLY TOPICAL AS NEEDED
Status: CANCELLED
Start: 2022-12-14

## 2022-12-14 RX ORDER — SODIUM HYPOCHLORITE 1.25 MG/ML
1 SOLUTION TOPICAL AS NEEDED
Status: CANCELLED | OUTPATIENT
Start: 2022-12-14

## 2022-12-14 RX ORDER — LIDOCAINE HYDROCHLORIDE 20 MG/ML
JELLY TOPICAL AS NEEDED
Status: DISCONTINUED | OUTPATIENT
Start: 2022-12-14 | End: 2022-12-15 | Stop reason: HOSPADM

## 2022-12-14 RX ADMIN — LIDOCAINE HYDROCHLORIDE: 20 JELLY TOPICAL at 09:06

## 2022-12-14 RX ADMIN — NYSTATIN: 100000 POWDER TOPICAL at 09:28

## 2022-12-14 NOTE — PROGRESS NOTES
Wound Clinic Note  Patient Identification:  Name:  Broderick Pereira  Age:  67 y.o.  Sex:  male  :  1955  MRN:  [id number]   Visit Number:  [id number]  Primary Care Physician:  Allyson Fox APRN     Subjective     Chief complaint:     Left ankle wound    History of presenting illness:     Patient is a 67 y.o. male with past medical history significant for  that presented today for evaluation of left medial ankle. Reports wound has been present for several weeks 3-4 approximately. Unsure of how the area occurred just noticed the area. Denies any trauma to the area that he can recall. He has been applying triple antibiotic to the area. He denies history of diabetes. Is non-smoker. He is currently on Doxycycline started 2022 prescribed by PCP. Denies any prior vascular disease. He has some imaging completed ordered by PCP: Arterial US 2022: Unremarkable exam with no occlusive segments or segments of high-grade. Venous US: No DVT in the left lower extremity on today's exam.  X-ray foot 2022: Soft tissue defect in the posterior subcutaneous tissues at the level of the distal tibia, but no erosion of bone. X-ray ankle 2022: No evidence of osteomyelitis on today's exam.    Interval History:   10/05/2022: Patient seen in clinic today for follow-up to left medial ankle wound. He has had wound vac in place and wife and been performing changes at home. He has increase in swelling and erythema. He states he has been on his feet with work the past several days. Denies any fever or chills. Reports some increase in pain after being on his feet for several hours at work.     10/19/2022: Patient seen in clinic today for follow-up to left medial ankle ulcer. Wound unchanged, continues with yellow slough. Denies any fever or chills. He reports adding meghan to diet and increasing protein intake to help promote wound healing. Swelling appears to be controlled. No new issues or concerns reported.      10/26/2022: Patient seen in clinic today for follow-up to left medial ankle ulcer. Wound stable, there is slight decrease in depth. Continues with bioburden coverage. He is tolerating current wound treatment. Denies any fever or chills. He states he is consuming protein supplements and meghan. No new issues or concerns reported.    11/02/2022:Patient seen in clinic today for follow-up to left medial ankle ulcer. Wound stable, depth has slightly increased. Continues with bioburden coverage. He is tolerating current wound treatment.  He left compression wraps in place, there was reported odor upon removal of dressing. Denies any fever or chills. He states he is consuming protein supplements and meghan. No new issues or concerns reported.    11/09/2022: Patient seen in clinic today for follow-up to left medial ankle ulcer. Wound now with hypergranulation to the distal portion. Minimal pain reported. Denies any fever or chills. He had 2 layer compression wrap applied and had left in place for 4 days before having to remove. No new issues or concerns reported. Labs reviewed: Glucose 98, albumin 4.09, prealbumin 36.5, CRP < 0.30, WBC 14.83, sed rate 7.     11/16/2022: Patient seen in clinic today for follow-up to left medial ankle ulcer. Wound decrease in depth. There continues to be hypergranulation to distal portion.  Denies any fever or chills. He continues with meghan and high protein intake.. Swelling appears to be controlled. No new issues or concerns reported.  Tolerating current treatment with compression wraps.     11/23/2022: Patient seen in clinic today for follow-up to left medial ankle ulcer. Wound stable, depth continues to be deep on the proximal wound. Denies any fever or chills. He states wife smelt an order the last few days. No other issues or concerns reported at this time. He was able to wear compression wraps for full week. No concerns to wrap.     11/30/2022: Patient seen in clinic today for  follow-up to left medial ankle ulcer. Wound with hypergranulation to distal wound. Slough remains present. Denies any fever or chills. There is increase in erythema to periwound, this could be from  Increase in drainage versus acute infection. No other issues or concerns reported at this time. He was able to wear compression wraps for full week. No concerns to wrap.     12/06/2022: Patient seen in clinic today for follow-up to left medial ankle ulcer. Wound with hypergranulation to distal wound this continues julian present. Slough remains present slightly decreased from prior exam.  Denies any fever or chills. There is increase in erythema to periwound, this could be from  Increase in drainage versus acute infection. No other issues or concerns reported at this time. He was able to wear compression wraps for full week. No concerns to wrap.   ---------------------------------------------------------------------------------------------------------------------   Review of Systems   Constitutional: Negative for chills and fever.   HENT: Negative for congestion and rhinorrhea.    Respiratory: Negative for cough and shortness of breath.    Cardiovascular: Positive for leg swelling. Negative for chest pain.        Intermittent    Gastrointestinal: Negative for nausea and vomiting.   Musculoskeletal: Negative for back pain and gait problem.   Skin: Positive for wound.   Hematological: Does not bruise/bleed easily.      ---------------------------------------------------------------------------------------------------------------------   Past Medical History:   Diagnosis Date   • Arthritis    • Hypertension    • Kidney stone    • Sleep apnea      Past Surgical History:   Procedure Laterality Date   • ABDOMINAL SURGERY     • CYSTOLITHALOPAXY PERCUTANEOUS N/A 10/10/2022    Procedure: CYSTOLITHOLAPAXY WITH LASER;  Surgeon: Merrick Gutierrez MD;  Location: [facility];  Service: Urology;  Laterality: N/A;   • CYSTOSCOPY,  URETEROSCOPY, RETROGRADE PYELOGRAM, STENT INSERTION Left 10/10/2022    Procedure: URETEROSCOPY RETROGRADE PYELOGRAM HOLMIUM LASER STENT INSERTION;  Surgeon: Merrick Gutierrez MD;  Location: [facility];  Service: Urology;  Laterality: Left;   • FRACTURE SURGERY     • GALLBLADDER SURGERY      15 or more years ago   • KIDNEY STONE SURGERY     • LEG SURGERY Left 2002    has dori in left leg     Family History   Problem Relation Age of Onset   • Hypertension Sister      Social History     Socioeconomic History   • Marital status:    Tobacco Use   • Smoking status: Never   • Smokeless tobacco: Never   Vaping Use   • Vaping Use: Never used   Substance and Sexual Activity   • Alcohol use: Never   • Drug use: Never   • Sexual activity: Defer     Partners: Female     ---------------------------------------------------------------------------------------------------------------------   Allergies:  Sulfa antibiotics  ---------------------------------------------------------------------------------------------------------------------  Objective     ---------------------------------------------------------------------------------------------------------------------   Vital Signs:     No data found.  There were no vitals filed for this visit.  There is no height or weight on file to calculate BMI.  Wt Readings from Last 3 Encounters:   10/10/22 78.2 kg (172 lb 6.4 oz)   10/07/22 79.4 kg (175 lb)   09/28/22 79.4 kg (175 lb)       ---------------------------------------------------------------------------------------------------------------------   Physical Exam  Constitutional: Vital sign were reviewed (temperature, pulse, respiration, and blood pressure) and found to be within expected limits per nursing, general appearance was assessed and the patient was found to be in no distress and calm and comfortable appears  Skin: Temperature:normal turgor and temperatureColor: normal, no cyanosis, jaundice, pallor or bruising,  Moisture: dry,Nails: thickened yellow toenails bed, Hair:thinning to lower extremities .  Physical Exam  Wound Assessment:  Location: Left, medial, ankle  Dressing Appearance: dressingappearance: clean  Closure: NA  Changes since last exam: no changes   Etiology and classification: non-pressure chronic ulcer   Wound bed structures/characteristics: full-thickness (subcutaneous tissue is exposed in at least a portion of the wound), Hypergranulation present  Edges moist  Periwound characteristics: moist  Periwound Temperature: normal turgor and temperature  Drainage characteristics: moderate, serous   Perfusion characteristics: Pulses palpable, capillary refill adequate    Wound Goal (s):Closure, Epithelization, Free of infection and No further symptoms  Assessment & Plan      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  -honeygel to base and secure with kerlix and 2 layer compression   - Labs reviewed 11/09/2022: Glucose 98, albumin 4.09, prealbumin 36.5, CRP < 0.30, WBC 14.83, sed rate 7.   -Recommend ailyn protein diet 120g/day along with vitamin C 2000mg/day, vitamin A 5000 Units/day, vitamin D3 5000 Units/day, zinc 50mg/day to help promote wound healing       Multilayer Compression (MLC)-  Today's procedure is the application of a multilayer compression bandage. I have informed the patient of the risks and benefits of this procedure and they have had the opportunity to ask questions. Procedure was performed in a clean field. Multi-layer compression bandage was applied per 's instructions. Patient tolerated procedure well and without complaints of pain or discomfort. Patient was educated regarding signs and symptoms of over compression, including unrelieved pain, toes turning red or purple, or numbness in foot. Patient was instructed to manually remove outer layer (without  scissors) and contact clinic immediately , applied to left lower extremity.     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  Time out was called prior to procedure.   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   Instrument(s) used: Curette 4mm  Anesthesia:After checking patient allergies,lidocaine topical 2% was administered to provide anesthesia. 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 The preservice record has been thoroughly documented and reviewed to ensure the circumstances resulting in wound failure were addressed appropriately and with the required interventions. The patient has been compliant with all recommendations of their comprehensive management to include control of underlying conditions that contributed to the ulcer, appropriate off-loading, and an optimized nutritional plan. Nutritional assessment and planning is based on labs of ALB 3.99, PREALB 18.3.  Adequate circulation/oxygenation to support tissue growth evidenced by an THOM of Right: 1.32, Left: 1.32  Patient's HbA1 NA not diabetic. There is no evidence of underlying osteomyelitis or cellulitis, and any potential nidus of infection or bacterial colonization has been controlled. The ulcer is clean and free of necrotic debris and exudate with a granular base. Ulcer is full (or partial) skin loss , not involving tendon, muscle, joint capsule, or exhibiting bone or sinus tracts with a clean granular bed. At this time, placement of a cellular and/or tissue-based therapy is recommended, because its dermal matrix should provide the ideal environment to support cellular repopulation. The patient is a non-smoker or has refrained from systemic tobacco intake for at least 4 weeks during conservative wound care and prior to this procedure. Appropriate compression bandaging has been provided with diligent use of multilayer bandages, stockings (>20 mmHg) or velcro devices The addition of Skin Substitutes (Puraply, Apligraf) is reasonable and necessary for the treatment of this non healing ulceration, and may afford a healing advantage over dressings and conservative treatments that have proven insufficient to complete heal this patient.     Clinical Impression:Moderate Complexity    Follow-up: 1 week    Caron  MICKEY Weinberg   [facility]  12/07/2022  [id number]

## 2022-12-14 NOTE — PROGRESS NOTES
Wound Clinic Note  Patient Identification:  Name:  Broderick Pereira  Age:  67 y.o.  Sex:  male  :  1955  MRN:  [id number]   Visit Number:  [id number]  Primary Care Physician:  Allyson Fox APRN     Subjective     Chief complaint:     Left ankle wound    History of presenting illness:     Patient is a 67 y.o. male with past medical history significant for  that presented today for evaluation of left medial ankle. Reports wound has been present for several weeks 3-4 approximately. Unsure of how the area occurred just noticed the area. Denies any trauma to the area that he can recall. He has been applying triple antibiotic to the area. He denies history of diabetes. Is non-smoker. He is currently on Doxycycline started 2022 prescribed by PCP. Denies any prior vascular disease. He has some imaging completed ordered by PCP: Arterial US 2022: Unremarkable exam with no occlusive segments or segments of high-grade. Venous US: No DVT in the left lower extremity on today's exam.  X-ray foot 2022: Soft tissue defect in the posterior subcutaneous tissues at the level of the distal tibia, but no erosion of bone. X-ray ankle 2022: No evidence of osteomyelitis on today's exam.    Interval History:   10/05/2022: Patient seen in clinic today for follow-up to left medial ankle wound. He has had wound vac in place and wife and been performing changes at home. He has increase in swelling and erythema. He states he has been on his feet with work the past several days. Denies any fever or chills. Reports some increase in pain after being on his feet for several hours at work.     10/19/2022: Patient seen in clinic today for follow-up to left medial ankle ulcer. Wound unchanged, continues with yellow slough. Denies any fever or chills. He reports adding meghan to diet and increasing protein intake to help promote wound healing. Swelling appears to be controlled. No new issues or concerns reported.      10/26/2022: Patient seen in clinic today for follow-up to left medial ankle ulcer. Wound stable, there is slight decrease in depth. Continues with bioburden coverage. He is tolerating current wound treatment. Denies any fever or chills. He states he is consuming protein supplements and meghan. No new issues or concerns reported.    11/02/2022:Patient seen in clinic today for follow-up to left medial ankle ulcer. Wound stable, depth has slightly increased. Continues with bioburden coverage. He is tolerating current wound treatment.  He left compression wraps in place, there was reported odor upon removal of dressing. Denies any fever or chills. He states he is consuming protein supplements and meghan. No new issues or concerns reported.    11/09/2022: Patient seen in clinic today for follow-up to left medial ankle ulcer. Wound now with hypergranulation to the distal portion. Minimal pain reported. Denies any fever or chills. He had 2 layer compression wrap applied and had left in place for 4 days before having to remove. No new issues or concerns reported. Labs reviewed: Glucose 98, albumin 4.09, prealbumin 36.5, CRP < 0.30, WBC 14.83, sed rate 7.     11/16/2022: Patient seen in clinic today for follow-up to left medial ankle ulcer. Wound decrease in depth. There continues to be hypergranulation to distal portion.  Denies any fever or chills. He continues with meghan and high protein intake.. Swelling appears to be controlled. No new issues or concerns reported.  Tolerating current treatment with compression wraps.     11/23/2022: Patient seen in clinic today for follow-up to left medial ankle ulcer. Wound stable, depth continues to be deep on the proximal wound. Denies any fever or chills. He states wife smelt an order the last few days. No other issues or concerns reported at this time. He was able to wear compression wraps for full week. No concerns to wrap.     11/30/2022: Patient seen in clinic today for  follow-up to left medial ankle ulcer. Wound with hypergranulation to distal wound. Slough remains present. Denies any fever or chills. There is increase in erythema to periwound, this could be from  Increase in drainage versus acute infection. No other issues or concerns reported at this time. He was able to wear compression wraps for full week. No concerns to wrap.     12/06/2022: Patient seen in clinic today for follow-up to left medial ankle ulcer. Wound with hypergranulation to distal wound this continues julian present. Slough remains present slightly decreased from prior exam.  Denies any fever or chills. There is increase in erythema to periwound, this could be from  Increase in drainage versus acute infection. No other issues or concerns reported at this time. He was able to wear compression wraps for full week. No concerns to wrap.     12/14/2022:  Patient seen in clinic today for follow-up to left medial ankle ulcer. Wound base red and moist, scattered yellow Slough remains present. Denies any fever or chills. Erythema to periwound is present, no warmth. Reports mild pain to the site.  No other issues or concerns reported at this time. He was able to wear compression wraps for full week. No concerns to wrap.  ---------------------------------------------------------------------------------------------------------------------   Review of Systems   Constitutional: Negative for chills and fever.   HENT: Negative for congestion and rhinorrhea.    Respiratory: Negative for cough and shortness of breath.    Cardiovascular: Positive for leg swelling. Negative for chest pain.        Intermittent    Gastrointestinal: Negative for nausea and vomiting.   Musculoskeletal: Negative for back pain and gait problem.   Skin: Positive for wound.   Hematological: Does not bruise/bleed easily.      ---------------------------------------------------------------------------------------------------------------------   Past  Medical History:   Diagnosis Date   • Arthritis    • Hypertension    • Kidney stone    • Sleep apnea      Past Surgical History:   Procedure Laterality Date   • ABDOMINAL SURGERY     • CYSTOLITHALOPAXY PERCUTANEOUS N/A 10/10/2022    Procedure: CYSTOLITHOLAPAXY WITH LASER;  Surgeon: Merrick Gutierrez MD;  Location: [facility];  Service: Urology;  Laterality: N/A;   • CYSTOSCOPY, URETEROSCOPY, RETROGRADE PYELOGRAM, STENT INSERTION Left 10/10/2022    Procedure: URETEROSCOPY RETROGRADE PYELOGRAM HOLMIUM LASER STENT INSERTION;  Surgeon: Merrick Gutierrez MD;  Location: [facility];  Service: Urology;  Laterality: Left;   • FRACTURE SURGERY     • GALLBLADDER SURGERY      15 or more years ago   • KIDNEY STONE SURGERY     • LEG SURGERY Left 2002    has dori in left leg     Family History   Problem Relation Age of Onset   • Hypertension Sister      Social History     Socioeconomic History   • Marital status:    Tobacco Use   • Smoking status: Never   • Smokeless tobacco: Never   Vaping Use   • Vaping Use: Never used   Substance and Sexual Activity   • Alcohol use: Never   • Drug use: Never   • Sexual activity: Defer     Partners: Female     ---------------------------------------------------------------------------------------------------------------------   Allergies:  Sulfa antibiotics  ---------------------------------------------------------------------------------------------------------------------  Objective     ---------------------------------------------------------------------------------------------------------------------   Vital Signs:  Temp:  [98.5 °F (36.9 °C)] 98.5 °F (36.9 °C)  Heart Rate:  [90] 90  Resp:  [18] 18  BP: (135)/(79) 135/79  No data found.  There were no vitals filed for this visit.  There is no height or weight on file to calculate BMI.  Wt Readings from Last 3 Encounters:   10/10/22 78.2 kg (172 lb 6.4 oz)   10/07/22 79.4 kg (175 lb)   09/28/22 79.4 kg (175 lb)        ---------------------------------------------------------------------------------------------------------------------   Physical Exam  Constitutional: Vital sign were reviewed (temperature, pulse, respiration, and blood pressure) and found to be within expected limits per nursing, general appearance was assessed and the patient was found to be in no distress and calm and comfortable appears  Skin: Temperature:normal turgor and temperatureColor: normal, no cyanosis, jaundice, pallor or bruising, Moisture: dry,Nails: thickened yellow toenails bed, Hair:thinning to lower extremities .  Physical Exam  Wound Assessment:  Location: Left, medial, ankle  Dressing Appearance: dressingappearance: clean  Closure: NA  Changes since last exam: no changes   Etiology and classification: non-pressure chronic ulcer   Wound bed structures/characteristics: full-thickness (subcutaneous tissue is exposed in at least a portion of the wound)  Edges moist  Periwound characteristics: moist  Periwound Temperature: normal turgor and temperature  Drainage characteristics: moderate, serous   Perfusion characteristics: Pulses palpable, capillary refill adequate    Wound Goal (s):Closure, Epithelization, Free of infection and No further symptoms  Assessment & Plan      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  -Iodoform to base and secure with kerlix and 2 layer compression   -Labs ordered: CBC, CMP, CRP, sed rate, prealbumin, and hemoglobain A1C to assess inflammatory markers and nutritional status as this both and negatively impact wound healing if not properly treated.   - Labs reviewed 11/09/2022: Glucose 98, albumin 4.09, prealbumin 36.5, CRP < 0.30, WBC 14.83, sed rate 7.   -Recommend ailyn protein diet 120g/day along with vitamin C 2000mg/day, vitamin A 5000 Units/day, vitamin D3 5000 Units/day, zinc 50mg/day to  help promote wound healing     Multilayer Compression (MLC)-  Today's procedure is the application of a multilayer compression bandage. I have informed the patient of the risks and benefits of this procedure and they have had the opportunity to ask questions. Procedure was performed in a clean field. Multi-layer compression bandage was applied per 's instructions. Patient tolerated procedure well and without complaints of pain or discomfort. Patient was educated regarding signs and symptoms of over compression, including unrelieved pain, toes turning red or purple, or numbness in foot. Patient was instructed to manually remove outer layer (without scissors) and contact clinic immediately , applied to left lower extremity.     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  Time out was called prior to procedure.   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   Instrument(s) used: Curette 4mm  Anesthesia:After checking patient allergies,lidocaine topical 2% was administered to provide anesthesia. 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 The preservice record has been thoroughly documented and reviewed to ensure the circumstances resulting in wound failure were addressed appropriately and with the required interventions. The patient has been compliant with all recommendations of their comprehensive management to include control of underlying conditions that contributed to the ulcer, appropriate off-loading, and an optimized nutritional plan. Nutritional assessment and planning is based on labs of ALB 3.99, PREALB 18.3.  Adequate circulation/oxygenation to support tissue growth evidenced by an THOM of Right: 1.32, Left: 1.32  Patient's HbA1 NA not diabetic. There is no evidence of underlying osteomyelitis or cellulitis, and any potential nidus of infection or bacterial colonization has been controlled. The ulcer is clean and free of necrotic debris and exudate with a granular base. Ulcer is full (or partial) skin loss , not involving tendon, muscle, joint capsule, or exhibiting bone or sinus tracts with a clean granular bed. At this time, placement of a cellular and/or tissue-based therapy is recommended, because its dermal matrix should provide the ideal environment  to support cellular repopulation. The patient is a non-smoker or has refrained from systemic tobacco intake for at least 4 weeks during conservative wound care and prior to this procedure. Appropriate compression bandaging has been provided with diligent use of multilayer bandages, stockings (>20 mmHg) or velcro devices The addition of Skin Substitutes (Puraply, Apligraf) is reasonable and necessary for the treatment of this non healing ulceration, and may afford a healing advantage over dressings and conservative treatments that have proven insufficient to complete heal this patient.     Clinical Impression:Moderate Complexity    Follow-up: 1 week    MICKEY Brasher   [facility]  12/14/2022  [id number]

## 2022-12-16 LAB
BACTERIA SPEC AEROBE CULT: ABNORMAL
GRAM STN SPEC: ABNORMAL
GRAM STN SPEC: ABNORMAL

## 2022-12-21 ENCOUNTER — HOSPITAL ENCOUNTER (OUTPATIENT)
Dept: WOUND CARE | Facility: HOSPITAL | Age: 67
Discharge: HOME | End: 2022-12-21
Admitting: NURSE PRACTITIONER

## 2022-12-21 VITALS
RESPIRATION RATE: 18 BRPM | TEMPERATURE: 98.3 F | SYSTOLIC BLOOD PRESSURE: 130 MMHG | HEART RATE: 79 BPM | DIASTOLIC BLOOD PRESSURE: 86 MMHG

## 2022-12-21 DIAGNOSIS — L97.325 NON-PRESSURE CHRONIC ULCER OF LEFT ANKLE WITH MUSCLE INVOLVEMENT WITHOUT EVIDENCE OF NECROSIS: ICD-10-CM

## 2022-12-21 DIAGNOSIS — L97.322 NON-PRESSURE CHRONIC ULCER OF LEFT ANKLE WITH FAT LAYER EXPOSED: Primary | ICD-10-CM

## 2022-12-21 RX ORDER — LIDOCAINE HYDROCHLORIDE 20 MG/ML
JELLY TOPICAL AS NEEDED
Status: CANCELLED
Start: 2022-12-21

## 2022-12-21 RX ORDER — SODIUM HYPOCHLORITE 2.5 MG/ML
1 SOLUTION TOPICAL AS NEEDED
Status: CANCELLED | OUTPATIENT
Start: 2022-12-21

## 2022-12-21 RX ORDER — LIDOCAINE HYDROCHLORIDE 20 MG/ML
JELLY TOPICAL AS NEEDED
Status: DISCONTINUED | OUTPATIENT
Start: 2022-12-21 | End: 2022-12-22 | Stop reason: HOSPADM

## 2022-12-21 RX ORDER — SODIUM HYPOCHLORITE 1.25 MG/ML
1 SOLUTION TOPICAL AS NEEDED
Status: CANCELLED | OUTPATIENT
Start: 2022-12-21

## 2022-12-21 RX ORDER — CASTOR OIL AND BALSAM, PERU 788; 87 MG/G; MG/G
1 OINTMENT TOPICAL AS NEEDED
Status: CANCELLED | OUTPATIENT
Start: 2022-12-21

## 2022-12-21 RX ADMIN — LIDOCAINE HYDROCHLORIDE: 20 JELLY TOPICAL at 09:05

## 2022-12-21 NOTE — ADDENDUM NOTE
Encounter addended by: Lashanda Antonio RN on: 12/21/2022 9:42 AM   Actions taken: Charge Capture section accepted, Flowsheet accepted

## 2022-12-21 NOTE — ADDENDUM NOTE
Encounter addended by: Lashanda Antonio RN on: 12/21/2022 10:17 AM   Actions taken: MAR administration accepted

## 2022-12-21 NOTE — PROGRESS NOTES
Wound Clinic Note  Patient Identification:  Name:  Broderick Pereira  Age:  67 y.o.  Sex:  male  :  1955  MRN:  [id number]   Visit Number:  [id number]  Primary Care Physician:  Allyson Fox APRN     Subjective     Chief complaint:     Left ankle wound    History of presenting illness:     Patient is a 67 y.o. male with past medical history significant for  that presented today for evaluation of left medial ankle. Reports wound has been present for several weeks 3-4 approximately. Unsure of how the area occurred just noticed the area. Denies any trauma to the area that he can recall. He has been applying triple antibiotic to the area. He denies history of diabetes. Is non-smoker. He is currently on Doxycycline started 2022 prescribed by PCP. Denies any prior vascular disease. He has some imaging completed ordered by PCP: Arterial US 2022: Unremarkable exam with no occlusive segments or segments of high-grade. Venous US: No DVT in the left lower extremity on today's exam.  X-ray foot 2022: Soft tissue defect in the posterior subcutaneous tissues at the level of the distal tibia, but no erosion of bone. X-ray ankle 2022: No evidence of osteomyelitis on today's exam.    Interval History:   10/05/2022: Patient seen in clinic today for follow-up to left medial ankle wound. He has had wound vac in place and wife and been performing changes at home. He has increase in swelling and erythema. He states he has been on his feet with work the past several days. Denies any fever or chills. Reports some increase in pain after being on his feet for several hours at work.     10/19/2022: Patient seen in clinic today for follow-up to left medial ankle ulcer. Wound unchanged, continues with yellow slough. Denies any fever or chills. He reports adding meghan to diet and increasing protein intake to help promote wound healing. Swelling appears to be controlled. No new issues or concerns reported.      10/26/2022: Patient seen in clinic today for follow-up to left medial ankle ulcer. Wound stable, there is slight decrease in depth. Continues with bioburden coverage. He is tolerating current wound treatment. Denies any fever or chills. He states he is consuming protein supplements and meghan. No new issues or concerns reported.    11/02/2022:Patient seen in clinic today for follow-up to left medial ankle ulcer. Wound stable, depth has slightly increased. Continues with bioburden coverage. He is tolerating current wound treatment.  He left compression wraps in place, there was reported odor upon removal of dressing. Denies any fever or chills. He states he is consuming protein supplements and meghan. No new issues or concerns reported.    11/09/2022: Patient seen in clinic today for follow-up to left medial ankle ulcer. Wound now with hypergranulation to the distal portion. Minimal pain reported. Denies any fever or chills. He had 2 layer compression wrap applied and had left in place for 4 days before having to remove. No new issues or concerns reported. Labs reviewed: Glucose 98, albumin 4.09, prealbumin 36.5, CRP < 0.30, WBC 14.83, sed rate 7.     11/16/2022: Patient seen in clinic today for follow-up to left medial ankle ulcer. Wound decrease in depth. There continues to be hypergranulation to distal portion.  Denies any fever or chills. He continues with meghan and high protein intake.. Swelling appears to be controlled. No new issues or concerns reported.  Tolerating current treatment with compression wraps.     11/23/2022: Patient seen in clinic today for follow-up to left medial ankle ulcer. Wound stable, depth continues to be deep on the proximal wound. Denies any fever or chills. He states wife smelt an order the last few days. No other issues or concerns reported at this time. He was able to wear compression wraps for full week. No concerns to wrap.     11/30/2022: Patient seen in clinic today for  follow-up to left medial ankle ulcer. Wound with hypergranulation to distal wound. Slough remains present. Denies any fever or chills. There is increase in erythema to periwound, this could be from  Increase in drainage versus acute infection. No other issues or concerns reported at this time. He was able to wear compression wraps for full week. No concerns to wrap.     12/06/2022: Patient seen in clinic today for follow-up to left medial ankle ulcer. Wound with hypergranulation to distal wound this continues julian present. Slough remains present slightly decreased from prior exam.  Denies any fever or chills. There is increase in erythema to periwound, this could be from  Increase in drainage versus acute infection. No other issues or concerns reported at this time. He was able to wear compression wraps for full week. No concerns to wrap.     12/14/2022:  Patient seen in clinic today for follow-up to left medial ankle ulcer. Wound base red and moist, scattered yellow Slough remains present. Denies any fever or chills. Erythema to periwound is present, no warmth. Reports mild pain to the site.  No other issues or concerns reported at this time. He was able to wear compression wraps for full week. No concerns to wrap.    12/21/2022: Patient seen in clinic today for follow-up to left medial ankle ulcer. Wound base red and moist, scattered yellow Slough remains present. Denies any fever or chills. Erythema to periwound has improved.  Reports mild pain to the site.  No other issues or concerns reported at this time. He was able to wear compression wraps for full week. No concerns to wrap.  ---------------------------------------------------------------------------------------------------------------------   Review of Systems   Constitutional: Negative for chills and fever.   HENT: Negative for congestion and rhinorrhea.    Respiratory: Negative for cough and shortness of breath.    Cardiovascular: Positive for leg  swelling. Negative for chest pain.        Intermittent    Gastrointestinal: Negative for nausea and vomiting.   Musculoskeletal: Negative for back pain and gait problem.   Skin: Positive for wound.   Hematological: Does not bruise/bleed easily.      ---------------------------------------------------------------------------------------------------------------------   Past Medical History:   Diagnosis Date   • Arthritis    • Hypertension    • Kidney stone    • Sleep apnea      Past Surgical History:   Procedure Laterality Date   • ABDOMINAL SURGERY     • CYSTOLITHALOPAXY PERCUTANEOUS N/A 10/10/2022    Procedure: CYSTOLITHOLAPAXY WITH LASER;  Surgeon: Merrick Gutierrez MD;  Location: [facility];  Service: Urology;  Laterality: N/A;   • CYSTOSCOPY, URETEROSCOPY, RETROGRADE PYELOGRAM, STENT INSERTION Left 10/10/2022    Procedure: URETEROSCOPY RETROGRADE PYELOGRAM HOLMIUM LASER STENT INSERTION;  Surgeon: Merrick Gutierrez MD;  Location: [facility];  Service: Urology;  Laterality: Left;   • FRACTURE SURGERY     • GALLBLADDER SURGERY      15 or more years ago   • KIDNEY STONE SURGERY     • LEG SURGERY Left 2002    has dori in left leg     Family History   Problem Relation Age of Onset   • Hypertension Sister      Social History     Socioeconomic History   • Marital status:    Tobacco Use   • Smoking status: Never   • Smokeless tobacco: Never   Vaping Use   • Vaping Use: Never used   Substance and Sexual Activity   • Alcohol use: Never   • Drug use: Never   • Sexual activity: Defer     Partners: Female     ---------------------------------------------------------------------------------------------------------------------   Allergies:  Sulfa antibiotics  ---------------------------------------------------------------------------------------------------------------------  Objective     ---------------------------------------------------------------------------------------------------------------------   Vital  Signs:     No data found.  There were no vitals filed for this visit.  There is no height or weight on file to calculate BMI.  Wt Readings from Last 3 Encounters:   10/10/22 78.2 kg (172 lb 6.4 oz)   10/07/22 79.4 kg (175 lb)   09/28/22 79.4 kg (175 lb)       ---------------------------------------------------------------------------------------------------------------------   Physical Exam  Constitutional: Vital sign were reviewed (temperature, pulse, respiration, and blood pressure) and found to be within expected limits per nursing, general appearance was assessed and the patient was found to be in no distress and calm and comfortable appears  Skin: Temperature:normal turgor and temperatureColor: normal, no cyanosis, jaundice, pallor or bruising, Moisture: dry,Nails: thickened yellow toenails bed, Hair:thinning to lower extremities .  Physical Exam  Wound Assessment:  Location: Left, medial, ankle  Dressing Appearance: dressingappearance: clean  Closure: NA  Changes since last exam: no changes   Etiology and classification: non-pressure chronic ulcer   Wound bed structures/characteristics: full-thickness (subcutaneous tissue is exposed in at least a portion of the wound)  Edges moist  Periwound characteristics: moist  Periwound Temperature: normal turgor and temperature  Drainage characteristics: moderate, serous   Perfusion characteristics: Pulses palpable, capillary refill adequate    Wound Goal (s):Closure, Epithelization, Free of infection and No further symptoms  Assessment & Plan      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  -Iodoform to base and secure with kerlix and ACE  -Will hold off on compression wraps this week due to concerns of infection. He is being treated with topical antibiotic, he will receive this week.   - Labs reviewed 11/09/2022: Glucose 98, albumin 4.09,  prealbumin 36.5, CRP < 0.30, WBC 14.83, sed rate 7.   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  Time out was called prior to procedure.   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   Instrument(s) used: Curette 4mm  Anesthesia:After checking patient allergies,lidocaine topical 2% was administered to provide anesthesia. and 2% Injectable lidocaine was used  Tissue removed: subuctaneous, Percent Removed 80%  Culture or Biopsy: culture and sensitivity  Estimated Blood Loss: Moderate  Hemostasis Obtained: pressure and Surgicel     EVALUATION:  The application of skin substitute has been considered for this non-healing ulcer located on Non-pressure  chronic ulcer of left ankle with muscle involvement without evidence of necrosis (HCC) [L97.325] The ulcer has failed to show evidence of healing by adequate advancement of the epithelial margin after 30 days of comprehensive management and standard conservative wound care. The preservice record has been thoroughly documented and reviewed to ensure the circumstances resulting in wound failure were addressed appropriately and with the required interventions. The patient has been compliant with all recommendations of their comprehensive management to include control of underlying conditions that contributed to the ulcer, appropriate off-loading, and an optimized nutritional plan. Nutritional assessment and planning is based on labs of ALB 3.99, PREALB 18.3.  Adequate circulation/oxygenation to support tissue growth evidenced by an THOM of Right: 1.32, Left: 1.32  Patient's HbA1 NA not diabetic. There is no evidence of underlying osteomyelitis or cellulitis, and any potential nidus of infection or bacterial colonization has been controlled. The ulcer is clean and free of necrotic debris and exudate with a granular base. Ulcer is full (or partial) skin loss , not involving tendon, muscle, joint capsule, or exhibiting bone or sinus tracts with a clean granular bed. At this time, placement of a cellular and/or tissue-based therapy is recommended, because its dermal matrix should provide the ideal environment to support cellular repopulation. The patient is a non-smoker or has refrained from systemic tobacco intake for at least 4 weeks during conservative wound care and prior to this procedure. Appropriate compression bandaging has been provided with diligent use of multilayer bandages, stockings (>20 mmHg) or velcro devices The addition of Skin Substitutes (Puraply, Apligraf) is reasonable and necessary for the treatment of this non healing ulceration, and may afford a healing advantage over dressings and conservative  treatments that have proven insufficient to complete heal this patient.     Clinical Impression:Moderate Complexity    Follow-up: 1 week    MICKEY Brasher   [facility]  12/21/2022  [id number]

## 2022-12-28 ENCOUNTER — HOSPITAL ENCOUNTER (OUTPATIENT)
Dept: WOUND CARE | Facility: HOSPITAL | Age: 67
Discharge: HOME | End: 2022-12-28
Admitting: NURSE PRACTITIONER

## 2022-12-28 VITALS
RESPIRATION RATE: 18 BRPM | DIASTOLIC BLOOD PRESSURE: 75 MMHG | SYSTOLIC BLOOD PRESSURE: 124 MMHG | TEMPERATURE: 98.4 F | HEART RATE: 80 BPM

## 2022-12-28 RX ORDER — FLUCONAZOLE 100 MG/1
TABLET ORAL
COMMUNITY
Start: 2022-12-26

## 2022-12-28 RX ORDER — AMOXICILLIN 875 MG/1
875 TABLET, COATED ORAL 2 TIMES DAILY
COMMUNITY
Start: 2022-12-13

## 2022-12-28 NOTE — PROGRESS NOTES
Wound Clinic Note  Patient Identification:  Name:  Broderick Pereira  Age:  67 y.o.  Sex:  male  :  1955  MRN:  [id number]   Visit Number:  [id number]  Primary Care Physician:  Allyson Fox APRN     Subjective     Chief complaint:     Left ankle wound    History of presenting illness:     Patient is a 67 y.o. male with past medical history significant for  that presented today for evaluation of left medial ankle. Reports wound has been present for several weeks 3-4 approximately. Unsure of how the area occurred just noticed the area. Denies any trauma to the area that he can recall. He has been applying triple antibiotic to the area. He denies history of diabetes. Is non-smoker. He is currently on Doxycycline started 2022 prescribed by PCP. Denies any prior vascular disease. He has some imaging completed ordered by PCP: Arterial US 2022: Unremarkable exam with no occlusive segments or segments of high-grade. Venous US: No DVT in the left lower extremity on today's exam.  X-ray foot 2022: Soft tissue defect in the posterior subcutaneous tissues at the level of the distal tibia, but no erosion of bone. X-ray ankle 2022: No evidence of osteomyelitis on today's exam.    Interval History:   10/05/2022: Patient seen in clinic today for follow-up to left medial ankle wound. He has had wound vac in place and wife and been performing changes at home. He has increase in swelling and erythema. He states he has been on his feet with work the past several days. Denies any fever or chills. Reports some increase in pain after being on his feet for several hours at work.     10/19/2022: Patient seen in clinic today for follow-up to left medial ankle ulcer. Wound unchanged, continues with yellow slough. Denies any fever or chills. He reports adding meghan to diet and increasing protein intake to help promote wound healing. Swelling appears to be controlled. No new issues or concerns reported.      10/26/2022: Patient seen in clinic today for follow-up to left medial ankle ulcer. Wound stable, there is slight decrease in depth. Continues with bioburden coverage. He is tolerating current wound treatment. Denies any fever or chills. He states he is consuming protein supplements and meghan. No new issues or concerns reported.    11/02/2022:Patient seen in clinic today for follow-up to left medial ankle ulcer. Wound stable, depth has slightly increased. Continues with bioburden coverage. He is tolerating current wound treatment.  He left compression wraps in place, there was reported odor upon removal of dressing. Denies any fever or chills. He states he is consuming protein supplements and meghan. No new issues or concerns reported.    11/09/2022: Patient seen in clinic today for follow-up to left medial ankle ulcer. Wound now with hypergranulation to the distal portion. Minimal pain reported. Denies any fever or chills. He had 2 layer compression wrap applied and had left in place for 4 days before having to remove. No new issues or concerns reported. Labs reviewed: Glucose 98, albumin 4.09, prealbumin 36.5, CRP < 0.30, WBC 14.83, sed rate 7.     11/16/2022: Patient seen in clinic today for follow-up to left medial ankle ulcer. Wound decrease in depth. There continues to be hypergranulation to distal portion.  Denies any fever or chills. He continues with meghan and high protein intake.. Swelling appears to be controlled. No new issues or concerns reported.  Tolerating current treatment with compression wraps.     11/23/2022: Patient seen in clinic today for follow-up to left medial ankle ulcer. Wound stable, depth continues to be deep on the proximal wound. Denies any fever or chills. He states wife smelt an order the last few days. No other issues or concerns reported at this time. He was able to wear compression wraps for full week. No concerns to wrap.     11/30/2022: Patient seen in clinic today for  follow-up to left medial ankle ulcer. Wound with hypergranulation to distal wound. Slough remains present. Denies any fever or chills. There is increase in erythema to periwound, this could be from  Increase in drainage versus acute infection. No other issues or concerns reported at this time. He was able to wear compression wraps for full week. No concerns to wrap.     12/06/2022: Patient seen in clinic today for follow-up to left medial ankle ulcer. Wound with hypergranulation to distal wound this continues julian present. Slough remains present slightly decreased from prior exam.  Denies any fever or chills. There is increase in erythema to periwound, this could be from  Increase in drainage versus acute infection. No other issues or concerns reported at this time. He was able to wear compression wraps for full week. No concerns to wrap.     12/14/2022:  Patient seen in clinic today for follow-up to left medial ankle ulcer. Wound base red and moist, scattered yellow Slough remains present. Denies any fever or chills. Erythema to periwound is present, no warmth. Reports mild pain to the site.  No other issues or concerns reported at this time. He was able to wear compression wraps for full week. No concerns to wrap.    12/21/2022: Patient seen in clinic today for follow-up to left medial ankle ulcer. Wound base red and moist, scattered yellow Slough remains present. Denies any fever or chills. Erythema to periwound has improved.  Reports mild pain to the site.  No other issues or concerns reported at this time. He was able to wear compression wraps for full week. No concerns to wrap.    12/28/2022: seen in clinic today for follow-up to left medial ankle ulcer. Wound base red and moist, scattered yellow Slough remains present. Denies any fever or chills.  Reports mild pain to the site.  No other issues or concerns reported at this time. He was able to wear compression wraps for full week. No concerns to wrap. He has  received his topical antibiotic powder.  ---------------------------------------------------------------------------------------------------------------------   Review of Systems   Constitutional: Negative for chills and fever.   HENT: Negative for congestion and rhinorrhea.    Respiratory: Negative for cough and shortness of breath.    Cardiovascular: Positive for leg swelling. Negative for chest pain.        Intermittent    Gastrointestinal: Negative for nausea and vomiting.   Musculoskeletal: Negative for back pain and gait problem.   Skin: Positive for wound.   Hematological: Does not bruise/bleed easily.      ---------------------------------------------------------------------------------------------------------------------   Past Medical History:   Diagnosis Date   • Arthritis    • Hypertension    • Kidney stone    • Sleep apnea      Past Surgical History:   Procedure Laterality Date   • ABDOMINAL SURGERY     • CYSTOLITHALOPAXY PERCUTANEOUS N/A 10/10/2022    Procedure: CYSTOLITHOLAPAXY WITH LASER;  Surgeon: Merrick Gutierrez MD;  Location: [facility];  Service: Urology;  Laterality: N/A;   • CYSTOSCOPY, URETEROSCOPY, RETROGRADE PYELOGRAM, STENT INSERTION Left 10/10/2022    Procedure: URETEROSCOPY RETROGRADE PYELOGRAM HOLMIUM LASER STENT INSERTION;  Surgeon: Merrick Gutierrez MD;  Location: [facility];  Service: Urology;  Laterality: Left;   • FRACTURE SURGERY     • GALLBLADDER SURGERY      15 or more years ago   • KIDNEY STONE SURGERY     • LEG SURGERY Left 2002    has dori in left leg     Family History   Problem Relation Age of Onset   • Hypertension Sister      Social History     Socioeconomic History   • Marital status:    Tobacco Use   • Smoking status: Never   • Smokeless tobacco: Never   Vaping Use   • Vaping Use: Never used   Substance and Sexual Activity   • Alcohol use: Never   • Drug use: Never   • Sexual activity: Defer     Partners: Female      ---------------------------------------------------------------------------------------------------------------------   Allergies:  Sulfa antibiotics  ---------------------------------------------------------------------------------------------------------------------  Objective     ---------------------------------------------------------------------------------------------------------------------   Vital Signs:  Temp:  [98.4 °F (36.9 °C)] 98.4 °F (36.9 °C)  Heart Rate:  [80] 80  Resp:  [18] 18  BP: (124)/(75) 124/75  No data found.  There were no vitals filed for this visit.  There is no height or weight on file to calculate BMI.  Wt Readings from Last 3 Encounters:   10/10/22 78.2 kg (172 lb 6.4 oz)   10/07/22 79.4 kg (175 lb)   09/28/22 79.4 kg (175 lb)       ---------------------------------------------------------------------------------------------------------------------   Physical Exam  Constitutional: Vital sign were reviewed (temperature, pulse, respiration, and blood pressure) and found to be within expected limits per nursing, general appearance was assessed and the patient was found to be in no distress and calm and comfortable appears  Skin: Temperature:normal turgor and temperatureColor: normal, no cyanosis, jaundice, pallor or bruising, Moisture: dry,Nails: thickened yellow toenails bed, Hair:thinning to lower extremities .  Physical Exam  Wound Assessment:  Location: Left, medial, ankle  Dressing Appearance: dressingappearance: clean  Closure: NA  Changes since last exam: no changes stable, from prior exam.  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  -Iodoform to base and secure with kerlix and ACE  -Will hold off on compression wraps this week due to concerns of infection. He is being treated with topical antibiotic, he will receive this week.   - Labs reviewed 11/09/2022: Glucose 98, albumin 4.09, prealbumin 36.5, CRP < 0.30, WBC 14.83, sed rate 7.   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  Time out was called prior to procedure.   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   Instrument(s) used: Curette 4mm  Anesthesia:After checking patient allergies,lidocaine topical 2% was administered to provide anesthesia. 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 The preservice record has been thoroughly documented and reviewed to ensure the circumstances resulting in wound failure were addressed appropriately and with the required interventions. The patient has been compliant with all recommendations of their comprehensive management to include control of underlying conditions that contributed to the ulcer, appropriate off-loading, and an optimized nutritional plan. Nutritional assessment and planning is based on labs of ALB 3.99, PREALB 18.3.  Adequate circulation/oxygenation to support tissue growth evidenced by an THOM of Right: 1.32, Left: 1.32  Patient's HbA1 NA not diabetic. There is no evidence of underlying osteomyelitis or cellulitis, and any potential nidus of infection or bacterial colonization has been controlled. The ulcer is clean and free of necrotic debris and exudate with a granular base. Ulcer is full (or partial) skin loss , not involving tendon, muscle, joint capsule, or exhibiting bone or sinus tracts with a clean granular bed. At this time, placement of a cellular and/or tissue-based therapy is recommended, because its dermal matrix should provide the  ideal environment to support cellular repopulation. The patient is a non-smoker or has refrained from systemic tobacco intake for at least 4 weeks during conservative wound care and prior to this procedure. Appropriate compression bandaging has been provided with diligent use of multilayer bandages, stockings (>20 mmHg) or velcro devices The addition of Skin Substitutes (Puraply, Apligraf) is reasonable and necessary for the treatment of this non healing ulceration, and may afford a healing advantage over dressings and conservative treatments that have proven insufficient to complete heal this patient.     Clinical Impression:Moderate Complexity    Follow-up: 1 week    MICKEY Brasher   [facility]  12/28/2022  0800

## 2023-01-04 ENCOUNTER — HOSPITAL ENCOUNTER (OUTPATIENT)
Dept: WOUND CARE | Facility: HOSPITAL | Age: 68
Discharge: HOME | End: 2023-01-04
Admitting: NURSE PRACTITIONER
Payer: COMMERCIAL

## 2023-01-04 VITALS
DIASTOLIC BLOOD PRESSURE: 83 MMHG | TEMPERATURE: 98.2 F | SYSTOLIC BLOOD PRESSURE: 137 MMHG | HEART RATE: 82 BPM | RESPIRATION RATE: 18 BRPM

## 2023-01-04 DIAGNOSIS — L97.322 NON-PRESSURE CHRONIC ULCER OF LEFT ANKLE WITH FAT LAYER EXPOSED: Primary | ICD-10-CM

## 2023-01-04 DIAGNOSIS — L97.325 NON-PRESSURE CHRONIC ULCER OF LEFT ANKLE WITH MUSCLE INVOLVEMENT WITHOUT EVIDENCE OF NECROSIS: ICD-10-CM

## 2023-01-04 RX ORDER — LIDOCAINE HYDROCHLORIDE 20 MG/ML
JELLY TOPICAL AS NEEDED
Status: DISCONTINUED | OUTPATIENT
Start: 2023-01-04 | End: 2023-01-05 | Stop reason: HOSPADM

## 2023-01-04 RX ORDER — CASTOR OIL AND BALSAM, PERU 788; 87 MG/G; MG/G
1 OINTMENT TOPICAL AS NEEDED
Status: CANCELLED | OUTPATIENT
Start: 2023-01-04

## 2023-01-04 RX ORDER — LIDOCAINE HYDROCHLORIDE 20 MG/ML
JELLY TOPICAL AS NEEDED
Status: CANCELLED
Start: 2023-01-04

## 2023-01-04 RX ORDER — SODIUM HYPOCHLORITE 1.25 MG/ML
1 SOLUTION TOPICAL AS NEEDED
Status: CANCELLED | OUTPATIENT
Start: 2023-01-04

## 2023-01-04 RX ORDER — SODIUM HYPOCHLORITE 2.5 MG/ML
1 SOLUTION TOPICAL AS NEEDED
Status: CANCELLED | OUTPATIENT
Start: 2023-01-04

## 2023-01-04 RX ADMIN — LIDOCAINE HYDROCHLORIDE 6 ML: 20 JELLY TOPICAL at 10:41

## 2023-01-04 NOTE — PROGRESS NOTES
Wound Clinic Note  Patient Identification:  Name:  Broderick Pereira  Age:  67 y.o.  Sex:  male  :  1955  MRN:  [id number]   Visit Number:  [id number]  Primary Care Physician:  Allyson Fox APRN     Subjective     Chief complaint:     Left ankle wound    History of presenting illness:     Patient is a 67 y.o. male with past medical history significant for  that presented today for evaluation of left medial ankle. Reports wound has been present for several weeks 3-4 approximately. Unsure of how the area occurred just noticed the area. Denies any trauma to the area that he can recall. He has been applying triple antibiotic to the area. He denies history of diabetes. Is non-smoker. He is currently on Doxycycline started 2022 prescribed by PCP. Denies any prior vascular disease. He has some imaging completed ordered by PCP: Arterial US 2022: Unremarkable exam with no occlusive segments or segments of high-grade. Venous US: No DVT in the left lower extremity on today's exam.  X-ray foot 2022: Soft tissue defect in the posterior subcutaneous tissues at the level of the distal tibia, but no erosion of bone. X-ray ankle 2022: No evidence of osteomyelitis on today's exam.    Interval History:   10/05/2022: Patient seen in clinic today for follow-up to left medial ankle wound. He has had wound vac in place and wife and been performing changes at home. He has increase in swelling and erythema. He states he has been on his feet with work the past several days. Denies any fever or chills. Reports some increase in pain after being on his feet for several hours at work.     10/19/2022: Patient seen in clinic today for follow-up to left medial ankle ulcer. Wound unchanged, continues with yellow slough. Denies any fever or chills. He reports adding meghan to diet and increasing protein intake to help promote wound healing. Swelling appears to be controlled. No new issues or concerns reported.      10/26/2022: Patient seen in clinic today for follow-up to left medial ankle ulcer. Wound stable, there is slight decrease in depth. Continues with bioburden coverage. He is tolerating current wound treatment. Denies any fever or chills. He states he is consuming protein supplements and meghan. No new issues or concerns reported.    11/02/2022:Patient seen in clinic today for follow-up to left medial ankle ulcer. Wound stable, depth has slightly increased. Continues with bioburden coverage. He is tolerating current wound treatment.  He left compression wraps in place, there was reported odor upon removal of dressing. Denies any fever or chills. He states he is consuming protein supplements and meghan. No new issues or concerns reported.    11/09/2022: Patient seen in clinic today for follow-up to left medial ankle ulcer. Wound now with hypergranulation to the distal portion. Minimal pain reported. Denies any fever or chills. He had 2 layer compression wrap applied and had left in place for 4 days before having to remove. No new issues or concerns reported. Labs reviewed: Glucose 98, albumin 4.09, prealbumin 36.5, CRP < 0.30, WBC 14.83, sed rate 7.     11/16/2022: Patient seen in clinic today for follow-up to left medial ankle ulcer. Wound decrease in depth. There continues to be hypergranulation to distal portion.  Denies any fever or chills. He continues with meghan and high protein intake.. Swelling appears to be controlled. No new issues or concerns reported.  Tolerating current treatment with compression wraps.     11/23/2022: Patient seen in clinic today for follow-up to left medial ankle ulcer. Wound stable, depth continues to be deep on the proximal wound. Denies any fever or chills. He states wife smelt an order the last few days. No other issues or concerns reported at this time. He was able to wear compression wraps for full week. No concerns to wrap.     11/30/2022: Patient seen in clinic today for  follow-up to left medial ankle ulcer. Wound with hypergranulation to distal wound. Slough remains present. Denies any fever or chills. There is increase in erythema to periwound, this could be from  Increase in drainage versus acute infection. No other issues or concerns reported at this time. He was able to wear compression wraps for full week. No concerns to wrap.     12/06/2022: Patient seen in clinic today for follow-up to left medial ankle ulcer. Wound with hypergranulation to distal wound this continues julian present. Slough remains present slightly decreased from prior exam.  Denies any fever or chills. There is increase in erythema to periwound, this could be from  Increase in drainage versus acute infection. No other issues or concerns reported at this time. He was able to wear compression wraps for full week. No concerns to wrap.     12/14/2022:  Patient seen in clinic today for follow-up to left medial ankle ulcer. Wound base red and moist, scattered yellow Slough remains present. Denies any fever or chills. Erythema to periwound is present, no warmth. Reports mild pain to the site.  No other issues or concerns reported at this time. He was able to wear compression wraps for full week. No concerns to wrap.    12/21/2022: Patient seen in clinic today for follow-up to left medial ankle ulcer. Wound base red and moist, scattered yellow Slough remains present. Denies any fever or chills. Erythema to periwound has improved.  Reports mild pain to the site.  No other issues or concerns reported at this time. He was able to wear compression wraps for full week. No concerns to wrap.    12/28/2022: seen in clinic today for follow-up to left medial ankle ulcer. Wound base red and moist, scattered yellow Slough remains present. Denies any fever or chills.  Reports mild pain to the site.  No other issues or concerns reported at this time. He was able to wear compression wraps for full week. No concerns to wrap. He has  received his topical antibiotic powder.    01/04/2023: seen in clinic today for follow-up to left medial ankle ulcer. Wound base red and moist, scattered yellow Slough remains present. Decreased in surface area. Denies any fever or chills.  Reports mild pain to the site.  No other issues or concerns reported at this time. He was able to wear compression wraps for full week. No concerns to wrap. He has received his topical antibiotic powder.  ---------------------------------------------------------------------------------------------------------------------   Review of Systems   Constitutional: Negative for chills and fever.   HENT: Negative for congestion and rhinorrhea.    Respiratory: Negative for cough and shortness of breath.    Cardiovascular: Positive for leg swelling. Negative for chest pain.        Intermittent    Gastrointestinal: Negative for nausea and vomiting.   Musculoskeletal: Negative for back pain and gait problem.   Skin: Positive for wound.   Hematological: Does not bruise/bleed easily.      ---------------------------------------------------------------------------------------------------------------------   Past Medical History:   Diagnosis Date   • Arthritis    • Hypertension    • Kidney stone    • Sleep apnea      Past Surgical History:   Procedure Laterality Date   • ABDOMINAL SURGERY     • CYSTOLITHALOPAXY PERCUTANEOUS N/A 10/10/2022    Procedure: CYSTOLITHOLAPAXY WITH LASER;  Surgeon: Merrick Gutierrez MD;  Location: [facility];  Service: Urology;  Laterality: N/A;   • CYSTOSCOPY, URETEROSCOPY, RETROGRADE PYELOGRAM, STENT INSERTION Left 10/10/2022    Procedure: URETEROSCOPY RETROGRADE PYELOGRAM HOLMIUM LASER STENT INSERTION;  Surgeon: Merrick Gutierrez MD;  Location: [facility];  Service: Urology;  Laterality: Left;   • FRACTURE SURGERY     • GALLBLADDER SURGERY      15 or more years ago   • KIDNEY STONE SURGERY     • LEG SURGERY Left 2002    has dori in left leg     Family  History   Problem Relation Age of Onset   • Hypertension Sister      Social History     Socioeconomic History   • Marital status:    Tobacco Use   • Smoking status: Never   • Smokeless tobacco: Never   Vaping Use   • Vaping Use: Never used   Substance and Sexual Activity   • Alcohol use: Never   • Drug use: Never   • Sexual activity: Defer     Partners: Female     ---------------------------------------------------------------------------------------------------------------------   Allergies:  Sulfa antibiotics  ---------------------------------------------------------------------------------------------------------------------  Objective     ---------------------------------------------------------------------------------------------------------------------   Vital Signs:  Temp:  [98.2 °F (36.8 °C)] 98.2 °F (36.8 °C)  Heart Rate:  [82] 82  Resp:  [18] 18  BP: (137)/(83) 137/83  No data found.  There were no vitals filed for this visit.  There is no height or weight on file to calculate BMI.  Wt Readings from Last 3 Encounters:   10/10/22 78.2 kg (172 lb 6.4 oz)   10/07/22 79.4 kg (175 lb)   09/28/22 79.4 kg (175 lb)       ---------------------------------------------------------------------------------------------------------------------   Physical Exam  Constitutional: Vital sign were reviewed (temperature, pulse, respiration, and blood pressure) and found to be within expected limits per nursing, general appearance was assessed and the patient was found to be in no distress and calm and comfortable appears  Skin: Temperature:normal turgor and temperatureColor: normal, no cyanosis, jaundice, pallor or bruising, Moisture: dry,Nails: thickened yellow toenails bed, Hair:thinning to lower extremities .  Physical Exam  Wound Assessment:  Location: Left, medial, ankle  Dressing Appearance: dressingappearance: clean  Closure: NA  Changes since last exam: surface area has diminished    Etiology and classification:  non-pressure chronic ulcer   Wound bed structures/characteristics: full-thickness (subcutaneous tissue is exposed in at least a portion of the wound)  Edges moist  Periwound characteristics: moist  Periwound Temperature: normal turgor and temperature  Drainage characteristics: moderate, serous   Perfusion characteristics: Pulses palpable, capillary refill adequate    Wound Goal (s):Closure, Epithelization, Free of infection and No further symptoms  Assessment & Plan      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  -Antibiotic powder to base, secure with kerlix and short stretch  -Will hold off on compression wraps this week due to concerns of infection. He is being treated with topical antibiotic, he will receive this week.   - Labs reviewed 11/09/2022: Glucose 98, albumin 4.09, prealbumin 36.5, CRP < 0.30, WBC 14.83, sed rate 7.   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  Time out was called prior to procedure.   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   Instrument(s) used: Curette 4mm  Anesthesia:After checking patient allergies,lidocaine topical 2% was administered to provide anesthesia. and 2% Injectable lidocaine was used  Tissue removed: subuctaneous, Percent Removed 80%  Culture or Biopsy: culture and sensitivity  Estimated Blood Loss: Moderate  Hemostasis Obtained: pressure and Surgicel     Clinical Impression:Moderate Complexity    Follow-up: 1 week    MICKEY Brasher   [facility]  01/04/2023  [id number]

## 2023-01-11 ENCOUNTER — HOSPITAL ENCOUNTER (OUTPATIENT)
Dept: WOUND CARE | Facility: HOSPITAL | Age: 68
Discharge: HOME | End: 2023-01-11
Admitting: NURSE PRACTITIONER
Payer: COMMERCIAL

## 2023-01-11 VITALS
SYSTOLIC BLOOD PRESSURE: 141 MMHG | DIASTOLIC BLOOD PRESSURE: 84 MMHG | HEART RATE: 85 BPM | RESPIRATION RATE: 18 BRPM | TEMPERATURE: 98.3 F

## 2023-01-11 DIAGNOSIS — L97.325 NON-PRESSURE CHRONIC ULCER OF LEFT ANKLE WITH MUSCLE INVOLVEMENT WITHOUT EVIDENCE OF NECROSIS: ICD-10-CM

## 2023-01-11 DIAGNOSIS — L97.322 NON-PRESSURE CHRONIC ULCER OF LEFT ANKLE WITH FAT LAYER EXPOSED: Primary | ICD-10-CM

## 2023-01-11 RX ORDER — CASTOR OIL AND BALSAM, PERU 788; 87 MG/G; MG/G
1 OINTMENT TOPICAL AS NEEDED
Status: CANCELLED | OUTPATIENT
Start: 2023-01-11

## 2023-01-11 RX ORDER — LIDOCAINE HYDROCHLORIDE 20 MG/ML
JELLY TOPICAL AS NEEDED
Status: CANCELLED
Start: 2023-01-11

## 2023-01-11 RX ORDER — SODIUM HYPOCHLORITE 1.25 MG/ML
1 SOLUTION TOPICAL AS NEEDED
Status: CANCELLED | OUTPATIENT
Start: 2023-01-11

## 2023-01-11 RX ORDER — LIDOCAINE HYDROCHLORIDE 20 MG/ML
JELLY TOPICAL AS NEEDED
Status: DISCONTINUED | OUTPATIENT
Start: 2023-01-11 | End: 2023-01-12 | Stop reason: HOSPADM

## 2023-01-11 RX ORDER — SODIUM HYPOCHLORITE 2.5 MG/ML
1 SOLUTION TOPICAL AS NEEDED
Status: CANCELLED | OUTPATIENT
Start: 2023-01-11

## 2023-01-11 RX ADMIN — LIDOCAINE HYDROCHLORIDE: 20 JELLY TOPICAL at 09:06

## 2023-01-11 NOTE — PROGRESS NOTES
Wound Clinic Note  Patient Identification:  Name:  Broderick Pereira  Age:  67 y.o.  Sex:  male  :  1955  MRN:  [id number]   Visit Number:  [id number]  Primary Care Physician:  Allyson Fox APRN     Subjective     Chief complaint:     Left ankle wound    History of presenting illness:     Patient is a 67 y.o. male with past medical history significant for  that presented today for evaluation of left medial ankle. Reports wound has been present for several weeks 3-4 approximately. Unsure of how the area occurred just noticed the area. Denies any trauma to the area that he can recall. He has been applying triple antibiotic to the area. He denies history of diabetes. Is non-smoker. He is currently on Doxycycline started 2022 prescribed by PCP. Denies any prior vascular disease. He has some imaging completed ordered by PCP: Arterial US 2022: Unremarkable exam with no occlusive segments or segments of high-grade. Venous US: No DVT in the left lower extremity on today's exam.  X-ray foot 2022: Soft tissue defect in the posterior subcutaneous tissues at the level of the distal tibia, but no erosion of bone. X-ray ankle 2022: No evidence of osteomyelitis on today's exam.    Interval History:   10/05/2022: Patient seen in clinic today for follow-up to left medial ankle wound. He has had wound vac in place and wife and been performing changes at home. He has increase in swelling and erythema. He states he has been on his feet with work the past several days. Denies any fever or chills. Reports some increase in pain after being on his feet for several hours at work.     10/19/2022: Patient seen in clinic today for follow-up to left medial ankle ulcer. Wound unchanged, continues with yellow slough. Denies any fever or chills. He reports adding meghan to diet and increasing protein intake to help promote wound healing. Swelling appears to be controlled. No new issues or concerns reported.      10/26/2022: Patient seen in clinic today for follow-up to left medial ankle ulcer. Wound stable, there is slight decrease in depth. Continues with bioburden coverage. He is tolerating current wound treatment. Denies any fever or chills. He states he is consuming protein supplements and meghan. No new issues or concerns reported.    11/02/2022:Patient seen in clinic today for follow-up to left medial ankle ulcer. Wound stable, depth has slightly increased. Continues with bioburden coverage. He is tolerating current wound treatment.  He left compression wraps in place, there was reported odor upon removal of dressing. Denies any fever or chills. He states he is consuming protein supplements and meghan. No new issues or concerns reported.    11/09/2022: Patient seen in clinic today for follow-up to left medial ankle ulcer. Wound now with hypergranulation to the distal portion. Minimal pain reported. Denies any fever or chills. He had 2 layer compression wrap applied and had left in place for 4 days before having to remove. No new issues or concerns reported. Labs reviewed: Glucose 98, albumin 4.09, prealbumin 36.5, CRP < 0.30, WBC 14.83, sed rate 7.     11/16/2022: Patient seen in clinic today for follow-up to left medial ankle ulcer. Wound decrease in depth. There continues to be hypergranulation to distal portion.  Denies any fever or chills. He continues with meghan and high protein intake.. Swelling appears to be controlled. No new issues or concerns reported.  Tolerating current treatment with compression wraps.     11/23/2022: Patient seen in clinic today for follow-up to left medial ankle ulcer. Wound stable, depth continues to be deep on the proximal wound. Denies any fever or chills. He states wife smelt an order the last few days. No other issues or concerns reported at this time. He was able to wear compression wraps for full week. No concerns to wrap.     11/30/2022: Patient seen in clinic today for  follow-up to left medial ankle ulcer. Wound with hypergranulation to distal wound. Slough remains present. Denies any fever or chills. There is increase in erythema to periwound, this could be from  Increase in drainage versus acute infection. No other issues or concerns reported at this time. He was able to wear compression wraps for full week. No concerns to wrap.     12/06/2022: Patient seen in clinic today for follow-up to left medial ankle ulcer. Wound with hypergranulation to distal wound this continues julian present. Slough remains present slightly decreased from prior exam.  Denies any fever or chills. There is increase in erythema to periwound, this could be from  Increase in drainage versus acute infection. No other issues or concerns reported at this time. He was able to wear compression wraps for full week. No concerns to wrap.     12/14/2022:  Patient seen in clinic today for follow-up to left medial ankle ulcer. Wound base red and moist, scattered yellow Slough remains present. Denies any fever or chills. Erythema to periwound is present, no warmth. Reports mild pain to the site.  No other issues or concerns reported at this time. He was able to wear compression wraps for full week. No concerns to wrap.    12/21/2022: Patient seen in clinic today for follow-up to left medial ankle ulcer. Wound base red and moist, scattered yellow Slough remains present. Denies any fever or chills. Erythema to periwound has improved.  Reports mild pain to the site.  No other issues or concerns reported at this time. He was able to wear compression wraps for full week. No concerns to wrap.    12/28/2022: seen in clinic today for follow-up to left medial ankle ulcer. Wound base red and moist, scattered yellow Slough remains present. Denies any fever or chills.  Reports mild pain to the site.  No other issues or concerns reported at this time. He was able to wear compression wraps for full week. No concerns to wrap. He has  received his topical antibiotic powder.    01/04/2023: seen in clinic today for follow-up to left medial ankle ulcer. Wound base red and moist, scattered yellow Slough remains present. Decreased in surface area. Denies any fever or chills.  Reports mild pain to the site.  No other issues or concerns reported at this time. He was able to wear compression wraps for full week. No concerns to wrap. He has received his topical antibiotic powder.    01/11/2023: seen in clinic today for follow-up to left medial ankle ulcer. Wound base red and moist, scattered yellow Slough remains present. Continues to improve. Denies any fever or chills.  Reports mild pain to the site.  No other issues or concerns reported at this time. He was able to wear compression wraps for full week. No concerns to wrap. Using topical antibiotic powder.   ---------------------------------------------------------------------------------------------------------------------   Review of Systems   Constitutional: Negative for chills and fever.   HENT: Negative for congestion and rhinorrhea.    Respiratory: Negative for cough and shortness of breath.    Cardiovascular: Positive for leg swelling. Negative for chest pain.        Intermittent    Gastrointestinal: Negative for nausea and vomiting.   Musculoskeletal: Negative for back pain and gait problem.   Skin: Positive for wound.   Hematological: Does not bruise/bleed easily.      ---------------------------------------------------------------------------------------------------------------------   Past Medical History:   Diagnosis Date   • Arthritis    • Hypertension    • Kidney stone    • Sleep apnea      Past Surgical History:   Procedure Laterality Date   • ABDOMINAL SURGERY     • CYSTOLITHALOPAXY PERCUTANEOUS N/A 10/10/2022    Procedure: CYSTOLITHOLAPAXY WITH LASER;  Surgeon: Merrick Gutierrez MD;  Location[facility];  Service: Urology;  Laterality: N/A;   • CYSTOSCOPY, URETEROSCOPY, RETROGRADE  PYELOGRAM, STENT INSERTION Left 10/10/2022    Procedure: URETEROSCOPY RETROGRADE PYELOGRAM HOLMIUM LASER STENT INSERTION;  Surgeon: Merrick Gutierrez MD;  Location: [facility];  Service: Urology;  Laterality: Left;   • FRACTURE SURGERY     • GALLBLADDER SURGERY      15 or more years ago   • KIDNEY STONE SURGERY     • LEG SURGERY Left 2002    has dori in left leg     Family History   Problem Relation Age of Onset   • Hypertension Sister      Social History     Socioeconomic History   • Marital status:    Tobacco Use   • Smoking status: Never   • Smokeless tobacco: Never   Vaping Use   • Vaping Use: Never used   Substance and Sexual Activity   • Alcohol use: Never   • Drug use: Never   • Sexual activity: Defer     Partners: Female     ---------------------------------------------------------------------------------------------------------------------   Allergies:  Sulfa antibiotics  ---------------------------------------------------------------------------------------------------------------------  Objective     ---------------------------------------------------------------------------------------------------------------------   Vital Signs:  Temp:  [98.3 °F (36.8 °C)] 98.3 °F (36.8 °C)  Heart Rate:  [85] 85  Resp:  [18] 18  BP: (141)/(84) 141/84  No data found.  There were no vitals filed for this visit.  There is no height or weight on file to calculate BMI.  Wt Readings from Last 3 Encounters:   10/10/22 78.2 kg (172 lb 6.4 oz)   10/07/22 79.4 kg (175 lb)   09/28/22 79.4 kg (175 lb)       ---------------------------------------------------------------------------------------------------------------------   Physical Exam  Constitutional: Vital sign were reviewed (temperature, pulse, respiration, and blood pressure) and found to be within expected limits per nursing, general appearance was assessed and the patient was found to be in no distress and calm and comfortable appears  Skin: Temperature:normal  turgor and temperatureColor: normal, no cyanosis, jaundice, pallor or bruising, Moisture: dry,Nails: thickened yellow toenails bed, Hair:thinning to lower extremities .  Physical Exam  Wound Assessment:  Location: Left, medial, ankle  Dressing Appearance: dressingappearance: clean  Closure: NA  Changes since last exam: surface area has diminished    Etiology and classification: non-pressure chronic ulcer   Wound bed structures/characteristics: full-thickness (subcutaneous tissue is exposed in at least a portion of the wound)  Edges moist  Periwound characteristics: moist  Periwound Temperature: normal turgor and temperature  Drainage characteristics: moderate, serous   Perfusion characteristics: Pulses palpable, capillary refill adequate    Wound Goal (s):Closure, Epithelization, Free of infection and No further symptoms  Assessment & Plan      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  -Antibiotic powder to base, secure with kerlix and short stretch  -Will hold off on compression wraps this week due to concerns of infection. He is being treated with topical antibiotic, he will receive this week.   - Labs reviewed 11/09/2022: Glucose 98, albumin 4.09, prealbumin 36.5, CRP < 0.30, WBC 14.83, sed rate 7.   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  Time out was called  prior to procedure.   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   Instrument(s) used: Curette 4mm  Anesthesia:After checking patient allergies,lidocaine topical 2% was administered to provide anesthesia. and 2% Injectable lidocaine was used  Tissue removed: subuctaneous, Percent Removed 80%  Culture or Biopsy: culture and sensitivity  Estimated Blood Loss: Moderate  Hemostasis Obtained: pressure and Surgicel     Clinical Impression:Moderate Complexity    Follow-up: 1 week    MICKEY Brasher   [facility]  01/11/2023  1102

## 2023-01-18 ENCOUNTER — HOSPITAL ENCOUNTER (OUTPATIENT)
Dept: WOUND CARE | Facility: HOSPITAL | Age: 68
Discharge: HOME | End: 2023-01-18
Admitting: NURSE PRACTITIONER
Payer: COMMERCIAL

## 2023-01-18 VITALS
HEART RATE: 76 BPM | TEMPERATURE: 98.7 F | RESPIRATION RATE: 17 BRPM | SYSTOLIC BLOOD PRESSURE: 136 MMHG | DIASTOLIC BLOOD PRESSURE: 72 MMHG

## 2023-01-18 DIAGNOSIS — L97.322 NON-PRESSURE CHRONIC ULCER OF LEFT ANKLE WITH FAT LAYER EXPOSED: Primary | ICD-10-CM

## 2023-01-18 DIAGNOSIS — L97.325 NON-PRESSURE CHRONIC ULCER OF LEFT ANKLE WITH MUSCLE INVOLVEMENT WITHOUT EVIDENCE OF NECROSIS: ICD-10-CM

## 2023-01-18 LAB
ALBUMIN SERPL-MCNC: 4.1 G/DL (ref 3.5–5.2)
ALBUMIN/GLOB SERPL: 1.4 G/DL
ALP SERPL-CCNC: 68 U/L (ref 39–117)
ALT SERPL W P-5'-P-CCNC: 18 U/L (ref 1–41)
ANION GAP SERPL CALCULATED.3IONS-SCNC: 10.2 MMOL/L (ref 5–15)
AST SERPL-CCNC: 20 U/L (ref 1–40)
BASOPHILS # BLD AUTO: 0.04 10*3/MM3 (ref 0–0.2)
BASOPHILS NFR BLD AUTO: 0.5 % (ref 0–1.5)
BILIRUB SERPL-MCNC: 0.6 MG/DL (ref 0–1.2)
BUN SERPL-MCNC: 18 MG/DL (ref 8–23)
BUN/CREAT SERPL: 25 (ref 7–25)
CALCIUM SPEC-SCNC: 9.3 MG/DL (ref 8.6–10.5)
CHLORIDE SERPL-SCNC: 104 MMOL/L (ref 98–107)
CO2 SERPL-SCNC: 25.8 MMOL/L (ref 22–29)
CREAT SERPL-MCNC: 0.72 MG/DL (ref 0.76–1.27)
CRP SERPL-MCNC: <0.3 MG/DL (ref 0–0.5)
DEPRECATED RDW RBC AUTO: 47.8 FL (ref 37–54)
EGFRCR SERPLBLD CKD-EPI 2021: 101.4 ML/MIN/1.73
EOSINOPHIL # BLD AUTO: 0.15 10*3/MM3 (ref 0–0.4)
EOSINOPHIL NFR BLD AUTO: 1.9 % (ref 0.3–6.2)
ERYTHROCYTE [DISTWIDTH] IN BLOOD BY AUTOMATED COUNT: 14 % (ref 12.3–15.4)
ERYTHROCYTE [SEDIMENTATION RATE] IN BLOOD: 6 MM/HR (ref 0–20)
GLOBULIN UR ELPH-MCNC: 2.9 GM/DL
GLUCOSE SERPL-MCNC: 100 MG/DL (ref 65–99)
HCT VFR BLD AUTO: 45.9 % (ref 37.5–51)
HGB BLD-MCNC: 14.9 G/DL (ref 13–17.7)
IMM GRANULOCYTES # BLD AUTO: 0.02 10*3/MM3 (ref 0–0.05)
IMM GRANULOCYTES NFR BLD AUTO: 0.3 % (ref 0–0.5)
LYMPHOCYTES # BLD AUTO: 1.42 10*3/MM3 (ref 0.7–3.1)
LYMPHOCYTES NFR BLD AUTO: 17.8 % (ref 19.6–45.3)
MCH RBC QN AUTO: 30.2 PG (ref 26.6–33)
MCHC RBC AUTO-ENTMCNC: 32.5 G/DL (ref 31.5–35.7)
MCV RBC AUTO: 93.1 FL (ref 79–97)
MONOCYTES # BLD AUTO: 0.54 10*3/MM3 (ref 0.1–0.9)
MONOCYTES NFR BLD AUTO: 6.8 % (ref 5–12)
NEUTROPHILS NFR BLD AUTO: 5.83 10*3/MM3 (ref 1.7–7)
NEUTROPHILS NFR BLD AUTO: 72.7 % (ref 42.7–76)
NRBC BLD AUTO-RTO: 0 /100 WBC (ref 0–0.2)
PLATELET # BLD AUTO: 189 10*3/MM3 (ref 140–450)
PMV BLD AUTO: 9.9 FL (ref 6–12)
POTASSIUM SERPL-SCNC: 4.1 MMOL/L (ref 3.5–5.2)
PREALB SERPL-MCNC: 38 MG/DL (ref 20–40)
PROT SERPL-MCNC: 7 G/DL (ref 6–8.5)
RBC # BLD AUTO: 4.93 10*6/MM3 (ref 4.14–5.8)
SODIUM SERPL-SCNC: 140 MMOL/L (ref 136–145)
WBC NRBC COR # BLD: 8 10*3/MM3 (ref 3.4–10.8)

## 2023-01-18 PROCEDURE — 85025 COMPLETE CBC W/AUTO DIFF WBC: CPT | Performed by: NURSE PRACTITIONER

## 2023-01-18 PROCEDURE — 84134 ASSAY OF PREALBUMIN: CPT | Performed by: NURSE PRACTITIONER

## 2023-01-18 PROCEDURE — 85652 RBC SED RATE AUTOMATED: CPT | Performed by: NURSE PRACTITIONER

## 2023-01-18 PROCEDURE — 80053 COMPREHEN METABOLIC PANEL: CPT | Performed by: NURSE PRACTITIONER

## 2023-01-18 PROCEDURE — 36415 COLL VENOUS BLD VENIPUNCTURE: CPT

## 2023-01-18 PROCEDURE — 86140 C-REACTIVE PROTEIN: CPT | Performed by: NURSE PRACTITIONER

## 2023-01-18 RX ORDER — SODIUM HYPOCHLORITE 2.5 MG/ML
1 SOLUTION TOPICAL AS NEEDED
OUTPATIENT
Start: 2023-01-18

## 2023-01-18 RX ORDER — LIDOCAINE HYDROCHLORIDE 20 MG/ML
JELLY TOPICAL AS NEEDED
Status: CANCELLED
Start: 2023-01-18

## 2023-01-18 RX ORDER — CASTOR OIL AND BALSAM, PERU 788; 87 MG/G; MG/G
1 OINTMENT TOPICAL AS NEEDED
OUTPATIENT
Start: 2023-01-18

## 2023-01-18 RX ORDER — SODIUM HYPOCHLORITE 1.25 MG/ML
1 SOLUTION TOPICAL AS NEEDED
OUTPATIENT
Start: 2023-01-18

## 2023-01-18 RX ORDER — LIDOCAINE HYDROCHLORIDE 20 MG/ML
JELLY TOPICAL AS NEEDED
Status: DISCONTINUED | OUTPATIENT
Start: 2023-01-18 | End: 2023-01-19 | Stop reason: HOSPADM

## 2023-01-18 RX ADMIN — LIDOCAINE HYDROCHLORIDE: 20 JELLY TOPICAL at 10:27

## 2023-01-18 NOTE — PROGRESS NOTES
Wound Clinic Note  Patient Identification:  Name:  Broderick Pereira  Age:  67 y.o.  Sex:  male  :  1955  MRN:  [id number]   Visit Number:  [id number]  Primary Care Physician:  Allyson Fox APRN     Subjective     Chief complaint:     Left ankle wound    History of presenting illness:     Patient is a 67 y.o. male with past medical history significant for  that presented today for evaluation of left medial ankle. Reports wound has been present for several weeks 3-4 approximately. Unsure of how the area occurred just noticed the area. Denies any trauma to the area that he can recall. He has been applying triple antibiotic to the area. He denies history of diabetes. Is non-smoker. He is currently on Doxycycline started 2022 prescribed by PCP. Denies any prior vascular disease. He has some imaging completed ordered by PCP: Arterial US 2022: Unremarkable exam with no occlusive segments or segments of high-grade. Venous US: No DVT in the left lower extremity on today's exam.  X-ray foot 2022: Soft tissue defect in the posterior subcutaneous tissues at the level of the distal tibia, but no erosion of bone. X-ray ankle 2022: No evidence of osteomyelitis on today's exam.    Interval History:   10/05/2022: Patient seen in clinic today for follow-up to left medial ankle wound. He has had wound vac in place and wife and been performing changes at home. He has increase in swelling and erythema. He states he has been on his feet with work the past several days. Denies any fever or chills. Reports some increase in pain after being on his feet for several hours at work.     10/19/2022: Patient seen in clinic today for follow-up to left medial ankle ulcer. Wound unchanged, continues with yellow slough. Denies any fever or chills. He reports adding meghan to diet and increasing protein intake to help promote wound healing. Swelling appears to be controlled. No new issues or concerns reported.      10/26/2022: Patient seen in clinic today for follow-up to left medial ankle ulcer. Wound stable, there is slight decrease in depth. Continues with bioburden coverage. He is tolerating current wound treatment. Denies any fever or chills. He states he is consuming protein supplements and meghan. No new issues or concerns reported.    11/02/2022:Patient seen in clinic today for follow-up to left medial ankle ulcer. Wound stable, depth has slightly increased. Continues with bioburden coverage. He is tolerating current wound treatment.  He left compression wraps in place, there was reported odor upon removal of dressing. Denies any fever or chills. He states he is consuming protein supplements and meghan. No new issues or concerns reported.    11/09/2022: Patient seen in clinic today for follow-up to left medial ankle ulcer. Wound now with hypergranulation to the distal portion. Minimal pain reported. Denies any fever or chills. He had 2 layer compression wrap applied and had left in place for 4 days before having to remove. No new issues or concerns reported. Labs reviewed: Glucose 98, albumin 4.09, prealbumin 36.5, CRP < 0.30, WBC 14.83, sed rate 7.     11/16/2022: Patient seen in clinic today for follow-up to left medial ankle ulcer. Wound decrease in depth. There continues to be hypergranulation to distal portion.  Denies any fever or chills. He continues with meghan and high protein intake.. Swelling appears to be controlled. No new issues or concerns reported.  Tolerating current treatment with compression wraps.     11/23/2022: Patient seen in clinic today for follow-up to left medial ankle ulcer. Wound stable, depth continues to be deep on the proximal wound. Denies any fever or chills. He states wife smelt an order the last few days. No other issues or concerns reported at this time. He was able to wear compression wraps for full week. No concerns to wrap.     11/30/2022: Patient seen in clinic today for  follow-up to left medial ankle ulcer. Wound with hypergranulation to distal wound. Slough remains present. Denies any fever or chills. There is increase in erythema to periwound, this could be from  Increase in drainage versus acute infection. No other issues or concerns reported at this time. He was able to wear compression wraps for full week. No concerns to wrap.     12/06/2022: Patient seen in clinic today for follow-up to left medial ankle ulcer. Wound with hypergranulation to distal wound this continues julian present. Slough remains present slightly decreased from prior exam.  Denies any fever or chills. There is increase in erythema to periwound, this could be from  Increase in drainage versus acute infection. No other issues or concerns reported at this time. He was able to wear compression wraps for full week. No concerns to wrap.     12/14/2022:  Patient seen in clinic today for follow-up to left medial ankle ulcer. Wound base red and moist, scattered yellow Slough remains present. Denies any fever or chills. Erythema to periwound is present, no warmth. Reports mild pain to the site.  No other issues or concerns reported at this time. He was able to wear compression wraps for full week. No concerns to wrap.    12/21/2022: Patient seen in clinic today for follow-up to left medial ankle ulcer. Wound base red and moist, scattered yellow Slough remains present. Denies any fever or chills. Erythema to periwound has improved.  Reports mild pain to the site.  No other issues or concerns reported at this time. He was able to wear compression wraps for full week. No concerns to wrap.    12/28/2022: seen in clinic today for follow-up to left medial ankle ulcer. Wound base red and moist, scattered yellow Slough remains present. Denies any fever or chills.  Reports mild pain to the site.  No other issues or concerns reported at this time. He was able to wear compression wraps for full week. No concerns to wrap. He has  received his topical antibiotic powder.    01/04/2023: seen in clinic today for follow-up to left medial ankle ulcer. Wound base red and moist, scattered yellow Slough remains present. Decreased in surface area. Denies any fever or chills.  Reports mild pain to the site.  No other issues or concerns reported at this time. He was able to wear compression wraps for full week. No concerns to wrap. He has received his topical antibiotic powder.    01/11/2023: seen in clinic today for follow-up to left medial ankle ulcer. Wound base red and moist, scattered yellow Slough remains present. Continues to improve. Denies any fever or chills.  Reports mild pain to the site.  No other issues or concerns reported at this time. He was able to wear compression wraps for full week. No concerns to wrap. Using topical antibiotic powder.     01/18/2023: seen in clinic today for follow-up to left medial ankle ulcer. Wound base red and moist, there is some bioburden present to base. Decreased in surface area. Denies any fever or chills.  Reports mild pain to the site.  No other issues or concerns reported at this time. He was able to wear compression wraps for full week. No concerns to wrap. He has received his topical antibiotic powder.  ---------------------------------------------------------------------------------------------------------------------   Review of Systems   Constitutional: Negative for chills and fever.   HENT: Negative for congestion and rhinorrhea.    Respiratory: Negative for cough and shortness of breath.    Cardiovascular: Positive for leg swelling. Negative for chest pain.        Intermittent    Gastrointestinal: Negative for nausea and vomiting.   Musculoskeletal: Negative for back pain and gait problem.   Skin: Positive for wound.   Hematological: Does not bruise/bleed easily.      ---------------------------------------------------------------------------------------------------------------------   Past  Medical History:   Diagnosis Date   • Arthritis    • Hypertension    • Kidney stone    • Sleep apnea      Past Surgical History:   Procedure Laterality Date   • ABDOMINAL SURGERY     • CYSTOLITHALOPAXY PERCUTANEOUS N/A 10/10/2022    Procedure: CYSTOLITHOLAPAXY WITH LASER;  Surgeon: Merrick Gutierrez MD;  Location: [facility];  Service: Urology;  Laterality: N/A;   • CYSTOSCOPY, URETEROSCOPY, RETROGRADE PYELOGRAM, STENT INSERTION Left 10/10/2022    Procedure: URETEROSCOPY RETROGRADE PYELOGRAM HOLMIUM LASER STENT INSERTION;  Surgeon: Merrick Gutierrez MD;  Location: [facility];  Service: Urology;  Laterality: Left;   • FRACTURE SURGERY     • GALLBLADDER SURGERY      15 or more years ago   • KIDNEY STONE SURGERY     • LEG SURGERY Left 2002    has dori in left leg     Family History   Problem Relation Age of Onset   • Hypertension Sister      Social History     Socioeconomic History   • Marital status:    Tobacco Use   • Smoking status: Never   • Smokeless tobacco: Never   Vaping Use   • Vaping Use: Never used   Substance and Sexual Activity   • Alcohol use: Never   • Drug use: Never   • Sexual activity: Defer     Partners: Female     ---------------------------------------------------------------------------------------------------------------------   Allergies:  Sulfa antibiotics  ---------------------------------------------------------------------------------------------------------------------  Objective     ---------------------------------------------------------------------------------------------------------------------   Vital Signs:  Temp:  [98.7 °F (37.1 °C)] 98.7 °F (37.1 °C)  Heart Rate:  [76] 76  Resp:  [17] 17  BP: (136)/(72) 136/72  No data found.  There were no vitals filed for this visit.  There is no height or weight on file to calculate BMI.  Wt Readings from Last 3 Encounters:   10/10/22 78.2 kg (172 lb 6.4 oz)   10/07/22 79.4 kg (175 lb)   09/28/22 79.4 kg (175 lb)        ---------------------------------------------------------------------------------------------------------------------   Physical Exam  Constitutional: Vital sign were reviewed (temperature, pulse, respiration, and blood pressure) and found to be within expected limits per nursing, general appearance was assessed and the patient was found to be in no distress and calm and comfortable appears  Skin: Temperature:normal turgor and temperatureColor: normal, no cyanosis, jaundice, pallor or bruising, Moisture: dry,Nails: thickened yellow toenails bed, Hair:thinning to lower extremities .  Physical Exam  Wound Assessment:     01/18/23 1026   Wound 09/28/22 0837 Left anterior ankle   Placement Date/Time: 09/28/22 0837   Side: Left  Orientation: medial Location: ankle   Wound Image    Dressing Appearance intact;moist drainage   Closure Adhesive bandage   Base Moist;pink;yellow;red, full-thickness   Periwound blanchable;dry;redness   Periwound Temperature warm   Periwound Skin Turgor firm   Edges callused   Wound Length (cm) 0.5 cm   Wound Width (cm) 0.3 cm   Wound Depth (cm) 0.2 cm   Wound Surface Area (cm^2) 0.15 cm^2   Wound Volume (cm^3) 0.03 cm^3   Drainage Characteristics/Odor serosanguineous   Drainage Amount moderate   Care, Wound cleansed with;sterile normal saline;debrided  (antibiotic powder)   Dressing Care dressing applied  (telfa; kerlix; short stretch)       Wound Goal (s):Closure, Epithelization, Free of infection and No further symptoms  Assessment & Plan      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  -Antibiotic powder to base, secure with kerlix and short stretch. This is patient supplied  -Will measure for farrow wraps next weeek  - Labs reviewed 11/09/2022: Glucose 98, albumin 4.09, prealbumin 36.5, CRP < 0.30, WBC 14.83, sed rate 7.   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  Time out was called prior to procedure.   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   Instrument(s) used: Curette 4mm  Anesthesia:After checking patient allergies,lidocaine topical 2% was administered to provide anesthesia. and 2% Injectable lidocaine was used  Tissue removed: subuctaneous, Percent Removed 80%  Culture or Biopsy: culture and sensitivity  Estimated Blood Loss: Moderate  Hemostasis Obtained: pressure and Surgicel     Clinical Impression:Moderate Complexity    Follow-up: 1 week    MICKEY Brasher   [facility]  01/18/2023  [id number]

## 2023-01-24 ENCOUNTER — TELEPHONE (OUTPATIENT)
Dept: UROLOGY | Facility: CLINIC | Age: 68
End: 2023-01-24

## 2023-01-24 ENCOUNTER — OFFICE VISIT (OUTPATIENT)
Dept: UROLOGY | Facility: CLINIC | Age: 68
End: 2023-01-24
Payer: COMMERCIAL

## 2023-01-24 VITALS
DIASTOLIC BLOOD PRESSURE: 75 MMHG | SYSTOLIC BLOOD PRESSURE: 135 MMHG | HEIGHT: 68 IN | BODY MASS INDEX: 26.07 KG/M2 | WEIGHT: 172 LBS

## 2023-01-24 DIAGNOSIS — N48.6 PEYRONIE DISEASE: ICD-10-CM

## 2023-01-24 DIAGNOSIS — N20.1 LEFT URETERAL STONE: Primary | ICD-10-CM

## 2023-01-24 LAB
BILIRUB BLD-MCNC: NEGATIVE MG/DL
CLARITY, POC: ABNORMAL
COLOR UR: YELLOW
EXPIRATION DATE: ABNORMAL
GLUCOSE UR STRIP-MCNC: NEGATIVE MG/DL
KETONES UR QL: NEGATIVE
LEUKOCYTE EST, POC: NEGATIVE
Lab: ABNORMAL
NITRITE UR-MCNC: NEGATIVE MG/ML
PH UR: 6 [PH] (ref 5–8)
PROT UR STRIP-MCNC: NEGATIVE MG/DL
RBC # UR STRIP: ABNORMAL /UL
SP GR UR: 1.02 (ref 1–1.03)
UROBILINOGEN UR QL: NORMAL

## 2023-01-24 PROCEDURE — 99213 OFFICE O/P EST LOW 20 MIN: CPT | Performed by: UROLOGY

## 2023-01-24 PROCEDURE — 81003 URINALYSIS AUTO W/O SCOPE: CPT | Performed by: UROLOGY

## 2023-01-24 NOTE — TELEPHONE ENCOUNTER
Spoke with Pt wife and let her know that Dr. Gutierrez wants him to have the ultra sound done now.

## 2023-01-24 NOTE — PROGRESS NOTES
Chief Complaint:      Chief Complaint   Patient presents with   • Nephrolithiasis     FOLLOW UP       HPI:   67 y.o. male there from a kidney stone.  He is doing great he has upper middle and lower pole calyceal calculi.  He has a new penoscrotal Peyronie's disease with a thickened area at the base I will check an ultrasound of his kidneys to rule out obstruction I will recheck him in 12 months pending the results of his ultrasonic examination we discussed Peyronie's at length.    Past Medical History:     Past Medical History:   Diagnosis Date   • Arthritis    • Hypertension    • Kidney stone    • Sleep apnea        Current Meds:     Current Outpatient Medications   Medication Sig Dispense Refill   • amLODIPine (NORVASC) 10 MG tablet      • amLODIPine (NORVASC) 5 MG tablet Take 1 tablet by mouth Daily.     • amoxicillin (AMOXIL) 875 MG tablet Take 875 mg by mouth 2 (Two) Times a Day. for 10 days     • cloNIDine (CATAPRES) 0.2 MG tablet Take  by mouth.     • doxazosin (CARDURA) 2 MG tablet Take 2 mg by mouth Daily.     • fluconazole (DIFLUCAN) 100 MG tablet TAKE 1 TABLET BY MOUTH EVERY DAY FOR 14 DAYS     • HYDROcodone-acetaminophen (NORCO)  MG per tablet Take 1 tablet by mouth Every 4 (Four) Hours As Needed for Moderate Pain (Pain). 16 tablet 0   • multivitamin with minerals tablet tablet Take 1 tablet by mouth Daily.     • Nutritional Supplements (Dread) powder Take 1 package by mouth 2 (Two) Times a Day. 545 g 5     No current facility-administered medications for this visit.        Allergies:      Allergies   Allergen Reactions   • Sulfa Antibiotics Rash        Past Surgical History:     Past Surgical History:   Procedure Laterality Date   • ABDOMINAL SURGERY     • CYSTOLITHALOPAXY PERCUTANEOUS N/A 10/10/2022    Procedure: CYSTOLITHOLAPAXY WITH LASER;  Surgeon: Merrick Gutierrez MD;  Location: [facility];  Service: Urology;  Laterality: N/A;   • CYSTOSCOPY, URETEROSCOPY, RETROGRADE PYELOGRAM, STENT  INSERTION Left 10/10/2022    Procedure: URETEROSCOPY RETROGRADE PYELOGRAM HOLMIUM LASER STENT INSERTION;  Surgeon: Merrick Gutierrez MD;  Location: [facility];  Service: Urology;  Laterality: Left;   • FRACTURE SURGERY     • GALLBLADDER SURGERY      15 or more years ago   • KIDNEY STONE SURGERY     • LEG SURGERY Left 2002    has dori in left leg       Social History:     Social History     Socioeconomic History   • Marital status:    Tobacco Use   • Smoking status: Never   • Smokeless tobacco: Never   Vaping Use   • Vaping Use: Never used   Substance and Sexual Activity   • Alcohol use: Never   • Drug use: Never   • Sexual activity: Defer     Partners: Female       Family History:     Family History   Problem Relation Age of Onset   • Hypertension Sister        Review of Systems:     Review of Systems   Constitutional: Negative.    HENT: Negative.    Eyes: Negative.    Respiratory: Negative.    Cardiovascular: Negative.    Gastrointestinal: Negative.    Endocrine: Negative.    Musculoskeletal: Negative.    Allergic/Immunologic: Negative.    Neurological: Negative.    Hematological: Negative.    Psychiatric/Behavioral: Negative.        Physical Exam:     Physical Exam  Vitals and nursing note reviewed.   Constitutional:       Appearance: He is well-developed.   HENT:      Head: Normocephalic and atraumatic.   Eyes:      Conjunctiva/sclera: Conjunctivae normal.      Pupils: Pupils are equal, round, and reactive to light.   Cardiovascular:      Rate and Rhythm: Normal rate and regular rhythm.      Heart sounds: Normal heart sounds.   Pulmonary:      Effort: Pulmonary effort is normal.      Breath sounds: Normal breath sounds.   Abdominal:      General: Bowel sounds are normal.      Palpations: Abdomen is soft.   Genitourinary:     Comments: Palpable plaque at penoscrotal junction  Musculoskeletal:         General: Normal range of motion.      Cervical back: Normal range of motion.   Skin:     General: Skin  is warm and dry.   Neurological:      Mental Status: He is alert and oriented to person, place, and time.      Deep Tendon Reflexes: Reflexes are normal and symmetric.   Psychiatric:         Behavior: Behavior normal.         Thought Content: Thought content normal.         Judgment: Judgment normal.         I have reviewed the following portions of the patient's history: Allergies, current medications, past family history, past medical history, past social history, past surgical history, problem list, and ROS and confirm it is accurate.    Recent Image (CT and/or KUB):      CT Abdomen and Pelvis: Results for orders placed in visit on 01/21/14    CT ABDOMEN PELVIS WITH AND WITHOUT CONTRAST    Narrative  EXAM:  CT ABDOMEN WITHOUT CONTRAST AND CT PELVIS WITHOUT CONTRAST  CT ABDOMEN WITH CONTRAST AND CT PELVIS WITH CONTRAST    REASON: 58-year-old with hematuria    PROCEDURE:    Axial images were acquired from the lung bases through the  pubic symphysis initially without any IV contrast. Images were then  acquired from the lung bases through the pubic symphysis during IV  contrast and then on a delayed basis.    FINDINGS: The unenhanced study shows nonobstructing stones within both  kidneys.  There is, however, obstructive uropathy on the left. There is  hydronephrosis and hydroureter. Several stones are present in the left  ureter. The largest stone is just at the left UVJ. It measures 7.4 mm.  More proximal stone is 6.3 mm.    I do not see any right-sided ureteral stones.    The bladder wall is slightly thickened and the prostate gland is  enlarged.    The contrasted study shows no solid renal mass.    The liver and spleen are homogeneous with the exception of 2 low  attenuation lesions in the liver, probably hepatic cysts.    No free fluid or walled-off fluid collections are seen.    IMPRESSION-    Obstructive uropathy on the left due to several distal  left ureteral stones. The largest is at the left  UVJ.    - ARELI Meza Radiologist- HILARIO CLEMENTS  Releasing Radiologist- HILARIO CLEMENTS  Released Date Time- 01/21/14 1126  ------------------------------------------------------------------------------       CT Stone Protocol: No results found for this or any previous visit.       KUB: Results for orders placed during the hospital encounter of 10/24/22    XR Abdomen KUB    Narrative  EXAM:  XR Abdomen, 1 View    EXAM DATE:  10/24/2022 2:59 PM    CLINICAL HISTORY:  stent'; N20.1-Calculus of ureter    TECHNIQUE:  Frontal supine view of the abdomen/pelvis.    COMPARISON:  No relevant prior studies available.    FINDINGS:  Gastrointestinal tract:  Unremarkable.  No dilation.  Organs:  Bilateral kidney stones are present.  No stones along the  left ureteral stent.  Bones/joints:  Degenerative changes lumbar spine.  Tubes, lines and devices:  Left-sided ureteral stent is noted.    Impression  1.  No radiographic evidence of stones along the left ureteral stent.  2.  Bilateral kidney stones are noted.    This report was finalized on 10/25/2022 8:46 AM by Dr. Asad Oswald MD.       Labs (past 3 months):      Hospital Outpatient Visit on 01/18/2023   Component Date Value Ref Range Status   • Glucose 01/18/2023 100 (H)  65 - 99 mg/dL Final   • BUN 01/18/2023 18  8 - 23 mg/dL Final   • Creatinine 01/18/2023 0.72 (L)  0.76 - 1.27 mg/dL Final   • Sodium 01/18/2023 140  136 - 145 mmol/L Final   • Potassium 01/18/2023 4.1  3.5 - 5.2 mmol/L Final   • Chloride 01/18/2023 104  98 - 107 mmol/L Final   • CO2 01/18/2023 25.8  22.0 - 29.0 mmol/L Final   • Calcium 01/18/2023 9.3  8.6 - 10.5 mg/dL Final   • Total Protein 01/18/2023 7.0  6.0 - 8.5 g/dL Final   • Albumin 01/18/2023 4.1  3.5 - 5.2 g/dL Final   • ALT (SGPT) 01/18/2023 18  1 - 41 U/L Final   • AST (SGOT) 01/18/2023 20  1 - 40 U/L Final   • Alkaline Phosphatase 01/18/2023 68  39 - 117 U/L Final   • Total Bilirubin 01/18/2023 0.6  0.0 - 1.2 mg/dL  Final   • Globulin 01/18/2023 2.9  gm/dL Final   • A/G Ratio 01/18/2023 1.4  g/dL Final   • BUN/Creatinine Ratio 01/18/2023 25.0  7.0 - 25.0 Final   • Anion Gap 01/18/2023 10.2  5.0 - 15.0 mmol/L Final   • eGFR 01/18/2023 101.4  >60.0 mL/min/1.73 Final   • C-Reactive Protein 01/18/2023 <0.30  0.00 - 0.50 mg/dL Final   • Sed Rate 01/18/2023 6  0 - 20 mm/hr Final   • Prealbumin 01/18/2023 38.0  20.0 - 40.0 mg/dL Final   • WBC 01/18/2023 8.00  3.40 - 10.80 10*3/mm3 Final   • RBC 01/18/2023 4.93  4.14 - 5.80 10*6/mm3 Final   • Hemoglobin 01/18/2023 14.9  13.0 - 17.7 g/dL Final   • Hematocrit 01/18/2023 45.9  37.5 - 51.0 % Final   • MCV 01/18/2023 93.1  79.0 - 97.0 fL Final   • MCH 01/18/2023 30.2  26.6 - 33.0 pg Final   • MCHC 01/18/2023 32.5  31.5 - 35.7 g/dL Final   • RDW 01/18/2023 14.0  12.3 - 15.4 % Final   • RDW-SD 01/18/2023 47.8  37.0 - 54.0 fl Final   • MPV 01/18/2023 9.9  6.0 - 12.0 fL Final   • Platelets 01/18/2023 189  140 - 450 10*3/mm3 Final   • Neutrophil % 01/18/2023 72.7  42.7 - 76.0 % Final   • Lymphocyte % 01/18/2023 17.8 (L)  19.6 - 45.3 % Final   • Monocyte % 01/18/2023 6.8  5.0 - 12.0 % Final   • Eosinophil % 01/18/2023 1.9  0.3 - 6.2 % Final   • Basophil % 01/18/2023 0.5  0.0 - 1.5 % Final   • Immature Grans % 01/18/2023 0.3  0.0 - 0.5 % Final   • Neutrophils, Absolute 01/18/2023 5.83  1.70 - 7.00 10*3/mm3 Final   • Lymphocytes, Absolute 01/18/2023 1.42  0.70 - 3.10 10*3/mm3 Final   • Monocytes, Absolute 01/18/2023 0.54  0.10 - 0.90 10*3/mm3 Final   • Eosinophils, Absolute 01/18/2023 0.15  0.00 - 0.40 10*3/mm3 Final   • Basophils, Absolute 01/18/2023 0.04  0.00 - 0.20 10*3/mm3 Final   • Immature Grans, Absolute 01/18/2023 0.02  0.00 - 0.05 10*3/mm3 Final   • nRBC 01/18/2023 0.0  0.0 - 0.2 /100 WBC Final   Hospital Outpatient Visit on 12/14/2022   Component Date Value Ref Range Status   • Glucose 12/14/2022 103 (H)  65 - 99 mg/dL Final   • BUN 12/14/2022 19  8 - 23 mg/dL Final   • Creatinine  12/14/2022 0.69 (L)  0.76 - 1.27 mg/dL Final   • Sodium 12/14/2022 141  136 - 145 mmol/L Final   • Potassium 12/14/2022 3.7  3.5 - 5.2 mmol/L Final   • Chloride 12/14/2022 107  98 - 107 mmol/L Final   • CO2 12/14/2022 22.7  22.0 - 29.0 mmol/L Final   • Calcium 12/14/2022 9.3  8.6 - 10.5 mg/dL Final   • Total Protein 12/14/2022 6.8  6.0 - 8.5 g/dL Final   • Albumin 12/14/2022 3.62  3.50 - 5.20 g/dL Final   • ALT (SGPT) 12/14/2022 22  1 - 41 U/L Final   • AST (SGOT) 12/14/2022 20  1 - 40 U/L Final   • Alkaline Phosphatase 12/14/2022 71  39 - 117 U/L Final   • Total Bilirubin 12/14/2022 0.7  0.0 - 1.2 mg/dL Final   • Globulin 12/14/2022 3.2  gm/dL Final   • A/G Ratio 12/14/2022 1.1  g/dL Final   • BUN/Creatinine Ratio 12/14/2022 27.5 (H)  7.0 - 25.0 Final   • Anion Gap 12/14/2022 11.3  5.0 - 15.0 mmol/L Final   • eGFR 12/14/2022 102.7  >60.0 mL/min/1.73 Final    National Kidney Foundation and American Society of Nephrology (ASN) Task Force recommended calculation based on the Chronic Kidney Disease Epidemiology Collaboration (CKD-EPI) equation refit without adjustment for race.   • C-Reactive Protein 12/14/2022 0.67 (H)  0.00 - 0.50 mg/dL Final   • Sed Rate 12/14/2022 16  0 - 20 mm/hr Final   • Hemoglobin A1C 12/14/2022 5.60  4.80 - 5.60 % Final   • Prealbumin 12/14/2022 30.1  20.0 - 40.0 mg/dL Final   • WBC 12/14/2022 10.91 (H)  3.40 - 10.80 10*3/mm3 Final   • RBC 12/14/2022 4.72  4.14 - 5.80 10*6/mm3 Final   • Hemoglobin 12/14/2022 14.3  13.0 - 17.7 g/dL Final   • Hematocrit 12/14/2022 42.9  37.5 - 51.0 % Final   • MCV 12/14/2022 90.9  79.0 - 97.0 fL Final   • MCH 12/14/2022 30.3  26.6 - 33.0 pg Final   • MCHC 12/14/2022 33.3  31.5 - 35.7 g/dL Final   • RDW 12/14/2022 13.7  12.3 - 15.4 % Final   • RDW-SD 12/14/2022 46.5  37.0 - 54.0 fl Final   • MPV 12/14/2022 9.6  6.0 - 12.0 fL Final   • Platelets 12/14/2022 258  140 - 450 10*3/mm3 Final   • Neutrophil % 12/14/2022 74.0  42.7 - 76.0 % Final   • Lymphocyte %  12/14/2022 16.6 (L)  19.6 - 45.3 % Final   • Monocyte % 12/14/2022 6.7  5.0 - 12.0 % Final   • Eosinophil % 12/14/2022 1.9  0.3 - 6.2 % Final   • Basophil % 12/14/2022 0.3  0.0 - 1.5 % Final   • Immature Grans % 12/14/2022 0.5  0.0 - 0.5 % Final   • Neutrophils, Absolute 12/14/2022 8.08 (H)  1.70 - 7.00 10*3/mm3 Final   • Lymphocytes, Absolute 12/14/2022 1.81  0.70 - 3.10 10*3/mm3 Final   • Monocytes, Absolute 12/14/2022 0.73  0.10 - 0.90 10*3/mm3 Final   • Eosinophils, Absolute 12/14/2022 0.21  0.00 - 0.40 10*3/mm3 Final   • Basophils, Absolute 12/14/2022 0.03  0.00 - 0.20 10*3/mm3 Final   • Immature Grans, Absolute 12/14/2022 0.05  0.00 - 0.05 10*3/mm3 Final   • nRBC 12/14/2022 0.0  0.0 - 0.2 /100 WBC Final   • Wound Culture 12/14/2022 Light growth (2+) Corynebacterium species (A)   Final      No definitive guidelines. All are susceptible to vancomycin. Resistance to penicillins & cephalosporins does occur.   • Gram Stain 12/14/2022 Rare (1+) WBCs seen   Final   • Gram Stain 12/14/2022 Rare (1+) Gram positive cocci in pairs   Final   Hospital Outpatient Visit on 11/02/2022   Component Date Value Ref Range Status   • Glucose 11/02/2022 98  65 - 99 mg/dL Final   • BUN 11/02/2022 24 (H)  8 - 23 mg/dL Final   • Creatinine 11/02/2022 0.73 (L)  0.76 - 1.27 mg/dL Final   • Sodium 11/02/2022 143  136 - 145 mmol/L Final   • Potassium 11/02/2022 4.1  3.5 - 5.2 mmol/L Final   • Chloride 11/02/2022 109 (H)  98 - 107 mmol/L Final   • CO2 11/02/2022 22.1  22.0 - 29.0 mmol/L Final   • Calcium 11/02/2022 9.3  8.6 - 10.5 mg/dL Final   • Total Protein 11/02/2022 6.7  6.0 - 8.5 g/dL Final   • Albumin 11/02/2022 4.09  3.50 - 5.20 g/dL Final   • ALT (SGPT) 11/02/2022 16  1 - 41 U/L Final   • AST (SGOT) 11/02/2022 20  1 - 40 U/L Final   • Alkaline Phosphatase 11/02/2022 67  39 - 117 U/L Final   • Total Bilirubin 11/02/2022 0.5  0.0 - 1.2 mg/dL Final   • Globulin 11/02/2022 2.6  gm/dL Final   • A/G Ratio 11/02/2022 1.6  g/dL Final   •  BUN/Creatinine Ratio 11/02/2022 32.9 (H)  7.0 - 25.0 Final   • Anion Gap 11/02/2022 11.9  5.0 - 15.0 mmol/L Final   • eGFR 11/02/2022 101.0  >60.0 mL/min/1.73 Final    National Kidney Foundation and American Society of Nephrology (ASN) Task Force recommended calculation based on the Chronic Kidney Disease Epidemiology Collaboration (CKD-EPI) equation refit without adjustment for race.   • C-Reactive Protein 11/02/2022 <0.30  0.00 - 0.50 mg/dL Final   • Sed Rate 11/02/2022 7  0 - 20 mm/hr Final   • Prealbumin 11/02/2022 36.5  20.0 - 40.0 mg/dL Final   • WBC 11/02/2022 14.83 (H)  3.40 - 10.80 10*3/mm3 Final   • RBC 11/02/2022 4.67  4.14 - 5.80 10*6/mm3 Final   • Hemoglobin 11/02/2022 14.2  13.0 - 17.7 g/dL Final   • Hematocrit 11/02/2022 43.0  37.5 - 51.0 % Final   • MCV 11/02/2022 92.1  79.0 - 97.0 fL Final   • MCH 11/02/2022 30.4  26.6 - 33.0 pg Final   • MCHC 11/02/2022 33.0  31.5 - 35.7 g/dL Final   • RDW 11/02/2022 13.2  12.3 - 15.4 % Final   • RDW-SD 11/02/2022 44.1  37.0 - 54.0 fl Final   • MPV 11/02/2022 9.9  6.0 - 12.0 fL Final   • Platelets 11/02/2022 168  140 - 450 10*3/mm3 Final   • Neutrophil % 11/02/2022 81.6 (H)  42.7 - 76.0 % Final   • Lymphocyte % 11/02/2022 9.6 (L)  19.6 - 45.3 % Final   • Monocyte % 11/02/2022 6.3  5.0 - 12.0 % Final   • Eosinophil % 11/02/2022 1.9  0.3 - 6.2 % Final   • Basophil % 11/02/2022 0.3  0.0 - 1.5 % Final   • Immature Grans % 11/02/2022 0.3  0.0 - 0.5 % Final   • Neutrophils, Absolute 11/02/2022 12.11 (H)  1.70 - 7.00 10*3/mm3 Final   • Lymphocytes, Absolute 11/02/2022 1.42  0.70 - 3.10 10*3/mm3 Final   • Monocytes, Absolute 11/02/2022 0.93 (H)  0.10 - 0.90 10*3/mm3 Final   • Eosinophils, Absolute 11/02/2022 0.28  0.00 - 0.40 10*3/mm3 Final   • Basophils, Absolute 11/02/2022 0.04  0.00 - 0.20 10*3/mm3 Final   • Immature Grans, Absolute 11/02/2022 0.05  0.00 - 0.05 10*3/mm3 Final   • nRBC 11/02/2022 0.0  0.0 - 0.2 /100 WBC Final        Procedure:       Assessment/Plan:    Peyronie's Disease - we discussed the pathophysiology of this at length.  I went ahead and daksha a diagram showing the disease status and how it has about an 18% association with a congenital condition called Dupuytren's contracture which is very much an inherited disease but is seen in men at the average age in the 50s and is associated many times with diabetes.  However, the vast majority of this disease is related to penile trauma especially in the mid 50s when testosterone levels are dropping and a rockhard erection is not an option causing bending at the flexion point of the penis.  We discussed treatment options including the use of Xiaflex, which is clostridial collagenase causing a dissolution of the plaque.  This is especially useful single bend, however, we also discussed penile traction devices, vacuum pumps, and the use of low-dose PDE-5 inhibitors have been effective. We also discussed the significant foreshortening of the penis which is a hallmark of the disease. This is a very difficult situation for him and we will initiate aggressive therapy as his wife is very interested in resuming sexual intercourse.  He also discussed the various antiquated therapies including the use of colchicine, PABA, verapamil, and vitamin E therapy.  We discussed treatment options corrector's etc. currently I do not think he is a candidate for  BPH: Discussed the pathophysiology of BPH and obstruction.  We discussed the static and dynamic effects of BPH as well as using 5 alpha reductase inhibitors versus alpha blockade.  We discussed the indications for transurethral surgery as well and/ or other therapeutic options available including all of the newer techniques.  PSA testing-I am recommending a PSA blood test that stands for prostate specific antigen.  I discussed the pathophysiology of PSA testing indicating its use in the diagnosis and management of prostate cancer.  I discussed the normal range being 0 to 4, but  more appropriately being much closer to 0 to 2 in a normal male.  I discussed the fact that after a certain age we don't recommend PSA testing especially in view of numerous comorbidities, that this will not be a useful test.  I discussed many of the things that can artificially raise PSA including a recent infection, urinary tract infection, and recent sexual intercourse, or even the type of movement such as manipulation of the prostate from riding a bicycle.  After all this is taken into account when the test is reviewed, the most important use of PSA is the velocity measurement.  In other words, the change of PSA with time is a very important factor in the use and that we look for greater than 20% rise over a year to help us make the prediction of prostate cancer.  I also discussed that the use with prostate cancer indicating that after a radical prostatectomy, the PSA should be 0 and any rise indicates an early biochemical recurrence.                This document has been electronically signed by HERNANDEZ BURRIS MD January 24, 2023 09:39 EST    Dictated Utilizing Dragon Dictation: Part of this note may be an electronic transcription/translation of spoken language to printed text using the Dragon Dictation System.

## 2023-01-24 NOTE — TELEPHONE ENCOUNTER
He was here today and Dr. Gutierrez was wanting to order an US but he remembered that he had one done in November. He was just making sure we still needed to do another one.

## 2023-01-25 ENCOUNTER — HOSPITAL ENCOUNTER (OUTPATIENT)
Dept: WOUND CARE | Facility: HOSPITAL | Age: 68
Discharge: HOME | End: 2023-01-25
Admitting: NURSE PRACTITIONER
Payer: COMMERCIAL

## 2023-01-25 VITALS
SYSTOLIC BLOOD PRESSURE: 136 MMHG | RESPIRATION RATE: 18 BRPM | DIASTOLIC BLOOD PRESSURE: 79 MMHG | TEMPERATURE: 98.3 F | HEART RATE: 69 BPM

## 2023-01-25 DIAGNOSIS — L97.322 NON-PRESSURE CHRONIC ULCER OF LEFT ANKLE WITH FAT LAYER EXPOSED: ICD-10-CM

## 2023-01-25 PROCEDURE — 97602 WOUND(S) CARE NON-SELECTIVE: CPT

## 2023-01-25 NOTE — PROGRESS NOTES
Wound Clinic Note  Patient Identification:  Name:  Broderick Pereira  Age:  67 y.o.  Sex:  male  :  1955  MRN:  [id number]   Visit Number:  [id number]  Primary Care Physician:  Allyson Fox APRN     Subjective     Chief complaint:     Left ankle wound    History of presenting illness:     Patient is a 67 y.o. male with past medical history significant for  that presented today for evaluation of left medial ankle. Reports wound has been present for several weeks 3-4 approximately. Unsure of how the area occurred just noticed the area. Denies any trauma to the area that he can recall. He has been applying triple antibiotic to the area. He denies history of diabetes. Is non-smoker. He is currently on Doxycycline started 2022 prescribed by PCP. Denies any prior vascular disease. He has some imaging completed ordered by PCP: Arterial US 2022: Unremarkable exam with no occlusive segments or segments of high-grade. Venous US: No DVT in the left lower extremity on today's exam.  X-ray foot 2022: Soft tissue defect in the posterior subcutaneous tissues at the level of the distal tibia, but no erosion of bone. X-ray ankle 2022: No evidence of osteomyelitis on today's exam.    Interval History:   10/05/2022: Patient seen in clinic today for follow-up to left medial ankle wound. He has had wound vac in place and wife and been performing changes at home. He has increase in swelling and erythema. He states he has been on his feet with work the past several days. Denies any fever or chills. Reports some increase in pain after being on his feet for several hours at work.     10/19/2022: Patient seen in clinic today for follow-up to left medial ankle ulcer. Wound unchanged, continues with yellow slough. Denies any fever or chills. He reports adding meghan to diet and increasing protein intake to help promote wound healing. Swelling appears to be controlled. No new issues or concerns reported.      10/26/2022: Patient seen in clinic today for follow-up to left medial ankle ulcer. Wound stable, there is slight decrease in depth. Continues with bioburden coverage. He is tolerating current wound treatment. Denies any fever or chills. He states he is consuming protein supplements and meghan. No new issues or concerns reported.    11/02/2022:Patient seen in clinic today for follow-up to left medial ankle ulcer. Wound stable, depth has slightly increased. Continues with bioburden coverage. He is tolerating current wound treatment.  He left compression wraps in place, there was reported odor upon removal of dressing. Denies any fever or chills. He states he is consuming protein supplements and meghan. No new issues or concerns reported.    11/09/2022: Patient seen in clinic today for follow-up to left medial ankle ulcer. Wound now with hypergranulation to the distal portion. Minimal pain reported. Denies any fever or chills. He had 2 layer compression wrap applied and had left in place for 4 days before having to remove. No new issues or concerns reported. Labs reviewed: Glucose 98, albumin 4.09, prealbumin 36.5, CRP < 0.30, WBC 14.83, sed rate 7.     11/16/2022: Patient seen in clinic today for follow-up to left medial ankle ulcer. Wound decrease in depth. There continues to be hypergranulation to distal portion.  Denies any fever or chills. He continues with meghan and high protein intake.. Swelling appears to be controlled. No new issues or concerns reported.  Tolerating current treatment with compression wraps.     11/23/2022: Patient seen in clinic today for follow-up to left medial ankle ulcer. Wound stable, depth continues to be deep on the proximal wound. Denies any fever or chills. He states wife smelt an order the last few days. No other issues or concerns reported at this time. He was able to wear compression wraps for full week. No concerns to wrap.     11/30/2022: Patient seen in clinic today for  follow-up to left medial ankle ulcer. Wound with hypergranulation to distal wound. Slough remains present. Denies any fever or chills. There is increase in erythema to periwound, this could be from  Increase in drainage versus acute infection. No other issues or concerns reported at this time. He was able to wear compression wraps for full week. No concerns to wrap.     12/06/2022: Patient seen in clinic today for follow-up to left medial ankle ulcer. Wound with hypergranulation to distal wound this continues julian present. Slough remains present slightly decreased from prior exam.  Denies any fever or chills. There is increase in erythema to periwound, this could be from  Increase in drainage versus acute infection. No other issues or concerns reported at this time. He was able to wear compression wraps for full week. No concerns to wrap.     12/14/2022:  Patient seen in clinic today for follow-up to left medial ankle ulcer. Wound base red and moist, scattered yellow Slough remains present. Denies any fever or chills. Erythema to periwound is present, no warmth. Reports mild pain to the site.  No other issues or concerns reported at this time. He was able to wear compression wraps for full week. No concerns to wrap.    12/21/2022: Patient seen in clinic today for follow-up to left medial ankle ulcer. Wound base red and moist, scattered yellow Slough remains present. Denies any fever or chills. Erythema to periwound has improved.  Reports mild pain to the site.  No other issues or concerns reported at this time. He was able to wear compression wraps for full week. No concerns to wrap.    12/28/2022: seen in clinic today for follow-up to left medial ankle ulcer. Wound base red and moist, scattered yellow Slough remains present. Denies any fever or chills.  Reports mild pain to the site.  No other issues or concerns reported at this time. He was able to wear compression wraps for full week. No concerns to wrap. He has  received his topical antibiotic powder.    01/04/2023: seen in clinic today for follow-up to left medial ankle ulcer. Wound base red and moist, scattered yellow Slough remains present. Decreased in surface area. Denies any fever or chills.  Reports mild pain to the site.  No other issues or concerns reported at this time. He was able to wear compression wraps for full week. No concerns to wrap. He has received his topical antibiotic powder.    01/11/2023: seen in clinic today for follow-up to left medial ankle ulcer. Wound base red and moist, scattered yellow Slough remains present. Continues to improve. Denies any fever or chills.  Reports mild pain to the site.  No other issues or concerns reported at this time. He was able to wear compression wraps for full week. No concerns to wrap. Using topical antibiotic powder.     01/18/2023: seen in clinic today for follow-up to left medial ankle ulcer. Wound base red and moist, there is some bioburden present to base. Decreased in surface area. Denies any fever or chills.  Reports mild pain to the site.  No other issues or concerns reported at this time. He was able to wear compression wraps for full week. No concerns to wrap. He has received his topical antibiotic powder.     01/25/2023: Seen in clinic today for follow-up to left medial ankle ulcer. Wound base red and moist. Denies any fever or chills.  Reports mild pain to the site.  No other issues or concerns reported at this time. He was able to wear compression wraps for full week. No concerns to wrap. He has received his topical antibiotic powder.  ---------------------------------------------------------------------------------------------------------------------   Review of Systems   Constitutional: Negative for chills and fever.   HENT: Negative for congestion and rhinorrhea.    Respiratory: Negative for cough and shortness of breath.    Cardiovascular: Positive for leg swelling. Negative for chest pain.         Intermittent    Gastrointestinal: Negative for nausea and vomiting.   Musculoskeletal: Negative for back pain and gait problem.   Skin: Positive for wound.   Hematological: Does not bruise/bleed easily.      ---------------------------------------------------------------------------------------------------------------------   Past Medical History:   Diagnosis Date   • Arthritis    • Hypertension    • Kidney stone    • Sleep apnea      Past Surgical History:   Procedure Laterality Date   • ABDOMINAL SURGERY     • CYSTOLITHALOPAXY PERCUTANEOUS N/A 10/10/2022    Procedure: CYSTOLITHOLAPAXY WITH LASER;  Surgeon: Merrick Gutierrez MD;  Location: [facility];  Service: Urology;  Laterality: N/A;   • CYSTOSCOPY, URETEROSCOPY, RETROGRADE PYELOGRAM, STENT INSERTION Left 10/10/2022    Procedure: URETEROSCOPY RETROGRADE PYELOGRAM HOLMIUM LASER STENT INSERTION;  Surgeon: Merrick Gutierrez MD;  Location: [facility];  Service: Urology;  Laterality: Left;   • FRACTURE SURGERY     • GALLBLADDER SURGERY      15 or more years ago   • KIDNEY STONE SURGERY     • LEG SURGERY Left 2002    has dori in left leg     Family History   Problem Relation Age of Onset   • Hypertension Sister      Social History     Socioeconomic History   • Marital status:    Tobacco Use   • Smoking status: Never   • Smokeless tobacco: Never   Vaping Use   • Vaping Use: Never used   Substance and Sexual Activity   • Alcohol use: Never   • Drug use: Never   • Sexual activity: Defer     Partners: Female     ---------------------------------------------------------------------------------------------------------------------   Allergies:  Sulfa antibiotics  ---------------------------------------------------------------------------------------------------------------------  Objective     ---------------------------------------------------------------------------------------------------------------------   Vital Signs:  Temp:  [98.3 °F (36.8 °C)] 98.3  °F (36.8 °C)  Heart Rate:  [69] 69  Resp:  [18] 18  BP: (136)/(79) 136/79  No data found.  There were no vitals filed for this visit.  There is no height or weight on file to calculate BMI.  Wt Readings from Last 3 Encounters:   01/24/23 78 kg (172 lb)   10/10/22 78.2 kg (172 lb 6.4 oz)   10/07/22 79.4 kg (175 lb)       ---------------------------------------------------------------------------------------------------------------------   Physical Exam  Constitutional: Vital sign were reviewed (temperature, pulse, respiration, and blood pressure) and found to be within expected limits per nursing, general appearance was assessed and the patient was found to be in no distress and calm and comfortable appears  Skin: Temperature:normal turgor and temperatureColor: normal, no cyanosis, jaundice, pallor or bruising, Moisture: dry,Nails: thickened yellow toenails bed, Hair:thinning to lower extremities .  Physical Exam  Wound Assessment:     01/25/23 1002   Wound 09/28/22 0837 Left anterior ankle   Placement Date/Time: 09/28/22 0837   Side: Left  Orientation: anterior  Location: ankle   Wound Image    Closure Adhesive bandage   Base moist;pink;yellow;red   Red (%), Wound Tissue Color 100   Periwound blanchable;dry;redness   Periwound Temperature warm   Periwound Skin Turgor firm   Edges callused   Wound Length (cm) 0.9 cm   Wound Width (cm) 0.5 cm   Wound Depth (cm) 0.1 cm   Wound Surface Area (cm^2) 0.45 cm^2   Wound Volume (cm^3) 0.045 cm^3   Undermining [Depth (cm)/Location] 0   Drainage Characteristics/Odor serosanguineous   Drainage Amount moderate   Care, Wound   (ATB ointment)   Dressing Care dressing applied     Wound Goal (s):Closure, Epithelization, Free of infection and No further symptoms  Assessment & Plan      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 This is patient  supplied  -Will measure for farrow wraps next weeek  - Labs reviewed 11/09/2022: Glucose 98, albumin 4.09, prealbumin 36.5, CRP < 0.30, WBC 14.83, sed rate 7.   -Recommend ailyn protein diet 120g/day along with vitamin C 2000mg/day, vitamin A 5000 Units/day, vitamin D3 5000 Units/day, zinc 50mg/day to help promote wound healing    Clinical Impression:Moderate Complexity    Follow-up: 1 week    MICKEY Brasher   [facility]  01/25/2023  0968

## 2023-01-29 PROBLEM — N48.6 PEYRONIE DISEASE: Status: ACTIVE | Noted: 2023-01-29

## 2023-02-08 ENCOUNTER — HOSPITAL ENCOUNTER (OUTPATIENT)
Dept: WOUND CARE | Facility: HOSPITAL | Age: 68
Discharge: HOME | End: 2023-02-08
Payer: COMMERCIAL

## 2023-02-08 ENCOUNTER — HOSPITAL ENCOUNTER (OUTPATIENT)
Dept: ULTRASOUND IMAGING | Facility: HOSPITAL | Age: 68
Discharge: HOME | End: 2023-02-08
Payer: COMMERCIAL

## 2023-02-08 VITALS
SYSTOLIC BLOOD PRESSURE: 127 MMHG | RESPIRATION RATE: 18 BRPM | TEMPERATURE: 98.6 F | HEART RATE: 85 BPM | DIASTOLIC BLOOD PRESSURE: 80 MMHG

## 2023-02-08 DIAGNOSIS — N20.1 LEFT URETERAL STONE: ICD-10-CM

## 2023-02-08 PROCEDURE — 76775 US EXAM ABDO BACK WALL LIM: CPT | Performed by: RADIOLOGY

## 2023-02-08 PROCEDURE — 76775 US EXAM ABDO BACK WALL LIM: CPT

## 2023-02-08 PROCEDURE — 97602 WOUND(S) CARE NON-SELECTIVE: CPT

## 2023-02-13 NOTE — PROGRESS NOTES
Wound Clinic Note  Patient Identification:  Name:  Broderick Pereira  Age:  67 y.o.  Sex:  male  :  1955  MRN:  [id number]   Visit Number:  [id number]  Primary Care Physician:  Allyson Fox APRN     Subjective     Chief complaint:     Left ankle wound    History of presenting illness:     Patient is a 67 y.o. male with past medical history significant for  that presented today for evaluation of left medial ankle. Reports wound has been present for several weeks 3-4 approximately. Unsure of how the area occurred just noticed the area. Denies any trauma to the area that he can recall. He has been applying triple antibiotic to the area. He denies history of diabetes. Is non-smoker. He is currently on Doxycycline started 2022 prescribed by PCP. Denies any prior vascular disease. He has some imaging completed ordered by PCP: Arterial US 2022: Unremarkable exam with no occlusive segments or segments of high-grade. Venous US: No DVT in the left lower extremity on today's exam.  X-ray foot 2022: Soft tissue defect in the posterior subcutaneous tissues at the level of the distal tibia, but no erosion of bone. X-ray ankle 2022: No evidence of osteomyelitis on today's exam.    Interval History:   10/05/2022: Patient seen in clinic today for follow-up to left medial ankle wound. He has had wound vac in place and wife and been performing changes at home. He has increase in swelling and erythema. He states he has been on his feet with work the past several days. Denies any fever or chills. Reports some increase in pain after being on his feet for several hours at work.     10/19/2022: Patient seen in clinic today for follow-up to left medial ankle ulcer. Wound unchanged, continues with yellow slough. Denies any fever or chills. He reports adding meghan to diet and increasing protein intake to help promote wound healing. Swelling appears to be controlled. No new issues or concerns reported.      10/26/2022: Patient seen in clinic today for follow-up to left medial ankle ulcer. Wound stable, there is slight decrease in depth. Continues with bioburden coverage. He is tolerating current wound treatment. Denies any fever or chills. He states he is consuming protein supplements and meghan. No new issues or concerns reported.    11/02/2022:Patient seen in clinic today for follow-up to left medial ankle ulcer. Wound stable, depth has slightly increased. Continues with bioburden coverage. He is tolerating current wound treatment.  He left compression wraps in place, there was reported odor upon removal of dressing. Denies any fever or chills. He states he is consuming protein supplements and meghan. No new issues or concerns reported.    11/09/2022: Patient seen in clinic today for follow-up to left medial ankle ulcer. Wound now with hypergranulation to the distal portion. Minimal pain reported. Denies any fever or chills. He had 2 layer compression wrap applied and had left in place for 4 days before having to remove. No new issues or concerns reported. Labs reviewed: Glucose 98, albumin 4.09, prealbumin 36.5, CRP < 0.30, WBC 14.83, sed rate 7.     11/16/2022: Patient seen in clinic today for follow-up to left medial ankle ulcer. Wound decrease in depth. There continues to be hypergranulation to distal portion.  Denies any fever or chills. He continues with meghan and high protein intake.. Swelling appears to be controlled. No new issues or concerns reported.  Tolerating current treatment with compression wraps.     11/23/2022: Patient seen in clinic today for follow-up to left medial ankle ulcer. Wound stable, depth continues to be deep on the proximal wound. Denies any fever or chills. He states wife smelt an order the last few days. No other issues or concerns reported at this time. He was able to wear compression wraps for full week. No concerns to wrap.     11/30/2022: Patient seen in clinic today for  follow-up to left medial ankle ulcer. Wound with hypergranulation to distal wound. Slough remains present. Denies any fever or chills. There is increase in erythema to periwound, this could be from  Increase in drainage versus acute infection. No other issues or concerns reported at this time. He was able to wear compression wraps for full week. No concerns to wrap.     12/06/2022: Patient seen in clinic today for follow-up to left medial ankle ulcer. Wound with hypergranulation to distal wound this continues julian present. Slough remains present slightly decreased from prior exam.  Denies any fever or chills. There is increase in erythema to periwound, this could be from  Increase in drainage versus acute infection. No other issues or concerns reported at this time. He was able to wear compression wraps for full week. No concerns to wrap.     12/14/2022:  Patient seen in clinic today for follow-up to left medial ankle ulcer. Wound base red and moist, scattered yellow Slough remains present. Denies any fever or chills. Erythema to periwound is present, no warmth. Reports mild pain to the site.  No other issues or concerns reported at this time. He was able to wear compression wraps for full week. No concerns to wrap.    12/21/2022: Patient seen in clinic today for follow-up to left medial ankle ulcer. Wound base red and moist, scattered yellow Slough remains present. Denies any fever or chills. Erythema to periwound has improved.  Reports mild pain to the site.  No other issues or concerns reported at this time. He was able to wear compression wraps for full week. No concerns to wrap.    12/28/2022: seen in clinic today for follow-up to left medial ankle ulcer. Wound base red and moist, scattered yellow Slough remains present. Denies any fever or chills.  Reports mild pain to the site.  No other issues or concerns reported at this time. He was able to wear compression wraps for full week. No concerns to wrap. He has  received his topical antibiotic powder.    01/04/2023: seen in clinic today for follow-up to left medial ankle ulcer. Wound base red and moist, scattered yellow Slough remains present. Decreased in surface area. Denies any fever or chills.  Reports mild pain to the site.  No other issues or concerns reported at this time. He was able to wear compression wraps for full week. No concerns to wrap. He has received his topical antibiotic powder.    01/11/2023: seen in clinic today for follow-up to left medial ankle ulcer. Wound base red and moist, scattered yellow Slough remains present. Continues to improve. Denies any fever or chills.  Reports mild pain to the site.  No other issues or concerns reported at this time. He was able to wear compression wraps for full week. No concerns to wrap. Using topical antibiotic powder.     01/18/2023: seen in clinic today for follow-up to left medial ankle ulcer. Wound base red and moist, there is some bioburden present to base. Decreased in surface area. Denies any fever or chills.  Reports mild pain to the site.  No other issues or concerns reported at this time. He was able to wear compression wraps for full week. No concerns to wrap. He has received his topical antibiotic powder.     01/25/2023: Seen in clinic today for follow-up to left medial ankle ulcer. Wound base red and moist. Denies any fever or chills.  Reports mild pain to the site.  No other issues or concerns reported at this time. He was able to wear compression wraps for full week. No concerns to wrap. He has received his topical antibiotic powder.    02/08/2023: Seen in clinic today for follow-up to left medial ankle ulcer. Wound base dryt. Denies any fever or chills.  Reports minimal pain.  No other issues or concerns reported at this time.  No concerns to wrap. Tolerating current treatment without complications.      ---------------------------------------------------------------------------------------------------------------------   Review of Systems   Constitutional: Negative for chills and fever.   HENT: Negative for congestion and rhinorrhea.    Respiratory: Negative for cough and shortness of breath.    Cardiovascular: Positive for leg swelling. Negative for chest pain.        Intermittent    Gastrointestinal: Negative for nausea and vomiting.   Musculoskeletal: Negative for back pain and gait problem.   Skin: Positive for wound.   Hematological: Does not bruise/bleed easily.      ---------------------------------------------------------------------------------------------------------------------   Past Medical History:   Diagnosis Date   • Arthritis    • Hypertension    • Kidney stone    • Sleep apnea      Past Surgical History:   Procedure Laterality Date   • ABDOMINAL SURGERY     • CYSTOLITHALOPAXY PERCUTANEOUS N/A 10/10/2022    Procedure: CYSTOLITHOLAPAXY WITH LASER;  Surgeon: Merrick Gutierrez MD;  Location: [facility];  Service: Urology;  Laterality: N/A;   • CYSTOSCOPY, URETEROSCOPY, RETROGRADE PYELOGRAM, STENT INSERTION Left 10/10/2022    Procedure: URETEROSCOPY RETROGRADE PYELOGRAM HOLMIUM LASER STENT INSERTION;  Surgeon: Merrick Gutierrez MD;  Location: [facility];  Service: Urology;  Laterality: Left;   • FRACTURE SURGERY     • GALLBLADDER SURGERY      15 or more years ago   • KIDNEY STONE SURGERY     • LEG SURGERY Left 2002    has dori in left leg     Family History   Problem Relation Age of Onset   • Hypertension Sister      Social History     Socioeconomic History   • Marital status:    Tobacco Use   • Smoking status: Never   • Smokeless tobacco: Never   Vaping Use   • Vaping Use: Never used   Substance and Sexual Activity   • Alcohol use: Never   • Drug use: Never   • Sexual activity: Defer     Partners: Female      ---------------------------------------------------------------------------------------------------------------------   Allergies:  Sulfa antibiotics  ---------------------------------------------------------------------------------------------------------------------  Objective     ---------------------------------------------------------------------------------------------------------------------   Vital Signs:     No data found.  There were no vitals filed for this visit.  There is no height or weight on file to calculate BMI.  Wt Readings from Last 3 Encounters:   01/24/23 78 kg (172 lb)   10/10/22 78.2 kg (172 lb 6.4 oz)   10/07/22 79.4 kg (175 lb)       ---------------------------------------------------------------------------------------------------------------------   Physical Exam  Constitutional: Vital sign were reviewed (temperature, pulse, respiration, and blood pressure) and found to be within expected limits per nursing, general appearance was assessed and the patient was found to be in no distress and calm and comfortable appears  Skin: Temperature:normal turgor and temperatureColor: normal, no cyanosis, jaundice, pallor or bruising, Moisture: dry,Nails: thickened yellow toenails bed, Hair:thinning to lower extremities .  Physical Exam  Wound Assessment:     02/08/23 1415   Wound 09/28/22 0837 Left anterior ankle   Placement Date/Time: 09/28/22 0837   Side: Left  Orientation: anterior  Location: ankle   Wound Image        Closure Adhesive bandage   Base dry   Red (%), Wound Tissue Color 100   Periwound blanchable;dry;redness   Periwound Temperature warm   Periwound Skin Turgor firm   Edges dry   Wound Length (cm) 0.6 cm   Wound Width (cm) 0.4 cm   Wound Depth (cm) 0.1 cm   Undermining [Depth (cm)/Location] 0   Drainage Characteristics/Odor serosanguineous   Drainage Amount moderate   Care, Wound   (ATB ointment)   Dressing Care dressing applied     Wound Goal (s):Closure, Epithelization, Free of  infection and No further symptoms  Assessment & Plan      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   -Recommend ailyn protein diet 120g/day along with vitamin C 2000mg/day, vitamin A 5000 Units/day, vitamin D3 5000 Units/day, zinc 50mg/day to help promote wound healing    Clinical Impression:Moderate Complexity    Follow-up: 1 week    MICKEY Brasher   [facility]  02/08/2023  [id number]

## 2023-02-15 ENCOUNTER — HOSPITAL ENCOUNTER (OUTPATIENT)
Dept: WOUND CARE | Facility: HOSPITAL | Age: 68
Discharge: HOME | End: 2023-02-15
Admitting: NURSE PRACTITIONER
Payer: COMMERCIAL

## 2023-02-15 DIAGNOSIS — L97.322 NON-PRESSURE CHRONIC ULCER OF LEFT ANKLE WITH FAT LAYER EXPOSED: Primary | ICD-10-CM

## 2023-02-15 DIAGNOSIS — L97.325 NON-PRESSURE CHRONIC ULCER OF LEFT ANKLE WITH MUSCLE INVOLVEMENT WITHOUT EVIDENCE OF NECROSIS: ICD-10-CM

## 2023-02-15 PROCEDURE — 97602 WOUND(S) CARE NON-SELECTIVE: CPT

## 2023-02-15 RX ORDER — SODIUM HYPOCHLORITE 2.5 MG/ML
1 SOLUTION TOPICAL AS NEEDED
OUTPATIENT
Start: 2023-02-15

## 2023-02-15 RX ORDER — LIDOCAINE HYDROCHLORIDE 20 MG/ML
JELLY TOPICAL AS NEEDED
Status: DISCONTINUED | OUTPATIENT
Start: 2023-02-15 | End: 2023-02-16 | Stop reason: HOSPADM

## 2023-02-15 RX ORDER — CASTOR OIL AND BALSAM, PERU 788; 87 MG/G; MG/G
1 OINTMENT TOPICAL AS NEEDED
OUTPATIENT
Start: 2023-02-15

## 2023-02-15 RX ORDER — SODIUM HYPOCHLORITE 1.25 MG/ML
1 SOLUTION TOPICAL AS NEEDED
OUTPATIENT
Start: 2023-02-15

## 2023-02-15 RX ORDER — LIDOCAINE HYDROCHLORIDE 20 MG/ML
JELLY TOPICAL AS NEEDED
Start: 2023-02-15

## 2023-02-15 RX ADMIN — LIDOCAINE HYDROCHLORIDE: 20 JELLY TOPICAL at 10:05

## 2023-02-15 NOTE — PROGRESS NOTES
Wound Clinic Note  Patient Identification:  Name:  Broderick Pereira  Age:  67 y.o.  Sex:  male  :  1955  MRN:  [id number]   Visit Number:  [id number]  Primary Care Physician:  Allyson Fox APRN     Subjective     Chief complaint:     Left ankle wound    History of presenting illness:     Patient is a 67 y.o. male with past medical history significant for  that presented today for evaluation of left medial ankle. Reports wound has been present for several weeks 3-4 approximately. Unsure of how the area occurred just noticed the area. Denies any trauma to the area that he can recall. He has been applying triple antibiotic to the area. He denies history of diabetes. Is non-smoker. He is currently on Doxycycline started 2022 prescribed by PCP. Denies any prior vascular disease. He has some imaging completed ordered by PCP: Arterial US 2022: Unremarkable exam with no occlusive segments or segments of high-grade. Venous US: No DVT in the left lower extremity on today's exam.  X-ray foot 2022: Soft tissue defect in the posterior subcutaneous tissues at the level of the distal tibia, but no erosion of bone. X-ray ankle 2022: No evidence of osteomyelitis on today's exam.    Interval History:   10/05/2022: Patient seen in clinic today for follow-up to left medial ankle wound. He has had wound vac in place and wife and been performing changes at home. He has increase in swelling and erythema. He states he has been on his feet with work the past several days. Denies any fever or chills. Reports some increase in pain after being on his feet for several hours at work.     10/19/2022: Patient seen in clinic today for follow-up to left medial ankle ulcer. Wound unchanged, continues with yellow slough. Denies any fever or chills. He reports adding meghan to diet and increasing protein intake to help promote wound healing. Swelling appears to be controlled. No new issues or concerns reported.      10/26/2022: Patient seen in clinic today for follow-up to left medial ankle ulcer. Wound stable, there is slight decrease in depth. Continues with bioburden coverage. He is tolerating current wound treatment. Denies any fever or chills. He states he is consuming protein supplements and meghan. No new issues or concerns reported.    11/02/2022:Patient seen in clinic today for follow-up to left medial ankle ulcer. Wound stable, depth has slightly increased. Continues with bioburden coverage. He is tolerating current wound treatment.  He left compression wraps in place, there was reported odor upon removal of dressing. Denies any fever or chills. He states he is consuming protein supplements and meghan. No new issues or concerns reported.    11/09/2022: Patient seen in clinic today for follow-up to left medial ankle ulcer. Wound now with hypergranulation to the distal portion. Minimal pain reported. Denies any fever or chills. He had 2 layer compression wrap applied and had left in place for 4 days before having to remove. No new issues or concerns reported. Labs reviewed: Glucose 98, albumin 4.09, prealbumin 36.5, CRP < 0.30, WBC 14.83, sed rate 7.     11/16/2022: Patient seen in clinic today for follow-up to left medial ankle ulcer. Wound decrease in depth. There continues to be hypergranulation to distal portion.  Denies any fever or chills. He continues with meghan and high protein intake.. Swelling appears to be controlled. No new issues or concerns reported.  Tolerating current treatment with compression wraps.     11/23/2022: Patient seen in clinic today for follow-up to left medial ankle ulcer. Wound stable, depth continues to be deep on the proximal wound. Denies any fever or chills. He states wife smelt an order the last few days. No other issues or concerns reported at this time. He was able to wear compression wraps for full week. No concerns to wrap.     11/30/2022: Patient seen in clinic today for  follow-up to left medial ankle ulcer. Wound with hypergranulation to distal wound. Slough remains present. Denies any fever or chills. There is increase in erythema to periwound, this could be from  Increase in drainage versus acute infection. No other issues or concerns reported at this time. He was able to wear compression wraps for full week. No concerns to wrap.     12/06/2022: Patient seen in clinic today for follow-up to left medial ankle ulcer. Wound with hypergranulation to distal wound this continues julian present. Slough remains present slightly decreased from prior exam.  Denies any fever or chills. There is increase in erythema to periwound, this could be from  Increase in drainage versus acute infection. No other issues or concerns reported at this time. He was able to wear compression wraps for full week. No concerns to wrap.     12/14/2022:  Patient seen in clinic today for follow-up to left medial ankle ulcer. Wound base red and moist, scattered yellow Slough remains present. Denies any fever or chills. Erythema to periwound is present, no warmth. Reports mild pain to the site.  No other issues or concerns reported at this time. He was able to wear compression wraps for full week. No concerns to wrap.    12/21/2022: Patient seen in clinic today for follow-up to left medial ankle ulcer. Wound base red and moist, scattered yellow Slough remains present. Denies any fever or chills. Erythema to periwound has improved.  Reports mild pain to the site.  No other issues or concerns reported at this time. He was able to wear compression wraps for full week. No concerns to wrap.    12/28/2022: seen in clinic today for follow-up to left medial ankle ulcer. Wound base red and moist, scattered yellow Slough remains present. Denies any fever or chills.  Reports mild pain to the site.  No other issues or concerns reported at this time. He was able to wear compression wraps for full week. No concerns to wrap. He has  received his topical antibiotic powder.    01/04/2023: seen in clinic today for follow-up to left medial ankle ulcer. Wound base red and moist, scattered yellow Slough remains present. Decreased in surface area. Denies any fever or chills.  Reports mild pain to the site.  No other issues or concerns reported at this time. He was able to wear compression wraps for full week. No concerns to wrap. He has received his topical antibiotic powder.    01/11/2023: seen in clinic today for follow-up to left medial ankle ulcer. Wound base red and moist, scattered yellow Slough remains present. Continues to improve. Denies any fever or chills.  Reports mild pain to the site.  No other issues or concerns reported at this time. He was able to wear compression wraps for full week. No concerns to wrap. Using topical antibiotic powder.     01/18/2023: seen in clinic today for follow-up to left medial ankle ulcer. Wound base red and moist, there is some bioburden present to base. Decreased in surface area. Denies any fever or chills.  Reports mild pain to the site.  No other issues or concerns reported at this time. He was able to wear compression wraps for full week. No concerns to wrap. He has received his topical antibiotic powder.     01/25/2023: Seen in clinic today for follow-up to left medial ankle ulcer. Wound base red and moist. Denies any fever or chills.  Reports mild pain to the site.  No other issues or concerns reported at this time. He was able to wear compression wraps for full week. No concerns to wrap. He has received his topical antibiotic powder.    02/08/2023: Seen in clinic today for follow-up to left medial ankle ulcer. Wound base dryt. Denies any fever or chills.  Reports minimal pain.  No other issues or concerns reported at this time.  No concerns to wrap. Tolerating current treatment without complications.      ---------------------------------------------------------------------------------------------------------------------   Review of Systems   Constitutional: Negative for chills and fever.   HENT: Negative for congestion and rhinorrhea.    Respiratory: Negative for cough and shortness of breath.    Cardiovascular: Positive for leg swelling. Negative for chest pain.        Intermittent    Gastrointestinal: Negative for nausea and vomiting.   Musculoskeletal: Negative for back pain and gait problem.   Skin: Positive for wound.   Hematological: Does not bruise/bleed easily.      ---------------------------------------------------------------------------------------------------------------------   Past Medical History:   Diagnosis Date   • Arthritis    • Hypertension    • Kidney stone    • Sleep apnea      Past Surgical History:   Procedure Laterality Date   • ABDOMINAL SURGERY     • CYSTOLITHALOPAXY PERCUTANEOUS N/A 10/10/2022    Procedure: CYSTOLITHOLAPAXY WITH LASER;  Surgeon: Merrick Gutierrez MD;  Location: [facility];  Service: Urology;  Laterality: N/A;   • CYSTOSCOPY, URETEROSCOPY, RETROGRADE PYELOGRAM, STENT INSERTION Left 10/10/2022    Procedure: URETEROSCOPY RETROGRADE PYELOGRAM HOLMIUM LASER STENT INSERTION;  Surgeon: Merrick Gutierrez MD;  Location: [facility];  Service: Urology;  Laterality: Left;   • FRACTURE SURGERY     • GALLBLADDER SURGERY      15 or more years ago   • KIDNEY STONE SURGERY     • LEG SURGERY Left 2002    has dori in left leg     Family History   Problem Relation Age of Onset   • Hypertension Sister      Social History     Socioeconomic History   • Marital status:    Tobacco Use   • Smoking status: Never   • Smokeless tobacco: Never   Vaping Use   • Vaping Use: Never used   Substance and Sexual Activity   • Alcohol use: Never   • Drug use: Never   • Sexual activity: Defer     Partners: Female      ---------------------------------------------------------------------------------------------------------------------   Allergies:  Sulfa antibiotics  ---------------------------------------------------------------------------------------------------------------------  Objective     ---------------------------------------------------------------------------------------------------------------------   Vital Signs:     No data found.  There were no vitals filed for this visit.  There is no height or weight on file to calculate BMI.  Wt Readings from Last 3 Encounters:   01/24/23 78 kg (172 lb)   10/10/22 78.2 kg (172 lb 6.4 oz)   10/07/22 79.4 kg (175 lb)       ---------------------------------------------------------------------------------------------------------------------   Physical Exam  Constitutional: Vital sign were reviewed (temperature, pulse, respiration, and blood pressure) and found to be within expected limits per nursing, general appearance was assessed and the patient was found to be in no distress and calm and comfortable appears  Skin: Temperature:normal turgor and temperatureColor: normal, no cyanosis, jaundice, pallor or bruising, Moisture: dry,Nails: thickened yellow toenails bed, Hair:thinning to lower extremities .  Physical Exam  Wound Assessment:     02/15/23 0958   [REMOVED] Wound 09/28/22 0837 Left anterior ankle   Removal Date/Time: 02/15/23 1025  Placement Date/Time: 09/28/22 0837   Side: Left  Orientation: anterior  Location: ankle  Wound Outcome: Healed   Wound Image    Dressing Appearance dry;intact   Closure Adhesive bandage   Base pink;closed/resurfaced   Periwound intact;dry   Periwound Temperature warm   Periwound Skin Turgor soft   Edges rolled/closed   Wound Length (cm) 0 cm   Wound Width (cm) 0 cm   Wound Depth (cm) 0 cm   Wound Surface Area (cm^2) 0 cm^2   Wound Volume (cm^3) 0 cm^3   Tunneling [Depth (cm)/Location] 0   Undermining [Depth (cm)/Location] 0   Drainage Amount  none   Care, Wound dressing removed;cleansed with;sterile normal saline;other (see comments)  (Zinc paste)   Dressing Care dressing applied;border dressing   Periwound Care dry periwound area maintained     Wound Goal (s):Closure, Epithelization, Free of infection and No further symptoms  Assessment & Plan      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

## 2023-03-14 ENCOUNTER — TELEPHONE (OUTPATIENT)
Dept: UROLOGY | Facility: CLINIC | Age: 68
End: 2023-03-14
Payer: COMMERCIAL

## 2023-03-14 NOTE — TELEPHONE ENCOUNTER
called the pt and let him know his US showed multipe kidney stone in his kidney and none were blocking so that unless he has an issues we do not need to see him again until a year.

## 2024-01-23 ENCOUNTER — HOSPITAL ENCOUNTER (OUTPATIENT)
Dept: GENERAL RADIOLOGY | Facility: HOSPITAL | Age: 69
Discharge: HOME | End: 2024-01-23
Admitting: UROLOGY
Payer: COMMERCIAL

## 2024-01-23 ENCOUNTER — OFFICE VISIT (OUTPATIENT)
Dept: UROLOGY | Facility: CLINIC | Age: 69
End: 2024-01-23
Payer: COMMERCIAL

## 2024-01-23 VITALS
BODY MASS INDEX: 28.22 KG/M2 | SYSTOLIC BLOOD PRESSURE: 170 MMHG | HEIGHT: 68 IN | WEIGHT: 186.2 LBS | DIASTOLIC BLOOD PRESSURE: 99 MMHG | HEART RATE: 83 BPM

## 2024-01-23 DIAGNOSIS — N20.0 BILATERAL NEPHROLITHIASIS: ICD-10-CM

## 2024-01-23 DIAGNOSIS — N20.1 LEFT URETERAL STONE: ICD-10-CM

## 2024-01-23 DIAGNOSIS — N42.9 DISORDER OF PROSTATE: ICD-10-CM

## 2024-01-23 DIAGNOSIS — N20.1 LEFT URETERAL STONE: Primary | ICD-10-CM

## 2024-01-23 LAB — PSA SERPL-MCNC: 3.21 NG/ML (ref 0–4)

## 2024-01-23 PROCEDURE — 84153 ASSAY OF PSA TOTAL: CPT | Performed by: UROLOGY

## 2024-01-23 PROCEDURE — 74018 RADEX ABDOMEN 1 VIEW: CPT

## 2024-01-23 PROCEDURE — 74018 RADEX ABDOMEN 1 VIEW: CPT | Performed by: RADIOLOGY

## 2024-01-23 NOTE — PROGRESS NOTES
Chief Complaint:      Chief Complaint   Patient presents with    Nephrolithiasis     Yearly fu        HPI:   68 y.o. male returns today here for yearly follow-up of stone disease, no pain, KUB was checked showing bilateral upper middle lower pole small calyceal stones he is having no pain he has a PSA pending.  He reports no lower urinary tract symptomatology, particularly irritative symptoms such as frequency, urgency, dysuria, and obstructive symptomatology, particularly dribbling, hesitancy, and intermittency.  Will see him back in 1 year with KUB unless he has passage of a stone or a singular stone event  Past Medical History:     Past Medical History:   Diagnosis Date    Arthritis     Hypertension     Kidney stone     Sleep apnea        Current Meds:     Current Outpatient Medications   Medication Sig Dispense Refill    amLODIPine (NORVASC) 10 MG tablet       doxazosin (CARDURA) 2 MG tablet Take 1 tablet by mouth Daily.      multivitamin with minerals tablet tablet Take 1 tablet by mouth Daily.      Nutritional Supplements (Dread) powder Take 1 package by mouth 2 (Two) Times a Day. 545 g 5     No current facility-administered medications for this visit.        Allergies:      Allergies   Allergen Reactions    Sulfa Antibiotics Rash        Past Surgical History:     Past Surgical History:   Procedure Laterality Date    ABDOMINAL SURGERY      CYSTOLITHALOPAXY PERCUTANEOUS N/A 10/10/2022    Procedure: CYSTOLITHOLAPAXY WITH LASER;  Surgeon: Merrick Gutierrez MD;  Location: [facility];  Service: Urology;  Laterality: N/A;    CYSTOSCOPY, URETEROSCOPY, RETROGRADE PYELOGRAM, STENT INSERTION Left 10/10/2022    Procedure: URETEROSCOPY RETROGRADE PYELOGRAM HOLMIUM LASER STENT INSERTION;  Surgeon: Merrick Gutierrez MD;  Location: [facility];  Service: Urology;  Laterality: Left;    FRACTURE SURGERY      GALLBLADDER SURGERY      15 or more years ago    KIDNEY STONE SURGERY      LEG SURGERY Left 2002    has dori in  left leg       Social History:     Social History     Socioeconomic History    Marital status:    Tobacco Use    Smoking status: Never    Smokeless tobacco: Never   Vaping Use    Vaping Use: Never used   Substance and Sexual Activity    Alcohol use: Never    Drug use: Never    Sexual activity: Defer     Partners: Female       Family History:     Family History   Problem Relation Age of Onset    Hypertension Sister        Review of Systems:     Review of Systems   Constitutional: Negative.    HENT: Negative.     Eyes: Negative.    Respiratory: Negative.     Cardiovascular: Negative.    Gastrointestinal: Negative.    Endocrine: Negative.    Musculoskeletal: Negative.    Allergic/Immunologic: Negative.    Neurological: Negative.    Hematological: Negative.    Psychiatric/Behavioral: Negative.         Physical Exam:     Physical Exam  Vitals and nursing note reviewed.   Constitutional:       Appearance: He is well-developed.   HENT:      Head: Normocephalic and atraumatic.   Eyes:      Conjunctiva/sclera: Conjunctivae normal.      Pupils: Pupils are equal, round, and reactive to light.   Cardiovascular:      Rate and Rhythm: Normal rate and regular rhythm.      Heart sounds: Normal heart sounds.   Pulmonary:      Effort: Pulmonary effort is normal.      Breath sounds: Normal breath sounds.   Abdominal:      General: Bowel sounds are normal.      Palpations: Abdomen is soft.   Musculoskeletal:         General: Normal range of motion.      Cervical back: Normal range of motion.   Skin:     General: Skin is warm and dry.   Neurological:      Mental Status: He is alert and oriented to person, place, and time.      Deep Tendon Reflexes: Reflexes are normal and symmetric.   Psychiatric:         Behavior: Behavior normal.         Thought Content: Thought content normal.         Judgment: Judgment normal.         I have reviewed the following portions of the patient's history: Allergies, current medications, past family  history, past medical history, past social history, past surgical history, problem list, and ROS and confirm it is accurate.    Recent Image (CT and/or KUB):      CT Abdomen and Pelvis: Results for orders placed in visit on 01/21/14    CT ABDOMEN PELVIS WITH AND WITHOUT CONTRAST    Narrative  EXAM:  CT ABDOMEN WITHOUT CONTRAST AND CT PELVIS WITHOUT CONTRAST  CT ABDOMEN WITH CONTRAST AND CT PELVIS WITH CONTRAST    REASON: 58-year-old with hematuria    PROCEDURE:    Axial images were acquired from the lung bases through the  pubic symphysis initially without any IV contrast. Images were then  acquired from the lung bases through the pubic symphysis during IV  contrast and then on a delayed basis.    FINDINGS: The unenhanced study shows nonobstructing stones within both  kidneys.  There is, however, obstructive uropathy on the left. There is  hydronephrosis and hydroureter. Several stones are present in the left  ureter. The largest stone is just at the left UVJ. It measures 7.4 mm.  More proximal stone is 6.3 mm.    I do not see any right-sided ureteral stones.    The bladder wall is slightly thickened and the prostate gland is  enlarged.    The contrasted study shows no solid renal mass.    The liver and spleen are homogeneous with the exception of 2 low  attenuation lesions in the liver, probably hepatic cysts.    No free fluid or walled-off fluid collections are seen.    IMPRESSION-    Obstructive uropathy on the left due to several distal  left ureteral stones. The largest is at the left UVJ.    - ARELI Meza Radiologist- HILARIO CLEMENTS  Releasing Radiologist- HILARIO CLEMENTS  Released Date Time- 01/21/14 1126  ------------------------------------------------------------------------------       CT Stone Protocol: No results found for this or any previous visit.       KUB: Results for orders placed during the hospital encounter of 10/24/22    XR Abdomen KUB    Narrative  EXAM:  XR Abdomen,  1 View    EXAM DATE:  10/24/2022 2:59 PM    CLINICAL HISTORY:  stent'; N20.1-Calculus of ureter    TECHNIQUE:  Frontal supine view of the abdomen/pelvis.    COMPARISON:  No relevant prior studies available.    FINDINGS:  Gastrointestinal tract:  Unremarkable.  No dilation.  Organs:  Bilateral kidney stones are present.  No stones along the  left ureteral stent.  Bones/joints:  Degenerative changes lumbar spine.  Tubes, lines and devices:  Left-sided ureteral stent is noted.    Impression  1.  No radiographic evidence of stones along the left ureteral stent.  2.  Bilateral kidney stones are noted.    This report was finalized on 10/25/2022 8:46 AM by Dr. Asad Oswald MD.       Labs (past 3 months):      No visits with results within 3 Month(s) from this visit.   Latest known visit with results is:   Office Visit on 01/24/2023   Component Date Value Ref Range Status    Color 01/24/2023 Yellow  Yellow, Straw, Dark Yellow, Ramya Final    Clarity, UA 01/24/2023 Cloudy (A)  Clear Final    Specific Gravity  01/24/2023 1.025  1.005 - 1.030 Final    pH, Urine 01/24/2023 6.0  5.0 - 8.0 Final    Leukocytes 01/24/2023 Negative  Negative Final    Nitrite, UA 01/24/2023 Negative  Negative Final    Protein, POC 01/24/2023 Negative  Negative mg/dL Final    Glucose, UA 01/24/2023 Negative  Negative mg/dL Final    Ketones, UA 01/24/2023 Negative  Negative Final    Urobilinogen, UA 01/24/2023 Normal  Normal, 0.2 E.U./dL Final    Bilirubin 01/24/2023 Negative  Negative Final    Blood, UA 01/24/2023 1+ (A)  Negative Final    Lot Number 01/24/2023 [id number]   Final    Expiration Date 01/24/2023 [id number]   Final        Procedure:       Assessment/Plan:   Renal calculus-we discussed the presence of the stone. We discussed the various therapeutic options available including percutaneous nephrostolithotomy, ureteroscopy and extracorporeal shockwave  lithotripsy.  We discussed the risks of lithotripsy including the passage of stones, the  development of a large string of stones in the distal ureter known as Steinstrasse.  In the 3% incidence of that we will need to proceed with a ureteroscopy for obstructing fragments.  Extremely rare incidence of renal hematoma and the significance of this.  We discussed percutaneous nephrostolithotomy and its use as well as the risks and benefits such as the need for postoperative hospitalization, and the risk of damage to the kidney and the remote risk of a nephrectomy.  We also discussed the use of ureteroscopy in the upper tracts.  That this is as a decreased success rate to completely remove the stones on the first option and that very likely a stent will be required requiring an additional procedure for removal.  We discussed the absolute relative indicators for intervention including the presence of sepsis and pain we cannot control ais the primary need for urgent intervention.  We discussed placement of a stent if indicated and the management of the stent as well.  He has bilateral what appears to be a medullary sponge kidney is having no pain  PSA testing-I am recommending a PSA blood test that stands for prostate specific antigen.  I discussed the pathophysiology of PSA testing indicating its use in the diagnosis and management of prostate cancer.  I discussed the normal range being 0 to 4, but more appropriately being much closer to 0 to 2 in a normal male.  I discussed the fact that after a certain age we don't recommend PSA testing especially in view of numerous comorbidities, that this will not be a useful test.  I discussed many of the things that can artificially raise PSA including a recent infection, urinary tract infection, and recent sexual intercourse, or even the type of movement such as manipulation of the prostate from riding a bicycle.  After all this is taken into account when the test is reviewed, the most important use of PSA is the velocity measurement.  In other words, the change of PSA  with time is a very important factor in the use and that we look for greater than 20% rise over a year to help us make the prediction of prostate cancer.  I also discussed that the use with prostate cancer indicating that after a radical prostatectomy, the PSA should be 0 and any rise indicates an early biochemical recurrence.        This document has been electronically signed by HERNANDEZ BURRIS MD January 23, 2024 09:44 EST    Dictated Utilizing Dragon Dictation: Part of this note may be an electronic transcription/translation of spoken language to printed text using the Dragon Dictation System.

## 2025-01-08 ENCOUNTER — TRANSCRIBE ORDERS (OUTPATIENT)
Dept: ADMINISTRATIVE | Facility: HOSPITAL | Age: 70
End: 2025-01-08
Payer: COMMERCIAL

## 2025-01-08 DIAGNOSIS — Z09 POSTOPERATIVE FOLLOW-UP: Primary | ICD-10-CM

## 2025-01-09 ENCOUNTER — TRANSCRIBE ORDERS (OUTPATIENT)
Dept: ADMINISTRATIVE | Facility: HOSPITAL | Age: 70
End: 2025-01-09
Payer: COMMERCIAL

## 2025-01-09 DIAGNOSIS — Z13.6 ENCOUNTER FOR SCREENING FOR VASCULAR DISEASE: Primary | ICD-10-CM

## 2025-01-23 ENCOUNTER — TRANSCRIBE ORDERS (OUTPATIENT)
Dept: ADMINISTRATIVE | Facility: HOSPITAL | Age: 70
End: 2025-01-23
Payer: COMMERCIAL

## 2025-01-23 DIAGNOSIS — E78.5 HYPERLIPIDEMIA, UNSPECIFIED HYPERLIPIDEMIA TYPE: Primary | ICD-10-CM

## 2025-01-30 ENCOUNTER — TELEPHONE (OUTPATIENT)
Dept: UROLOGY | Facility: CLINIC | Age: 70
End: 2025-01-30

## 2025-01-30 NOTE — TELEPHONE ENCOUNTER
The [facility] received a fax that requires your attention. The document has been indexed to the patient’s chart for your review.      Reason for sending: FOR PROVIDER REVIEW    Documents Description: LABS 01/15/2025    Name of Sender: [facility]    Date Indexed: 01/15/2025    Notes (if needed):

## 2025-02-03 DIAGNOSIS — N20.1 LEFT URETERAL STONE: Primary | ICD-10-CM

## 2025-02-04 ENCOUNTER — OFFICE VISIT (OUTPATIENT)
Dept: UROLOGY | Facility: CLINIC | Age: 70
End: 2025-02-04
Payer: COMMERCIAL

## 2025-02-04 ENCOUNTER — HOSPITAL ENCOUNTER (OUTPATIENT)
Dept: GENERAL RADIOLOGY | Facility: HOSPITAL | Age: 70
Discharge: HOME | End: 2025-02-04
Admitting: UROLOGY
Payer: COMMERCIAL

## 2025-02-04 VITALS
SYSTOLIC BLOOD PRESSURE: 166 MMHG | HEART RATE: 92 BPM | WEIGHT: 183.6 LBS | BODY MASS INDEX: 27.83 KG/M2 | DIASTOLIC BLOOD PRESSURE: 104 MMHG | HEIGHT: 68 IN

## 2025-02-04 DIAGNOSIS — N20.0 BILATERAL NEPHROLITHIASIS: ICD-10-CM

## 2025-02-04 DIAGNOSIS — R97.20 ELEVATED PROSTATE SPECIFIC ANTIGEN (PSA): Primary | ICD-10-CM

## 2025-02-04 PROCEDURE — 99213 OFFICE O/P EST LOW 20 MIN: CPT | Performed by: UROLOGY

## 2025-02-04 PROCEDURE — 74018 RADEX ABDOMEN 1 VIEW: CPT

## 2025-02-04 PROCEDURE — 74018 RADEX ABDOMEN 1 VIEW: CPT | Performed by: RADIOLOGY

## 2025-02-04 NOTE — PROGRESS NOTES
Chief Complaint:      Chief Complaint   Patient presents with    Left ureteral stone       HPI:   69 y.o. malereturns today here for yearly follow-up of stone disease, no pain, KUB was checked showing bilateral upper middle lower pole small calyceal stones he is having no pain he has a PSA pending. He reports no lower urinary tract symptomatology, particularly irritative symptoms such as frequency, urgency, dysuria, and obstructive symptomatology, particularly dribbling, hesitancy, and intermittency. Will see him back in 1 year with KUB unless he has passage of a stone or a singular stone event.  He returns today he had a left leg deep venous thrombosis on Eliquis.  He had an RFA to the left leg he has no change in his KUB previous PSA was 5.05 on 1-28-25  up  from 3.2 but he sees Dr. Fox.  He has greater than 80 g prostate he is concerned I am when to get an MRI per his request we will await the results.    Past Medical History:     Past Medical History:   Diagnosis Date    Arthritis     Hypertension     Kidney stone     Sleep apnea        Current Meds:     Current Outpatient Medications   Medication Sig Dispense Refill    amLODIPine (NORVASC) 10 MG tablet       doxazosin (CARDURA) 2 MG tablet Take 1 tablet by mouth Daily.      multivitamin with minerals tablet tablet Take 1 tablet by mouth Daily.      Nutritional Supplements (Dread) powder Take 1 package by mouth 2 (Two) Times a Day. 545 g 5     No current facility-administered medications for this visit.        Allergies:      Allergies   Allergen Reactions    Sulfa Antibiotics Rash        Past Surgical History:     Past Surgical History:   Procedure Laterality Date    ABDOMINAL SURGERY      CYSTOLITHALOPAXY PERCUTANEOUS N/A 10/10/2022    Procedure: CYSTOLITHOLAPAXY WITH LASER;  Surgeon: Merrick Gutierrez MD;  Location: [facility];  Service: Urology;  Laterality: N/A;    CYSTOSCOPY, URETEROSCOPY, RETROGRADE PYELOGRAM, STENT INSERTION Left 10/10/2022     Procedure: URETEROSCOPY RETROGRADE PYELOGRAM HOLMIUM LASER STENT INSERTION;  Surgeon: Merrick Gutierrez MD;  Location: [facility];  Service: Urology;  Laterality: Left;    FRACTURE SURGERY      GALLBLADDER SURGERY      15 or more years ago    KIDNEY STONE SURGERY      LEG SURGERY Left 2002    has dori in left leg       Social History:     Social History     Socioeconomic History    Marital status:    Tobacco Use    Smoking status: Never    Smokeless tobacco: Never   Vaping Use    Vaping status: Never Used   Substance and Sexual Activity    Alcohol use: Never    Drug use: Never    Sexual activity: Defer     Partners: Female       Family History:     Family History   Problem Relation Age of Onset    Hypertension Sister        Review of Systems:     Review of Systems   Constitutional: Negative.    HENT: Negative.     Eyes: Negative.    Respiratory: Negative.     Cardiovascular: Negative.    Gastrointestinal: Negative.    Endocrine: Negative.    Musculoskeletal: Negative.    Allergic/Immunologic: Negative.    Neurological: Negative.    Hematological: Negative.    Psychiatric/Behavioral: Negative.         Physical Exam:     Physical Exam  Vitals and nursing note reviewed.   Constitutional:       Appearance: He is well-developed.   HENT:      Head: Normocephalic and atraumatic.   Eyes:      Conjunctiva/sclera: Conjunctivae normal.      Pupils: Pupils are equal, round, and reactive to light.   Cardiovascular:      Rate and Rhythm: Normal rate and regular rhythm.      Heart sounds: Normal heart sounds.   Pulmonary:      Effort: Pulmonary effort is normal.      Breath sounds: Normal breath sounds.   Abdominal:      General: Bowel sounds are normal.      Palpations: Abdomen is soft.   Genitourinary:     Comments: Greater than 80 g prostate to palpation no obvious nodularity  Musculoskeletal:         General: Normal range of motion.      Cervical back: Normal range of motion.   Skin:     General: Skin is warm and  dry.   Neurological:      Mental Status: He is alert and oriented to person, place, and time.      Deep Tendon Reflexes: Reflexes are normal and symmetric.   Psychiatric:         Behavior: Behavior normal.         Thought Content: Thought content normal.         Judgment: Judgment normal.         I have reviewed the following portions of the patient's history: Allergies, current medications, past family history, past medical history, past social history, past surgical history, problem list, and ROS and confirm it is accurate.    Recent Image (CT and/or KUB):      CT Abdomen and Pelvis: Results for orders placed in visit on 01/21/14    CT ABDOMEN PELVIS WITH AND WITHOUT CONTRAST    Narrative  EXAM:  CT ABDOMEN WITHOUT CONTRAST AND CT PELVIS WITHOUT CONTRAST  CT ABDOMEN WITH CONTRAST AND CT PELVIS WITH CONTRAST    REASON: 58-year-old with hematuria    PROCEDURE:    Axial images were acquired from the lung bases through the  pubic symphysis initially without any IV contrast. Images were then  acquired from the lung bases through the pubic symphysis during IV  contrast and then on a delayed basis.    FINDINGS: The unenhanced study shows nonobstructing stones within both  kidneys.  There is, however, obstructive uropathy on the left. There is  hydronephrosis and hydroureter. Several stones are present in the left  ureter. The largest stone is just at the left UVJ. It measures 7.4 mm.  More proximal stone is 6.3 mm.    I do not see any right-sided ureteral stones.    The bladder wall is slightly thickened and the prostate gland is  enlarged.    The contrasted study shows no solid renal mass.    The liver and spleen are homogeneous with the exception of 2 low  attenuation lesions in the liver, probably hepatic cysts.    No free fluid or walled-off fluid collections are seen.    IMPRESSION-    Obstructive uropathy on the left due to several distal  left ureteral stones. The largest is at the left UVJ.    -  ARELI Meza Radiologist- HILARIO CLEMENTS  Releasing Radiologist- HILARIO KYRA TYREE  Released Date Time- 01/21/14 1126  ------------------------------------------------------------------------------       CT Stone Protocol: No results found for this or any previous visit.       KUB: Results for orders placed during the hospital encounter of 01/23/24    XR Abdomen KUB    Narrative  EXAM:  XR Abdomen, 1 View    EXAM DATE:  1/23/2024 10:04 AM    CLINICAL HISTORY:  stone; N20.1-Calculus of ureter    TECHNIQUE:  Frontal supine view of the abdomen/pelvis.    COMPARISON:  10/24/2022    FINDINGS:  GASTROINTESTINAL TRACT:  Unremarkable as visualized.  No dilation.  ORGANS:  Again noted are bilateral renal calculi.  BONES/JOINTS:  Unremarkable as visualized.  TUBES, LINES AND DEVICES:  Previously noted left double-J ureteral  stent has been removed.    Impression  1.  Again noted are bilateral renal calculi.  2.  Previously noted left double-J ureteral stent has been removed.      This report was finalized on 1/23/2024 10:41 AM by Dr. Miles Richey MD.       Labs (past 3 months):      No visits with results within 3 Month(s) from this visit.   Latest known visit with results is:   Office Visit on 01/23/2024   Component Date Value Ref Range Status    PSA 01/23/2024 3.210  0.000 - 4.000 ng/mL Final        Procedure:       Assessment/Plan:   Elevated prostate specific antigen-we discussed the diagnosis of elevated prostate-specific antigen.  I explained the pathophysiology of PSA.  It is a serine protease that's function in the male reproductive tract is to facilitate the liquefaction of semen.  It is for this reason the body does not want it freely floating in the serum and why typically bound tightly to albumin.  We discussed why we used both a PSA free and total to determine the need for more aggressive therapy. I discussed the normal range.  Additionally, it was in the range of 1 to 4, but more recently has been  downgraded to something less than 2 or even approaching 1.  I discussed the risk of family history, particularly the fact that the average male has a 14% risk of prostate cancer and that in the face of a positive diagnosis in a father it will tablet and any other first-generation relative continued tablet insofar that a father and brother with prostate cancer will produce almost a 50% risk of prostate cancer.  I discussed the use of the temporal use of PSA as the best option for monitoring.  We also discussed the fact that an elevated PSA is an isolated event does not mean that this is prostate cancer and should not engender worry in this regard. I discussed other things that can elevate PSA including constipation, prostatitis, infection, recent intercourse etc., as well as the risks and benefits associated with this.  Also discussed the fact that this is with a dilutional test and as a consequence of such were present produce slight variations on a single specimen.  Further discussed the risks and benefits of a prostate biopsy as well.  PSA took a doubling I will get an MRI to confirm  BPH: Discussed the pathophysiology of BPH and obstruction.  We discussed the static and dynamic effects of BPH as well as using 5 alpha reductase inhibitors versus alpha blockade.  We discussed the indications for transurethral surgery as well and/ or other therapeutic options available including all of the newer techniques.  Due to the generous size prostate certainly could explain the elevation of the PSA.  Nonetheless we do have a prostate MRI pending          This document has been electronically signed by HERNANDEZ BURRIS MD February 4, 2025 09:53 EST    Dictated Utilizing Dragon Dictation: Part of this note may be an electronic transcription/translation of spoken language to printed text using the Dragon Dictation System.

## 2025-02-11 PROBLEM — R97.20 ELEVATED PROSTATE SPECIFIC ANTIGEN (PSA): Status: ACTIVE | Noted: 2025-02-11

## 2025-02-14 ENCOUNTER — TELEPHONE (OUTPATIENT)
Dept: UROLOGY | Facility: CLINIC | Age: 70
End: 2025-02-14
Payer: COMMERCIAL

## 2025-02-14 NOTE — TELEPHONE ENCOUNTER
Called patient to schedule him a follow up to go over the results of his MRI once completed. Got him on for 3/4/25 @ 9:20 with Dr. Gutierrez. Patient was agreeable to that date and time.

## 2025-02-22 ENCOUNTER — HOSPITAL ENCOUNTER (OUTPATIENT)
Dept: CT IMAGING | Facility: HOSPITAL | Age: 70
Discharge: HOME | End: 2025-02-22

## 2025-02-22 DIAGNOSIS — E78.5 HYPERLIPIDEMIA, UNSPECIFIED HYPERLIPIDEMIA TYPE: ICD-10-CM

## 2025-02-22 PROCEDURE — 75571 CT HRT W/O DYE W/CA TEST: CPT

## 2025-02-26 ENCOUNTER — TELEPHONE (OUTPATIENT)
Dept: UROLOGY | Facility: CLINIC | Age: 70
End: 2025-02-26
Payer: COMMERCIAL

## 2025-02-26 NOTE — TELEPHONE ENCOUNTER
Caller: Broderick Pereira    Relationship: Self    Best call back number: [phone] (home)      What is the best time to reach you: ANYTIME TODAY, OK TO LVM      What was the call regarding: PATIENT HAS MRI OF THE PROSTATE SCHEDULED THIS FRIDAY PER DR BURRIS.  PT STATED THAT HE HAS A CARDIOLOGIST APPT ON MONDAY DUE TO POSSIBLE BLOCKAGE.  HE IS WANTING TO KNOW IF HE STILL NEEDS TO HAVE THE MRI AT THIS TIME OR IF IT CAN BE SCHEDULED FOR LATER IN THE MONTH AFTER HE KNOWS WHAT IS GOING TO OCCUR WITH THE CARDIOLOGIST APPT.  PLEASE CALL PT TO ADVISE.      Is it okay if the provider responds through MyChart: YES

## 2025-02-28 ENCOUNTER — HOSPITAL ENCOUNTER (OUTPATIENT)
Dept: MRI IMAGING | Facility: HOSPITAL | Age: 70
Discharge: HOME | End: 2025-02-28
Payer: COMMERCIAL

## 2025-02-28 DIAGNOSIS — R97.20 ELEVATED PROSTATE SPECIFIC ANTIGEN (PSA): ICD-10-CM

## 2025-03-03 ENCOUNTER — TELEPHONE (OUTPATIENT)
Dept: UROLOGY | Facility: CLINIC | Age: 70
End: 2025-03-03
Payer: COMMERCIAL

## 2025-03-03 ENCOUNTER — OFFICE VISIT (OUTPATIENT)
Dept: CARDIOLOGY | Facility: CLINIC | Age: 70
End: 2025-03-03
Payer: COMMERCIAL

## 2025-03-03 VITALS
WEIGHT: 181.4 LBS | HEART RATE: 71 BPM | BODY MASS INDEX: 24.57 KG/M2 | OXYGEN SATURATION: 95 % | DIASTOLIC BLOOD PRESSURE: 64 MMHG | SYSTOLIC BLOOD PRESSURE: 138 MMHG | HEIGHT: 72 IN

## 2025-03-03 DIAGNOSIS — R93.1 ELEVATED CORONARY ARTERY CALCIUM SCORE: Primary | ICD-10-CM

## 2025-03-03 PROCEDURE — 99204 OFFICE O/P NEW MOD 45 MIN: CPT | Performed by: INTERNAL MEDICINE

## 2025-03-03 PROCEDURE — 93000 ELECTROCARDIOGRAM COMPLETE: CPT | Performed by: INTERNAL MEDICINE

## 2025-03-03 RX ORDER — ROSUVASTATIN CALCIUM 5 MG/1
5 TABLET, COATED ORAL EVERY EVENING
COMMUNITY
Start: 2025-02-24

## 2025-03-03 RX ORDER — APIXABAN 5 MG/1
5 TABLET, FILM COATED ORAL EVERY 12 HOURS SCHEDULED
COMMUNITY
Start: 2025-01-15

## 2025-03-03 RX ORDER — ASPIRIN 81 MG/1
81 TABLET ORAL DAILY
COMMUNITY

## 2025-03-03 RX ORDER — TERBINAFINE HYDROCHLORIDE 250 MG/1
TABLET ORAL
COMMUNITY
Start: 2025-01-30

## 2025-03-03 RX ORDER — CARVEDILOL 3.12 MG/1
3.12 TABLET ORAL DAILY
COMMUNITY
Start: 2025-02-24

## 2025-03-03 NOTE — TELEPHONE ENCOUNTER
Patient can not have an mri due to metal in his leg.  He would like to know if it is ok if just has a psa done in a couple of months.  Then if needed he would be willing to do a bx.

## 2025-03-03 NOTE — PROGRESS NOTES
Subjective:     Encounter Date:03/03/2025    Primary Care Physician: Allyson Fxo APRN      Patient ID: Broderick Pereira is a 69 y.o. male.    Chief Complaint:Abn CT of heart and Hypertension    PROBLEM LIST:  Elevated coronary calcium score  2/2025 calcium score 834 (left main 0, , circumflex 0, RCA 37)  Hypertension  GERD  Sleep apnea?  History of nephrolithiasis  Arthritis  BPH  Stasis dermatitis   History of DVT, 12/2024  Had previous stasis ulcer.   Surgeries:  Left leg venous surgery  Left leg fracture with surgery  Surgery for nephrolithiasis  Cholecystectomy      Allergies   Allergen Reactions    Sulfa Antibiotics Rash         Current Outpatient Medications:     amLODIPine (NORVASC) 10 MG tablet, Take 1 tablet by mouth Daily., Disp: , Rfl:     aspirin 81 MG EC tablet, Take 1 tablet by mouth Daily., Disp: , Rfl:     carvedilol (COREG) 3.125 MG tablet, Take 1 tablet by mouth Daily. for blood pressure, Disp: , Rfl:     doxazosin (CARDURA) 2 MG tablet, Take 1 tablet by mouth Daily., Disp: , Rfl:     Eliquis 5 MG tablet tablet, Take 1 tablet by mouth Every 12 (Twelve) Hours., Disp: , Rfl:     multivitamin with minerals tablet tablet, Take 1 tablet by mouth Daily., Disp: , Rfl:     rosuvastatin (CRESTOR) 5 MG tablet, Take 1 tablet by mouth Every Evening., Disp: , Rfl:     terbinafine (lamiSIL) 250 MG tablet, TAKE 1 TABLET BY MOUTH 3 TIMES WEEKLY FOR TOENAIL INFECTION, Disp: , Rfl:         History of Present Illness    Patient is a 69-year-old  male who presents today for further evaluation of elevated coronary calcium score.  Patient notes that he went in for routine wellness visit and at that time they did noted some previous coronary calcifications on an abdominal CT scan.  Given his advancing age it was felt further evaluation with calcium coronary score would be beneficial.  This was abnormal with results as noted above and he was referred here for further evaluation.  He denies any chest  "pain, pressure, tightness.  Denies any increasing shortness of breath, however his wife notes that at times he seems to be more short of breath with stairs.  No reported syncope, presyncope, or worsened edema.  He is currently on Eliquis secondary to lower extremity DVT post venous surgery.    The following portions of the patient's history were reviewed and updated as appropriate: allergies, current medications, past family history, past medical history, past social history, past surgical history and problem list.    Family History   Problem Relation Age of Onset    Hypertension Sister     Migraines Sister     Hypertension Brother     Hypertension Brother        Social History     Tobacco Use    Smoking status: Never    Smokeless tobacco: Never   Vaping Use    Vaping status: Never Used   Substance Use Topics    Alcohol use: Never    Drug use: Never         Review of Systems   Constitutional: Negative for fever and malaise/fatigue.   HENT:  Negative for nosebleeds.    Eyes:  Negative for redness and visual disturbance.   Cardiovascular:  Positive for leg swelling. Negative for orthopnea, palpitations and paroxysmal nocturnal dyspnea.   Respiratory:  Negative for cough, snoring, sputum production and wheezing.    Hematologic/Lymphatic: Negative for bleeding problem. Bruises/bleeds easily.   Skin:  Positive for rash. Negative for flushing and itching.   Musculoskeletal:  Negative for falls, joint pain and muscle cramps.   Gastrointestinal:  Negative for abdominal pain, diarrhea, heartburn, nausea and vomiting.   Genitourinary:  Negative for hematuria.   Neurological:  Negative for excessive daytime sleepiness, dizziness, headaches, tremors and weakness.   Psychiatric/Behavioral:  Negative for substance abuse. The patient is not nervous/anxious.           Objective:   /64 (BP Location: Right arm, Patient Position: Sitting)   Pulse 71   Ht 182.9 cm (72\")   Wt 82.3 kg (181 lb 6.4 oz)   SpO2 95%   BMI 24.60 " "kg/m²         Vitals reviewed.   Constitutional:       Appearance: Healthy appearance. Well-developed and not in distress.   Eyes:      Conjunctiva/sclera: Conjunctivae normal.      Pupils: Pupils are equal, round, and reactive to light.   HENT:      Head: Normocephalic and atraumatic.    Mouth/Throat:      Pharynx: Oropharynx is clear.   Neck:      Thyroid: Thyroid normal. No thyromegaly.      Vascular: Normal carotid pulses. No carotid bruit or JVD. JVD normal.      Lymphadenopathy: No cervical adenopathy.   Pulmonary:      Effort: No respiratory distress.      Breath sounds: No wheezing. No rales.   Chest:      Chest wall: Not tender to palpatation.   Cardiovascular:      Normal rate. Regular rhythm.      No gallop.    Pulses:     Carotid: 2+ bilaterally.     Dorsalis pedis: 2+ bilaterally.     Posterior tibial: 2+ bilaterally.  Edema:     Peripheral edema absent.   Abdominal:      General: There is no distension or abdominal bruit.      Palpations: There is no abdominal mass.      Tenderness: There is no abdominal tenderness. There is no rebound.   Musculoskeletal:         General: No tenderness or deformity.      Extremities: No clubbing present.Skin:     General: Skin is warm and dry. There is no cyanosis.      Findings: No rash.   Neurological:      Mental Status: Alert, oriented to person, place, and time and oriented to person, place and time.           ECG 12 Lead    Date/Time: 3/3/2025 2:05 PM  Performed by: Kip Simon MD    Authorized by: Kip Simon MD  Comparison: compared with previous ECG from 10/7/2022  Comparison to previous ECG: No incomplete right bundle  Rhythm: sinus rhythm  Ectopy: atrial premature contractions                Assessment:   Assessment & Plan      Diagnoses and all orders for this visit:    1. Elevated coronary artery calcium score (Primary)  -     ECG 12 Lead      1.  Coronary artery disease.  Calcium score greater than 700.  2.  Exertional \"acid reflux\" after " eating.  Somewhat concerning for angina.  3.  Dyslipidemia, recently started on low-dose rosuvastatin  4.  Hypertension controlled on carvedilol  5.  History of recent DVT post venous ablative procedure due to venous stasis ulcer.  Still on Eliquis twice daily    Recommendations:  1.  Will hold aspirin until patient is off Eliquis.  2.  Continue current medical therapy  3.  Check myocardial perfusion study to rule out ischemia as the cause of his postprandial symptoms.  4.  Further recommendations pending the above.  5.  Recheck fasting lipid profile once he has been on statin for 3 months, (through primary care physician's office).  Goal LDL less than 70.  6.  Revisit annually apparent symptom check       Dena AREVALO scribed portions of this dictation for Dr. Kip Simon.     Advance Care Planning   ACP discussion was held with the patient during this visit. Patient does not have an advance directive, declines further assistance.      I have seen and examined the patient, I have reviewed the note, discussed the case with the advance practice clinician, made necessary changes and I agree with the final note.    Kip Simon MD  03/03/25  16:47 EST      Dictated utilizing Dragon dictation

## 2025-03-03 NOTE — TELEPHONE ENCOUNTER
Routing to Doc to see if this was ok. He said yes that was fine. I rescheduled his appt for 6 weeks out to recheck him then and go from there and made Lilly aware due to scheduling for the MRI.

## 2025-03-07 ENCOUNTER — HOSPITAL ENCOUNTER (OUTPATIENT)
Dept: CARDIOLOGY | Facility: HOSPITAL | Age: 70
Discharge: HOME | End: 2025-03-07
Admitting: INTERNAL MEDICINE
Payer: COMMERCIAL

## 2025-03-07 VITALS — WEIGHT: 181.44 LBS | BODY MASS INDEX: 24.58 KG/M2 | HEIGHT: 72 IN

## 2025-03-07 DIAGNOSIS — R93.1 ELEVATED CORONARY ARTERY CALCIUM SCORE: ICD-10-CM

## 2025-03-07 LAB
BH CV REST NUCLEAR ISOTOPE DOSE: 9.5 MCI
BH CV STRESS BP STAGE 2: NORMAL
BH CV STRESS BP STAGE 3: NORMAL
BH CV STRESS BP STAGE 4: NORMAL
BH CV STRESS COMMENTS STAGE 1: NORMAL
BH CV STRESS COMMENTS STAGE 2: NORMAL
BH CV STRESS DOSE REGADENOSON STAGE 1: 0.4
BH CV STRESS DURATION MIN STAGE 1: 1
BH CV STRESS DURATION MIN STAGE 2: 1
BH CV STRESS DURATION MIN STAGE 3: 1
BH CV STRESS DURATION MIN STAGE 4: 1
BH CV STRESS DURATION SEC STAGE 1: 0
BH CV STRESS DURATION SEC STAGE 2: 0
BH CV STRESS DURATION SEC STAGE 3: 0
BH CV STRESS DURATION SEC STAGE 4: 0
BH CV STRESS HR STAGE 1: 103
BH CV STRESS HR STAGE 2: 102
BH CV STRESS HR STAGE 3: 97
BH CV STRESS HR STAGE 4: 93
BH CV STRESS NUCLEAR ISOTOPE DOSE: 31.3 MCI
BH CV STRESS O2 STAGE 1: 95
BH CV STRESS O2 STAGE 2: 95
BH CV STRESS O2 STAGE 3: 97
BH CV STRESS O2 STAGE 4: 96
BH CV STRESS PROTOCOL 1: NORMAL
BH CV STRESS RECOVERY BP: NORMAL MMHG
BH CV STRESS RECOVERY HR: 82 BPM
BH CV STRESS RECOVERY O2: 95 %
BH CV STRESS STAGE 1: 1
BH CV STRESS STAGE 2: 2
BH CV STRESS STAGE 3: 3
BH CV STRESS STAGE 4: 4
MAXIMAL PREDICTED HEART RATE: 153 BPM
PERCENT MAX PREDICTED HR: 69.93 %
SPECT HRT GATED+EF W RNC IV: 71 %
STRESS BASELINE BP: NORMAL MMHG
STRESS BASELINE HR: 72 BPM
STRESS O2 SAT REST: 96 %
STRESS PERCENT HR: 82 %
STRESS POST ESTIMATED WORKLOAD: 1 METS
STRESS POST EXERCISE DUR MIN: 4 MIN
STRESS POST EXERCISE DUR SEC: 0 SEC
STRESS POST O2 SAT PEAK: 97 %
STRESS POST PEAK BP: NORMAL MMHG
STRESS POST PEAK HR: 107 BPM
STRESS TARGET HR: 130 BPM

## 2025-03-07 PROCEDURE — 78452 HT MUSCLE IMAGE SPECT MULT: CPT

## 2025-03-07 PROCEDURE — 93017 CV STRESS TEST TRACING ONLY: CPT

## 2025-03-07 PROCEDURE — 25010000002 REGADENOSON 0.4 MG/5ML SOLUTION: Performed by: INTERNAL MEDICINE

## 2025-03-07 PROCEDURE — 34310000005 TECHNETIUM SESTAMIBI: Performed by: INTERNAL MEDICINE

## 2025-03-07 PROCEDURE — A9500 TC99M SESTAMIBI: HCPCS | Performed by: INTERNAL MEDICINE

## 2025-03-07 RX ORDER — CAFFEINE CITRATE 20 MG/ML
60 SOLUTION INTRAVENOUS ONCE
Status: COMPLETED | OUTPATIENT
Start: 2025-03-07 | End: 2025-03-07

## 2025-03-07 RX ORDER — REGADENOSON 0.08 MG/ML
0.4 INJECTION, SOLUTION INTRAVENOUS ONCE
Status: COMPLETED | OUTPATIENT
Start: 2025-03-07 | End: 2025-03-07

## 2025-03-07 RX ADMIN — TECHNETIUM TC 99M SESTAMIBI 1 DOSE: 1 INJECTION INTRAVENOUS at 10:28

## 2025-03-07 RX ADMIN — TECHNETIUM TC 99M SESTAMIBI 1 DOSE: 1 INJECTION INTRAVENOUS at 08:40

## 2025-03-07 RX ADMIN — REGADENOSON 0.4 MG: 0.08 INJECTION, SOLUTION INTRAVENOUS at 09:55

## 2025-03-07 RX ADMIN — CAFFEINE CITRATE 60 MG: 20 INJECTION, SOLUTION INTRAVENOUS at 10:03

## (undated) DEVICE — GW ZIPWIRE STD/SHFT STR TPR/3CM .035IN 150CM

## (undated) DEVICE — GLV SURG PREMIERPRO MIC LTX PF SZ8 BRN

## (undated) DEVICE — FIBR LASR FLEXIVAPULSE365 HOLMIUM FLT/TP 1P/U

## (undated) DEVICE — DRAINBAG,ANTI-REFLUX TOWER,L/F,2000ML,LL: Brand: MEDLINE

## (undated) DEVICE — NITINOL STONE RETRIEVAL BASKET: Brand: ZERO TIP

## (undated) DEVICE — FIBR LASR FLEXIVA 1000 HIPOWR 1P/U

## (undated) DEVICE — COR CYSTO: Brand: MEDLINE INDUSTRIES, INC.

## (undated) DEVICE — EVAC BLDR ELLIK 1P/U

## (undated) DEVICE — CATHETER,FOLEY,SILI-ELAST,LTX,16FR,10ML: Brand: MEDLINE